# Patient Record
Sex: FEMALE | Race: WHITE | NOT HISPANIC OR LATINO | Employment: FULL TIME | URBAN - METROPOLITAN AREA
[De-identification: names, ages, dates, MRNs, and addresses within clinical notes are randomized per-mention and may not be internally consistent; named-entity substitution may affect disease eponyms.]

---

## 2021-06-30 ENCOUNTER — OFFICE VISIT (OUTPATIENT)
Dept: URGENT CARE | Facility: CLINIC | Age: 51
End: 2021-06-30
Payer: COMMERCIAL

## 2021-06-30 ENCOUNTER — APPOINTMENT (OUTPATIENT)
Dept: RADIOLOGY | Facility: CLINIC | Age: 51
End: 2021-06-30
Payer: COMMERCIAL

## 2021-06-30 VITALS
OXYGEN SATURATION: 97 % | SYSTOLIC BLOOD PRESSURE: 170 MMHG | BODY MASS INDEX: 27.6 KG/M2 | WEIGHT: 150 LBS | HEART RATE: 88 BPM | RESPIRATION RATE: 20 BRPM | DIASTOLIC BLOOD PRESSURE: 100 MMHG | HEIGHT: 62 IN | TEMPERATURE: 98 F

## 2021-06-30 DIAGNOSIS — S80.212A ABRASION, LEFT KNEE, INITIAL ENCOUNTER: ICD-10-CM

## 2021-06-30 DIAGNOSIS — T23.221A PARTIAL THICKNESS BURN OF RIGHT MIDDLE FINGER: ICD-10-CM

## 2021-06-30 DIAGNOSIS — R07.81 RIB PAIN ON LEFT SIDE: Primary | ICD-10-CM

## 2021-06-30 DIAGNOSIS — R07.81 RIB PAIN ON LEFT SIDE: ICD-10-CM

## 2021-06-30 PROCEDURE — 71101 X-RAY EXAM UNILAT RIBS/CHEST: CPT

## 2021-06-30 PROCEDURE — 99203 OFFICE O/P NEW LOW 30 MIN: CPT | Performed by: EMERGENCY MEDICINE

## 2021-06-30 RX ORDER — AMLODIPINE BESYLATE 5 MG/1
5 TABLET ORAL DAILY
COMMUNITY
Start: 2021-05-13

## 2021-06-30 RX ORDER — SULFAMETHOXAZOLE AND TRIMETHOPRIM 800; 160 MG/1; MG/1
1 TABLET ORAL EVERY 12 HOURS SCHEDULED
Qty: 14 TABLET | Refills: 0 | Status: SHIPPED | OUTPATIENT
Start: 2021-06-30 | End: 2021-07-07

## 2021-06-30 RX ORDER — CYCLOBENZAPRINE HCL 10 MG
10 TABLET ORAL 3 TIMES DAILY PRN
Qty: 20 TABLET | Refills: 0 | Status: SHIPPED | OUTPATIENT
Start: 2021-06-30

## 2021-06-30 NOTE — PROGRESS NOTES
[facility]        NAME: Damion Travis is a 46 y o  female  : 1970    MRN: [id number]  DATE: 2021  TIME: 9:48 AM    Assessment and Plan   Rib pain on left side [R07 81]  1  Rib pain on left side     Radiology report questionable fracture nondisplaced on single view only left 6th rib  I discussed this possibility with patient during her visit and called and left a message with this information  Patient Instructions     There are no Patient Instructions on file for this visit  Follow up with PCP in 3-5 days  Proceed to  ER if symptoms worsen  Chief Complaint     Chief Complaint   Patient presents with    Rib Injury     was knocked over by her horse 3 days ago and has severe pain in left lower anterior ribs  Has trouble breathing and is coughing alot         History of Present Illness       Patient complains of pain left ribs and abrasion of left knee since knocked over by her horse 3 days ago  She also complains of burn to her right middle finger in an unrelated event yesterday  She denies shortness of breath  She denies hemoptysis  She believes she is up-to-date on tetanus      Review of Systems   Review of Systems   Constitutional: Negative for activity change  Respiratory: Negative for cough and shortness of breath  Cardiovascular: Positive for chest pain  Negative for palpitations  Gastrointestinal: Negative for abdominal pain  Musculoskeletal: Negative for arthralgias, back pain, joint swelling, myalgias, neck pain and neck stiffness  Skin: Positive for wound  Negative for color change  Neurological: Negative for dizziness, syncope, weakness, light-headedness and headaches           Current Medications       Current Outpatient Medications:     amLODIPine (NORVASC) 5 mg tablet, Take 5 mg by mouth daily, Disp: , Rfl:     Current Allergies     Allergies as of 2021    (No Known Allergies)            The following portions of the patient's history were reviewed and updated as appropriate: allergies, current medications, past family history, past medical history, past social history, past surgical history and problem list      History reviewed  No pertinent past medical history  No past surgical history on file  No family history on file  Medications have been verified  Objective   /100   Pulse 88   Temp 98 °F (36 7 °C)   Resp 20   Ht 5' 2" (1 575 m)   Wt 68 kg (150 lb)   SpO2 97%   BMI 27 44 kg/m²        Physical Exam     Physical Exam  Vitals and nursing note reviewed  Constitutional:       Appearance: She is well-developed  HENT:      Head: Normocephalic and atraumatic  Eyes:      Conjunctiva/sclera: Conjunctivae normal       Pupils: Pupils are equal, round, and reactive to light  Cardiovascular:      Rate and Rhythm: Normal rate and regular rhythm  Heart sounds: Normal heart sounds  No murmur heard  No friction rub  No gallop  Pulmonary:      Effort: Pulmonary effort is normal       Breath sounds: Normal breath sounds  Musculoskeletal:         General: No tenderness  Cervical back: Normal range of motion and neck supple  Skin:     General: Skin is warm and dry  Findings: No rash  Comments: Superficial abrasion left knee, second-degree burn right middle finger palmar aspect   Neurological:      Mental Status: She is alert and oriented to person, place, and time

## 2021-06-30 NOTE — PATIENT INSTRUCTIONS
Rib Contusion   WHAT YOU NEED TO KNOW:   A rib contusion is a bruise on one or more of your ribs  DISCHARGE INSTRUCTIONS:   Return to the emergency department if:   · You have increased chest pain  · You have shortness of breath  · You start to cough up blood  · Your pain does not improve with pain medicine  Contact your healthcare provider if:   · You have a cough  · You have a fever  · You have questions or concerns about your condition or care  Medicines: You may need any of the following:  · NSAIDs , such as ibuprofen, help decrease swelling, pain, and fever  This medicine is available with or without a doctor's order  NSAIDs can cause stomach bleeding or kidney problems in certain people  If you take blood thinner medicine, always ask if NSAIDs are safe for you  Always read the medicine label and follow directions  Do not give these medicines to children under 10months of age without direction from your child's healthcare provider  · Prescription pain medicine  may be given  Ask how to take this medicine safely  · Take your medicine as directed  Contact your healthcare provider if you think your medicine is not helping or if you have side effects  Tell him of her if you are allergic to any medicine  Keep a list of the medicines, vitamins, and herbs you take  Include the amounts, and when and why you take them  Bring the list or the pill bottles to follow-up visits  Carry your medicine list with you in case of an emergency  Deep breathing:   · To help prevent pneumonia, take 10 deep breaths every hour, even when you wake up during the night  Brace your ribs with your hands or a pillow while you take deep breaths or cough  This will help decrease your pain  · You may need to use an incentive spirometer to help you take deeper breaths  Put the plastic piece into your mouth and take a very deep breath  Hold your breath as long as you can  Then let out your breath   Do this 10 times in a row every hour while you are awake  Rest:  Rest your ribs to decrease swelling and allow the injury to heal faster  Avoid activities that may cause more pain or damage to your ribs  As your pain decreases, begin movements slowly  Ice:  Ice helps decrease swelling and pain  Ice may also help prevent tissue damage  Use an ice pack or put crushed ice in a plastic bag  Cover it with a towel and place it on your bruised area for 15 to 20 minutes every hour as directed  Follow up with your healthcare provider as directed:  Write down your questions so you remember to ask them during your visits  © Copyright [address] Information is for End User's use only and may not be sold, redistributed or otherwise used for commercial purposes  All illustrations and images included in CareNotes® are the copyrighted property of [employer]  or Erick Broderick  The above information is an  only  It is not intended as medical advice for individual conditions or treatments  Talk to your doctor, nurse or pharmacist before following any medical regimen to see if it is safe and effective for you  Second-Degree Burn   WHAT YOU NEED TO KNOW:   A second-degree burn is also called a partial-thickness burn  A second-degree burn occurs when the first layer and some of the second layer of skin are burned  A superficial second-degree burn usually heals within 2 to 3 weeks with some scarring  A deep second-degree burn can take longer to heal  A second-degree burn can also get worse after a few days and become a third-degree burn  DISCHARGE INSTRUCTIONS:   Return to the emergency department if:   · You have a fast heartbeat or breathing  · You are not urinating  Call your doctor or burn specialist if:   · You have a fever  · You have increased redness, numbness, or swelling in the burn area  · Your wound or bandage is leaking pus and has a bad smell      · Your pain does not get better, or gets worse, even after you take pain medicine  · You have a dry mouth or eyes  · You are overly thirsty or tired  · You have dark yellow urine or urinate less than usual     · You have a headache or feel dizzy  · You have questions or concerns about your condition or care  Medicines:   · Medicines  may be given to decrease pain, prevent infection, or help your burn heal  They may be given as a pill or as an ointment applied to your skin  · Take your medicine as directed  Contact your healthcare provider if you think your medicine is not helping or if you have side effects  Tell him or her if you are allergic to any medicine  Keep a list of the medicines, vitamins, and herbs you take  Include the amounts, and when and why you take them  Bring the list or the pill bottles to follow-up visits  Carry your medicine list with you in case of an emergency  Burn care:   · Wash your hands with soap and water  Dry your hands with a clean towel or paper towel  · Remove old bandages  You may need to soak the bandage in water before you remove it so it will not stick to your wound  · Gently clean the burned area daily with mild soap and water  Pat the area dry  Look for any swelling or redness around the burn  Do not break closed blisters  You may cause a skin infection  · Apply cream or ointment to the burn with a cotton swab  Place a nonstick bandage over your burn  · Wrap a layer of gauze around the bandage to hold it in place  The wrap should be snug but not tight  It is too tight if you feel tingling or lose feeling in that area  · Apply gentle pressure for a few minutes if bleeding occurs  · Elevate your burned arm or leg above the level of your heart as often as you can  This will help decrease swelling and pain  Prop your burned arm or leg on pillows or blankets to keep it elevated comfortably  Self-care:   · Drink liquids as directed    You may need to drink extra liquid to help prevent dehydration  Ask how much liquid to drink each day and which liquids are best for you  · Go to physical therapy, if directed  Your muscles and joints may not work well after a second-degree burn  A physical therapist teaches you exercises to help improve movement and strength, and to decrease pain  Prevent second-degree burns:   · Do not leave cups, mugs, or bowls containing hot liquids at the edge of a table  Keep pot handles turned away from the stove front  · Do not leave a lit cigarette  Make sure it is no longer lit  Then dispose of it safely  · Store dangerous items out of the reach of children  Store cigarette lighters, matches, and chemicals where children cannot reach them  Use child safety latches on the door of the safe storage area  · Keep your water heater setting to low or medium  (90°F to 120°F, or 32°C to 48°C)  · Wear sunscreen that has a sun protectant factor (SPF) of 15 or higher  The sunscreen should also have ultraviolet A (UVA) and ultraviolet B (UVB) protection  Follow the directions on the label when you use sunscreen  Put on more sunscreen if you are in the sun for more than an hour  Reapply sunscreen often if you go swimming or are sweating  Follow up with your doctor or burn specialist as directed: You may need to return to have your wound checked and your bandage changed  Write down your questions so you remember to ask them during your visits  © Copyright [address] Information is for End User's use only and may not be sold, redistributed or otherwise used for commercial purposes  All illustrations and images included in CareNotes® are the copyrighted property of [employer]  or [address] [place], [place] [address]bill   The above information is an  only  It is not intended as medical advice for individual conditions or treatments   Talk to your doctor, nurse or pharmacist before following any medical regimen to see if it is safe and effective for you

## 2022-01-03 ENCOUNTER — OFFICE VISIT (OUTPATIENT)
Dept: URGENT CARE | Facility: CLINIC | Age: 52
End: 2022-01-03
Payer: COMMERCIAL

## 2022-01-03 VITALS
HEART RATE: 72 BPM | OXYGEN SATURATION: 97 % | HEIGHT: 62 IN | TEMPERATURE: 97.2 F | WEIGHT: 145 LBS | RESPIRATION RATE: 17 BRPM | BODY MASS INDEX: 26.68 KG/M2

## 2022-01-03 DIAGNOSIS — R68.89 FLU-LIKE SYMPTOMS: Primary | ICD-10-CM

## 2022-01-03 PROCEDURE — 99213 OFFICE O/P EST LOW 20 MIN: CPT | Performed by: EMERGENCY MEDICINE

## 2022-01-03 PROCEDURE — 87636 SARSCOV2 & INF A&B AMP PRB: CPT | Performed by: EMERGENCY MEDICINE

## 2022-01-03 RX ORDER — ONDANSETRON HYDROCHLORIDE 8 MG/1
8 TABLET, FILM COATED ORAL EVERY 8 HOURS PRN
Qty: 8 TABLET | Refills: 0 | Status: SHIPPED | OUTPATIENT
Start: 2022-01-03

## 2022-01-03 NOTE — PROGRESS NOTES
[facility]        NAME: Destinee Rodrigues is a 46 y o  female  : 1970    MRN: [id number]  DATE: January 3, 2022  TIME: 12:06 PM    Assessment and Plan   Flu-like symptoms [R68 89]  1  Flu-like symptoms  COVID/FLU- Office Collect         Patient Instructions     Patient Instructions     You have been diagnosed with a flu-like illness, and your symptoms should resolve over the next 7 to 10 days with the treatments recommended today  If they do not, it is possible that you have developed a bacterial infection and you should return  If you were to take an antibiotic while you are still in the viral stage, you will not get better any faster, but could kill off good germs in your body as well as make germs resistant to the antibiotic  Take an expectorant - guaifenesin should be the only ingredient - during the day, and the cough suppressant (ex  Robitussin DM or Tessalon) if needed at night only  Take Zinc 50 mg every 12 hours for the next week  You should also take Quercetin 500 mg twice daily with it  You should also take vitamin D3 5000 i u s per day for the next 1 week, and vitamin-C 1 g daily  May take Flonase as discussed  You may also take a decongestant like Sudafed, unless you have hypertension or cardiac disease  You may take Imodium for diarrhea according to package instructions  Flu-like illness   AMBULATORY CARE:   Flu-like illness is an infection caused by a virus  The flu is easily spread when an infected person coughs, sneezes, or has close contact with others  You may be able to spread the flu to others for 1 week or longer after signs or symptoms appear     Common signs and symptoms include the following:   · Fever and chills    · Headaches, body aches, and muscle or joint pain    · Cough, runny nose, and sore throat    · Loss of appetite, nausea, vomiting, or diarrhea    · Tiredness    · Trouble breathing  Call 911 for any of the following:   · You have trouble breathing, and your lips look purple or blue  · You have a seizure  Seek care immediately if:   · You are dizzy, or you are urinating less or not at all  · You have a headache with a stiff neck, and you feel tired or confused  · You have new pain or pressure in your chest     · Your symptoms, such as shortness of breath, vomiting, or diarrhea, get worse  · Your symptoms, such as fever and coughing, seem to get better, but then get worse  Contact your healthcare provider if:   · You have new muscle pain or weakness  · You have questions or concerns about your condition or care  Treatment for influenza  may include any of the following:  · Acetaminophen decreases pain and fever  It is available without a doctor's order  Ask how much to take and how often to take it  Follow directions  Acetaminophen can cause liver damage if not taken correctly  · NSAIDs  such as ibuprofen, help decrease swelling, pain, and fever  This medicine is available with or without a doctor's order  NSAIDs can cause stomach bleeding or kidney problems in certain people  If you take blood thinner medicine, always ask your healthcare provider if NSAIDs are safe for you  Always read the medicine label and follow directions  · Antivirals  help fight a viral infection  Manage your symptoms:   · Rest  as much as you can to help you recover  · Drink liquids as directed  to help prevent dehydration  Ask how much liquid to drink each day and which liquids are best for you  Prevent the spread of the flu:   · Wash your hands often  Use soap and water  Wash your hands after you use the bathroom, change a child's diapers, or sneeze  Wash your hands before you prepare or eat food  Use gel hand cleanser when soap and water are not available  Do not touch your eyes, nose, or mouth unless you have washed your hands first        · Cover your mouth when you sneeze or cough  Cough into a tissue or the bend of your arm      · Clean shared items with a germ-killing   Clean table surfaces, doorknobs, and light switches  Do not share towels, silverware, and dishes with people who are sick  Wash bed sheets, towels, silverware, and dishes with soap and water  · Wear a mask  over your mouth and nose if you are sick or are near anyone who is sick  · Stay away from others  if you are sick  · Influenza vaccine  helps prevent influenza (flu)  Everyone older than 6 months should get a yearly influenza vaccine  Get the vaccine as soon as it is available, usually in September or October each year  Follow up with your healthcare provider as directed:  Write down your questions so you remember to ask them during your visits  © 2017 [address] Information is for End User's use only and may not be sold, redistributed or otherwise used for commercial purposes  All illustrations and images included in CareNotes® are the copyrighted property of [employer]  or S[employer] Cruz  The above information is an  only  It is not intended as medical advice for individual conditions or treatments  Talk to your doctor, nurse or pharmacist before following any medical regimen to see if it is safe and effective for you  [address]     Your healthcare provider and/or public health staff have evaluated you and have determined that you do not need to be hospitalized at this time  At this time you can be isolated at home where you will be monitored by staff from your local or state health department  You should carefully follow the prevention and isolation steps below until a healthcare provider or local or state health department says that you can return to your normal activities  Stay home except to get medical care     People who are mildly ill with COVID-19 are able to isolate at home during their illness   You should restrict activities outside your home, except for getting medical care  Do not go to work, school, or public areas  Avoid using public transportation, ride-sharing, or taxis  Separate yourself from other people and animals in your home     People: As much as possible, you should stay in a specific room and away from other people in your home  Also, you should use a separate bathroom, if available  Animals: You should restrict contact with pets and other animals while you are sick with COVID-19, just like you would around other people  Although there have not been reports of pets or other animals becoming sick with COVID-19, it is still recommended that people sick with COVID-19 limit contact with animals until more information is known about the virus  When possible, have another member of your household care for your animals while you are sick  If you are sick with COVID-19, avoid contact with your pet, including petting, snuggling, being kissed or licked, and sharing food  If you must care for your pet or be around animals while you are sick, wash your hands before and after you interact with pets and wear a facemask  See COVID-19 and Animals for more information  Call ahead before visiting your doctor     If you have a medical appointment, call the healthcare provider and tell them that you have or may have COVID-19  This will help the healthcare providers office take steps to keep other people from getting infected or exposed  Wear a facemask     You should wear a facemask when you are around other people (e g , sharing a room or vehicle) or pets and before you enter a healthcare providers office  If you are not able to wear a facemask (for example, because it causes trouble breathing), then people who live with you should not stay in the same room with you, or they should wear a facemask if they enter your room  Cover your coughs and sneezes     Cover your mouth and nose with a tissue when you cough or sneeze  Throw used tissues in a lined trash can  Immediately wash your hands with soap and water for at least 20 seconds or, if soap and water are not available, clean your hands with an alcohol-based hand  that contains at least 60% alcohol  Clean your hands often     Wash your hands often with soap and water for at least 20 seconds, especially after blowing your nose, coughing, or sneezing; going to the bathroom; and before eating or preparing food  If soap and water are not readily available, use an alcohol-based hand  with at least 60% alcohol, covering all surfaces of your hands and rubbing them together until they feel dry  Soap and water are the best option if hands are visibly dirty  Avoid touching your eyes, nose, and mouth with unwashed hands  Avoid sharing personal household items     You should not share dishes, drinking glasses, cups, eating utensils, towels, or bedding with other people or pets in your home  After using these items, they should be washed thoroughly with soap and water  Clean all high-touch surfaces everyday     High touch surfaces include counters, tabletops, doorknobs, bathroom fixtures, toilets, phones, keyboards, tablets, and bedside tables  Also, clean any surfaces that may have blood, stool, or body fluids on them  Use a household cleaning spray or wipe, according to the label instructions  Labels contain instructions for safe and effective use of the cleaning product including precautions you should take when applying the product, such as wearing gloves and making sure you have good ventilation during use of the product  Monitor your symptoms     Seek prompt medical attention if your illness is worsening (e g , difficulty breathing)  Before seeking care, call your healthcare provider and tell them that you have, or are being evaluated for, COVID-19  Put on a facemask before you enter the facility   These steps will help the healthcare providers office to keep other people in the office or waiting room from getting infected or exposed  Ask your healthcare provider to call the local or state health department  Persons who are placed under active monitoring or facilitated self-monitoring should follow instructions provided by their local health department or occupational health professionals, as appropriate  If you have a medical emergency and need to call 911, notify the dispatch personnel that you have, or are being evaluated for COVID-19  If possible, put on a facemask before emergency medical services arrive  Discontinuing home isolation     Patients with confirmed COVID-19 should remain under home isolation precautions until the risk of secondary transmission to others is thought to be low  The decision to discontinue home isolation precautions should be made on a case-by-case basis, in consultation with healthcare providers and [facility] and [facility] health departments  Source: [employer]        Proceed to ER if symptoms worsen        Follow up with PCP in 3-5 days  Proceed to  ER if symptoms worsen  Chief Complaint     Chief Complaint   Patient presents with    COVID-19     Symptoms started yesterday, Chills, vomiting, body aches, and headache  Covid 19 Vaccinated          History of Present Illness       Patient complains of chills, vomiting, generalized aches, headaches since yesterday  Review of Systems   Review of Systems   Constitutional: Negative for appetite change, chills, fatigue and fever  HENT: Positive for congestion, rhinorrhea, sinus pressure and sore throat  Negative for trouble swallowing and voice change  Respiratory: Positive for cough  Negative for chest tightness, shortness of breath and wheezing  Cardiovascular: Negative for chest pain  Gastrointestinal: Positive for nausea and vomiting  Musculoskeletal: Negative for myalgias  Neurological: Positive for headaches           Current Medications       Current Outpatient Medications:     amLODIPine (NORVASC) 5 mg tablet, Take 5 mg by mouth daily, Disp: , Rfl:     cyclobenzaprine (FLEXERIL) 10 mg tablet, Take 1 tablet (10 mg total) by mouth 3 (three) times a day as needed for muscle spasms (Patient not taking: Reported on 1/3/2022 ), Disp: 20 tablet, Rfl: 0    Current Allergies     Allergies as of 01/03/2022    (No Known Allergies)            The following portions of the patient's history were reviewed and updated as appropriate: allergies, current medications, past family history, past medical history, past social history, past surgical history and problem list      Past Medical History:   Diagnosis Date    Fibromyalgia     Hypertension     Thoracic outlet syndrome        Past Surgical History:   Procedure Laterality Date    ENDOMETRIAL ABLATION      KNEE SURGERY         Family History   Problem Relation Age of Onset    No Known Problems Mother     Cancer Father          Medications have been verified  Objective   Pulse 72   Temp (!) 97 2 °F (36 2 °C)   Resp 17   Ht 5' 2" (1 575 m)   Wt 65 8 kg (145 lb)   SpO2 97%   BMI 26 52 kg/m²        Physical Exam     Physical Exam  Vitals and nursing note reviewed  Constitutional:       General: She is not in acute distress  Appearance: She is well-developed  HENT:      Head: Normocephalic and atraumatic  Nose: Mucosal edema and congestion present  Mouth/Throat:      Pharynx: Oropharynx is clear  Posterior oropharyngeal erythema present  No oropharyngeal exudate  Tonsils: No tonsillar abscesses  Cardiovascular:      Rate and Rhythm: Normal rate and regular rhythm  Heart sounds: Normal heart sounds  No murmur heard  No friction rub  No gallop  Pulmonary:      Effort: Pulmonary effort is normal  No respiratory distress  Breath sounds: No wheezing or rales  Musculoskeletal:      Cervical back: Neck supple     Skin:     General: Skin is warm and dry    Neurological:      Mental Status: She is alert and oriented to person, place, and time  Psychiatric:         Mood and Affect: Mood normal          Behavior: Behavior normal          Thought Content:  Thought content normal          Judgment: Judgment normal

## 2022-01-03 NOTE — PATIENT INSTRUCTIONS
You have been diagnosed with a flu-like illness, and your symptoms should resolve over the next 7 to 10 days with the treatments recommended today  If they do not, it is possible that you have developed a bacterial infection and you should return  If you were to take an antibiotic while you are still in the viral stage, you will not get better any faster, but could kill off good germs in your body as well as make germs resistant to the antibiotic  Take an expectorant - guaifenesin should be the only ingredient - during the day, and the cough suppressant (ex  Robitussin DM or Tessalon) if needed at night only  Take Zinc 50 mg every 12 hours for the next week  You should also take Quercetin 500 mg twice daily with it  You should also take vitamin D3 5000 i u s per day for the next 1 week, and vitamin-C 1 g daily  May take Flonase as discussed  You may also take a decongestant like Sudafed, unless you have hypertension or cardiac disease  You may take Imodium for diarrhea according to package instructions  Flu-like illness   AMBULATORY CARE:   Flu-like illness is an infection caused by a virus  The flu is easily spread when an infected person coughs, sneezes, or has close contact with others  You may be able to spread the flu to others for 1 week or longer after signs or symptoms appear  Common signs and symptoms include the following:   · Fever and chills    · Headaches, body aches, and muscle or joint pain    · Cough, runny nose, and sore throat    · Loss of appetite, nausea, vomiting, or diarrhea    · Tiredness    · Trouble breathing  Call 911 for any of the following:   · You have trouble breathing, and your lips look purple or blue  · You have a seizure  Seek care immediately if:   · You are dizzy, or you are urinating less or not at all  · You have a headache with a stiff neck, and you feel tired or confused      · You have new pain or pressure in your chest     · Your symptoms, such as shortness of breath, vomiting, or diarrhea, get worse  · Your symptoms, such as fever and coughing, seem to get better, but then get worse  Contact your healthcare provider if:   · You have new muscle pain or weakness  · You have questions or concerns about your condition or care  Treatment for influenza  may include any of the following:  · Acetaminophen decreases pain and fever  It is available without a doctor's order  Ask how much to take and how often to take it  Follow directions  Acetaminophen can cause liver damage if not taken correctly  · NSAIDs  such as ibuprofen, help decrease swelling, pain, and fever  This medicine is available with or without a doctor's order  NSAIDs can cause stomach bleeding or kidney problems in certain people  If you take blood thinner medicine, always ask your healthcare provider if NSAIDs are safe for you  Always read the medicine label and follow directions  · Antivirals  help fight a viral infection  Manage your symptoms:   · Rest  as much as you can to help you recover  · Drink liquids as directed  to help prevent dehydration  Ask how much liquid to drink each day and which liquids are best for you  Prevent the spread of the flu:   · Wash your hands often  Use soap and water  Wash your hands after you use the bathroom, change a child's diapers, or sneeze  Wash your hands before you prepare or eat food  Use gel hand cleanser when soap and water are not available  Do not touch your eyes, nose, or mouth unless you have washed your hands first        · Cover your mouth when you sneeze or cough  Cough into a tissue or the bend of your arm  · Clean shared items with a germ-killing   Clean table surfaces, doorknobs, and light switches  Do not share towels, silverware, and dishes with people who are sick  Wash bed sheets, towels, silverware, and dishes with soap and water       · Wear a mask  over your mouth and nose if you are sick or are near anyone who is sick  · Stay away from others  if you are sick  · Influenza vaccine  helps prevent influenza (flu)  Everyone older than 6 months should get a yearly influenza vaccine  Get the vaccine as soon as it is available, usually in September or October each year  Follow up with your healthcare provider as directed:  Write down your questions so you remember to ask them during your visits  © 2017 [address] Information is for End User's use only and may not be sold, redistributed or otherwise used for commercial purposes  All illustrations and images included in CareNotes® are the copyrighted property of [employer]  or [employer] Cruz  The above information is an  only  It is not intended as medical advice for individual conditions or treatments  Talk to your doctor, nurse or pharmacist before following any medical regimen to see if it is safe and effective for you  [address]     Your healthcare provider and/or public health staff have evaluated you and have determined that you do not need to be hospitalized at this time  At this time you can be isolated at home where you will be monitored by staff from your local or state health department  You should carefully follow the prevention and isolation steps below until a healthcare provider or local or state health department says that you can return to your normal activities  Stay home except to get medical care     People who are mildly ill with COVID-19 are able to isolate at home during their illness  You should restrict activities outside your home, except for getting medical care  Do not go to work, school, or public areas  Avoid using public transportation, ride-sharing, or taxis  Separate yourself from other people and animals in your home     People: As much as possible, you should stay in a specific room and away from other people in your home   Also, you should use a separate bathroom, if available  Animals: You should restrict contact with pets and other animals while you are sick with COVID-19, just like you would around other people  Although there have not been reports of pets or other animals becoming sick with COVID-19, it is still recommended that people sick with COVID-19 limit contact with animals until more information is known about the virus  When possible, have another member of your household care for your animals while you are sick  If you are sick with COVID-19, avoid contact with your pet, including petting, snuggling, being kissed or licked, and sharing food  If you must care for your pet or be around animals while you are sick, wash your hands before and after you interact with pets and wear a facemask  See COVID-19 and Animals for more information  Call ahead before visiting your doctor     If you have a medical appointment, call the healthcare provider and tell them that you have or may have COVID-19  This will help the healthcare providers office take steps to keep other people from getting infected or exposed  Wear a facemask     You should wear a facemask when you are around other people (e g , sharing a room or vehicle) or pets and before you enter a healthcare providers office  If you are not able to wear a facemask (for example, because it causes trouble breathing), then people who live with you should not stay in the same room with you, or they should wear a facemask if they enter your room  Cover your coughs and sneezes     Cover your mouth and nose with a tissue when you cough or sneeze  Throw used tissues in a lined trash can  Immediately wash your hands with soap and water for at least 20 seconds or, if soap and water are not available, clean your hands with an alcohol-based hand  that contains at least 60% alcohol       Clean your hands often     Wash your hands often with soap and water for at least 20 seconds, especially after blowing your nose, coughing, or sneezing; going to the bathroom; and before eating or preparing food  If soap and water are not readily available, use an alcohol-based hand  with at least 60% alcohol, covering all surfaces of your hands and rubbing them together until they feel dry  Soap and water are the best option if hands are visibly dirty  Avoid touching your eyes, nose, and mouth with unwashed hands  Avoid sharing personal household items     You should not share dishes, drinking glasses, cups, eating utensils, towels, or bedding with other people or pets in your home  After using these items, they should be washed thoroughly with soap and water  Clean all high-touch surfaces everyday     High touch surfaces include counters, tabletops, doorknobs, bathroom fixtures, toilets, phones, keyboards, tablets, and bedside tables  Also, clean any surfaces that may have blood, stool, or body fluids on them  Use a household cleaning spray or wipe, according to the label instructions  Labels contain instructions for safe and effective use of the cleaning product including precautions you should take when applying the product, such as wearing gloves and making sure you have good ventilation during use of the product  Monitor your symptoms     Seek prompt medical attention if your illness is worsening (e g , difficulty breathing)  Before seeking care, call your healthcare provider and tell them that you have, or are being evaluated for, COVID-19  Put on a facemask before you enter the facility  These steps will help the healthcare providers office to keep other people in the office or waiting room from getting infected or exposed  Ask your healthcare provider to call the local or Counts include 234 beds at the [facility] health department   Persons who are placed under active monitoring or facilitated self-monitoring should follow instructions provided by their local health department or occupational health professionals, as appropriate  If you have a medical emergency and need to call 911, notify the dispatch personnel that you have, or are being evaluated for COVID-19  If possible, put on a facemask before emergency medical services arrive  Discontinuing home isolation     Patients with confirmed COVID-19 should remain under home isolation precautions until the risk of secondary transmission to others is thought to be low  The decision to discontinue home isolation precautions should be made on a case-by-case basis, in consultation with healthcare providers and state and local health departments       Source: [employer]        Proceed to ER if symptoms worsen

## 2022-01-07 LAB
FLUAV RNA RESP QL NAA+PROBE: NEGATIVE
FLUBV RNA RESP QL NAA+PROBE: NEGATIVE
SARS-COV-2 RNA RESP QL NAA+PROBE: NEGATIVE

## 2022-02-14 ENCOUNTER — APPOINTMENT (OUTPATIENT)
Dept: RADIOLOGY | Facility: CLINIC | Age: 52
End: 2022-02-14
Payer: COMMERCIAL

## 2022-02-14 ENCOUNTER — OFFICE VISIT (OUTPATIENT)
Dept: URGENT CARE | Facility: CLINIC | Age: 52
End: 2022-02-14
Payer: COMMERCIAL

## 2022-02-14 VITALS
SYSTOLIC BLOOD PRESSURE: 180 MMHG | HEIGHT: 62 IN | DIASTOLIC BLOOD PRESSURE: 92 MMHG | WEIGHT: 145 LBS | BODY MASS INDEX: 26.68 KG/M2 | HEART RATE: 78 BPM | TEMPERATURE: 98 F

## 2022-02-14 DIAGNOSIS — S52.614A CLOSED NONDISPLACED FRACTURE OF STYLOID PROCESS OF RIGHT ULNA, INITIAL ENCOUNTER: ICD-10-CM

## 2022-02-14 DIAGNOSIS — S69.91XA INJURY OF RIGHT WRIST, INITIAL ENCOUNTER: ICD-10-CM

## 2022-02-14 DIAGNOSIS — S69.91XA INJURY OF RIGHT WRIST, INITIAL ENCOUNTER: Primary | ICD-10-CM

## 2022-02-14 PROCEDURE — 99213 OFFICE O/P EST LOW 20 MIN: CPT | Performed by: EMERGENCY MEDICINE

## 2022-02-14 PROCEDURE — 73110 X-RAY EXAM OF WRIST: CPT

## 2022-02-14 NOTE — PROGRESS NOTES
[id number]        NAME: Brodie Roy is a 46 y o  female  : 1970    MRN: [id number]  DATE: 2022  TIME: 11:55 AM    Assessment and Plan   Injury of right wrist, initial encounter [S69 [id number]]  1  Injury of right wrist, initial encounter  XR wrist 3+ vw right    Ambulatory Referral to Physical Therapy    Cock Up Wrist Splint   2  Closed nondisplaced fracture of styloid process of right ulna, initial encounter  Ambulatory Referral to Orthopedic Surgery   Static Cock-up wrist splint applied by   Patient Instructions     Patient Instructions     Wrist Injury   WHAT YOU NEED TO KNOW:   A wrist injury is damage to the tissues of your wrist joint  Examples are a fracture, sprain (stretched or torn ligament), or strain (stretched or torn tendon)  DISCHARGE INSTRUCTIONS:   Return to the emergency department if:   · The skin on or near your wrist or hand feels cold or turns blue or white  · The skin on or near your wrist or hand is tight, raised, and swollen  · You have new trouble moving and using your hands, fingers, or wrist     · Your wrist, hands, or fingers become swollen, red, numb, or tingle  · You have any open wounds that are red, swollen, warm, or have pus coming from them  Call your doctor if:   · You have a fever  · The bruising, swelling, or pain in your wrist gets worse  · You have questions or concerns about your condition or care  Medicines: You may need any of the following:  · NSAIDs , such as ibuprofen, help decrease swelling, pain, and fever  NSAIDs can cause stomach bleeding or kidney problems in certain people  If you take blood thinner medicine, always ask your healthcare provider if NSAIDs are safe for you  Always read the medicine label and follow directions  · Prescription pain medicine  may be given  Ask your healthcare provider how to take this medicine safely  Some prescription pain medicines contain acetaminophen   Do not take other medicines that contain acetaminophen without talking to your healthcare provider  Too much acetaminophen may cause liver damage  Prescription pain medicine may cause constipation  Ask your healthcare provider how to prevent or treat constipation  · Take your medicine as directed  Contact your healthcare provider if you think your medicine is not helping or if you have side effects  Tell him or her if you are allergic to any medicine  Keep a list of the medicines, vitamins, and herbs you take  Include the amounts, and when and why you take them  Bring the list or the pill bottles to follow-up visits  Carry your medicine list with you in case of an emergency  Manage your symptoms:   · Rest  your wrist for 48 hours, or as directed  Avoid activities that cause pain  Ask which activities you should avoid and for how long  Ask when you may return to your regular physical activities or sports  If you start to use your wrist too soon, you may injure it wrist again  · Put ice  on your wrist to decrease pain and swelling  Use an ice pack, or put crushed ice in a plastic bag  Wrap a towel around the bag before you put it on your wrist  Apply ice for 15 minutes every hour, or as directed  · Apply compression  by wrapping your wrist with an elastic bandage  This will help decrease swelling, support your wrist, and help it heal  Wear your wrist wrap as directed  The bandage should be snug but not so tight that your fingers are numb or tingly  · Elevate  your wrist above the level of your heart when you sit or lie down  Prop your arm and hand on pillows to keep your wrist elevated comfortably  · Go to physical therapy,  if directed  A physical therapist can teach you exercises to strengthen your wrist and improve the range of movement  These exercises may also help decrease your pain  Prevent another wrist injury:   · Do strengthening exercises    Your healthcare provider or physical therapist may suggest that you do exercises to strengthen your hand and arm muscles  He or she will tell you when to start doing these exercises and how long to continue  · Protect your wrists  Wrist guard splints or protective tape can help support your wrist during exercise and sports  These devices may also keep your wrist from bending too far back  Ask for more information about the type of wrist support that you should use  Follow up with your doctor as directed:  Write down your questions so you remember to ask them during your visits  © Copyright [address] 2021 Information is for End User's use only and may not be sold, redistributed or otherwise used for commercial purposes  All illustrations and images included in CareNotes® are the copyrighted property of [employer] M [employer]  or Erick Duncan   The above information is an  only  It is not intended as medical advice for individual conditions or treatments  Talk to your doctor, nurse or pharmacist before following any medical regimen to see if it is safe and effective for you  Wrist Fracture in Adults   WHAT YOU NEED TO KNOW:   A wrist fracture is a break in one or more of the bones in your wrist         DISCHARGE INSTRUCTIONS:   Return to the emergency department if:   · Your pain gets worse or does not get better after you take pain medicine  · Your cast or splint breaks, gets wet, or is damaged  · Your hand or fingers feel numb or cold  · Your hand or fingers turn white or blue  · Your splint or cast feels too tight  · You have more pain or swelling after the cast or splint is put on  Call your doctor if:   · You have a fever  · There is a foul smell or blood coming from under the cast     · You have questions or concerns about your condition or care  Medicines: You may need any of the following:  · Prescription pain medicine  may be given  Ask your healthcare provider how to take this medicine safely   Some prescription pain medicines contain acetaminophen  Do not take other medicines that contain acetaminophen without talking to your healthcare provider  Too much acetaminophen may cause liver damage  Prescription pain medicine may cause constipation  Ask your healthcare provider how to prevent or treat constipation  · NSAIDs , such as ibuprofen, help decrease swelling, pain, and fever  NSAIDs can cause stomach bleeding or kidney problems in certain people  If you take blood thinner medicine, always ask your healthcare provider if NSAIDs are safe for you  Always read the medicine label and follow directions  · Acetaminophen  decreases pain and fever  It is available without a doctor's order  Ask how much to take and how often to take it  Follow directions  Read the labels of all other medicines you are using to see if they also contain acetaminophen, or ask your doctor or pharmacist  Acetaminophen can cause liver damage if not taken correctly  Do not use more than 4 grams (4,000 milligrams) total of acetaminophen in one day  · Take your medicine as directed  Contact your healthcare provider if you think your medicine is not helping or if you have side effects  Tell him or her if you are allergic to any medicine  Keep a list of the medicines, vitamins, and herbs you take  Include the amounts, and when and why you take them  Bring the list or the pill bottles to follow-up visits  Carry your medicine list with you in case of an emergency  Self-care:   · Rest  as much as possible  Do not play contact sports until the healthcare provider says it is okay  · Apply ice  on your wrist for 15 to 20 minutes every hour or as directed  Use an ice pack, or put crushed ice in a plastic bag  Cover it with a towel before you place it on your skin  Ice helps prevent tissue damage and decreases swelling and pain  · Elevate  your wrist above the level of your heart as often as possible   This will help decrease swelling and pain  Prop your wrist on pillows or blankets to keep it elevated comfortably  Cast or splint care:   · You may take a bath or shower as directed  Do not let your cast or splint get wet  Before bathing, cover the cast or splint with 2 plastic trash bags  Tape the bags to your skin above the cast or splint to seal out the water  Keep your arm out of the water in case the bag breaks  If a plaster cast gets wet and soft, call your healthcare provider  · Check the skin around the cast or splint every day  You may put lotion on any red or sore areas  · Do not push down or lean on the cast or brace because it may break  · Do not  scratch the skin under the cast by putting a sharp or pointed object inside the cast     Go to physical therapy as directed: You may need physical therapy after your wrist heals and the cast is removed  A physical therapist can teach you exercises to help improve movement and strength and to decrease pain  Follow up with your doctor or bone specialist as directed: You may need to return to have your cast removed  You may also need an x-ray to check how well the bone has healed  Write down your questions so you remember to ask them during your visits  © Copyright [employer] 2021 Information is for End User's use only and may not be sold, redistributed or otherwise used for commercial purposes  All illustrations and images included in CareNotes® are the copyrighted property of A D A M [employer] Inc  or Erick Duncan   The above information is an  only  It is not intended as medical advice for individual conditions or treatments  Talk to your doctor, nurse or pharmacist before following any medical regimen to see if it is safe and effective for you  Follow up with PCP in 3-5 days  Proceed to  ER if symptoms worsen      Chief Complaint     Chief Complaint   Patient presents with    Wrist Injury     has pain in right wrist since the beginning of january when she was dumping a water bucket         History of Present Illness       Patient complains of pain right wrist ulnar aspect since twisting injury to same 6 weeks ago when lifting heavy bucket of water over a fence  She denies impact from the fence to the wrist       Review of Systems   Review of Systems   Constitutional: Negative for activity change  Gastrointestinal: Negative for abdominal pain  Musculoskeletal: Positive for arthralgias and joint swelling  Negative for back pain, myalgias, neck pain and neck stiffness  Skin: Negative for color change and wound  Neurological: Negative for dizziness, syncope, weakness, light-headedness and headaches  Current Medications       Current Outpatient Medications:     amLODIPine (NORVASC) 5 mg tablet, Take 5 mg by mouth daily, Disp: , Rfl:     cyclobenzaprine (FLEXERIL) 10 mg tablet, Take 1 tablet (10 mg total) by mouth 3 (three) times a day as needed for muscle spasms (Patient not taking: Reported on 1/3/2022 ), Disp: 20 tablet, Rfl: 0    ondansetron (ZOFRAN) 8 mg tablet, Take 1 tablet (8 mg total) by mouth every 8 (eight) hours as needed for nausea or vomiting, Disp: 8 tablet, Rfl: 0    Current Allergies     Allergies as of 02/14/2022    (No Known Allergies)            The following portions of the patient's history were reviewed and updated as appropriate: allergies, current medications, past family history, past medical history, past social history, past surgical history and problem list      Past Medical History:   Diagnosis Date    Fibromyalgia     Hypertension     Thoracic outlet syndrome        Past Surgical History:   Procedure Laterality Date    ENDOMETRIAL ABLATION      KNEE SURGERY      THORACIC SYMPATHETECTOMY         Family History   Problem Relation Age of Onset    No Known Problems Mother     Cancer Father          Medications have been verified          Objective   BP (!) 180/92   Pulse 78   Temp 98 °F (36 7 °C) Ht 5' 2" (1 575 m)   Wt 65 8 kg (145 lb)   BMI 26 52 kg/m²        Physical Exam     Physical Exam  Vitals and nursing note reviewed  Constitutional:       Appearance: She is well-developed  HENT:      Head: Normocephalic and atraumatic  Eyes:      Conjunctiva/sclera: Conjunctivae normal       Pupils: Pupils are equal, round, and reactive to light  Musculoskeletal:         General: Tenderness present  Cervical back: Normal range of motion and neck supple  Comments: Tender slightly swollen ulnar aspect of right wrist   Skin:     General: Skin is warm and dry  Findings: No rash  Neurological:      Mental Status: She is alert and oriented to person, place, and time  Deep Tendon Reflexes: Reflexes normal    Psychiatric:         Behavior: Behavior normal          Thought Content:  Thought content normal          Judgment: Judgment normal

## 2022-02-14 NOTE — PATIENT INSTRUCTIONS
Wrist Injury   WHAT YOU NEED TO KNOW:   A wrist injury is damage to the tissues of your wrist joint  Examples are a fracture, sprain (stretched or torn ligament), or strain (stretched or torn tendon)  DISCHARGE INSTRUCTIONS:   Return to the emergency department if:   · The skin on or near your wrist or hand feels cold or turns blue or white  · The skin on or near your wrist or hand is tight, raised, and swollen  · You have new trouble moving and using your hands, fingers, or wrist     · Your wrist, hands, or fingers become swollen, red, numb, or tingle  · You have any open wounds that are red, swollen, warm, or have pus coming from them  Call your doctor if:   · You have a fever  · The bruising, swelling, or pain in your wrist gets worse  · You have questions or concerns about your condition or care  Medicines: You may need any of the following:  · NSAIDs , such as ibuprofen, help decrease swelling, pain, and fever  NSAIDs can cause stomach bleeding or kidney problems in certain people  If you take blood thinner medicine, always ask your healthcare provider if NSAIDs are safe for you  Always read the medicine label and follow directions  · Prescription pain medicine  may be given  Ask your healthcare provider how to take this medicine safely  Some prescription pain medicines contain acetaminophen  Do not take other medicines that contain acetaminophen without talking to your healthcare provider  Too much acetaminophen may cause liver damage  Prescription pain medicine may cause constipation  Ask your healthcare provider how to prevent or treat constipation  · Take your medicine as directed  Contact your healthcare provider if you think your medicine is not helping or if you have side effects  Tell him or her if you are allergic to any medicine  Keep a list of the medicines, vitamins, and herbs you take  Include the amounts, and when and why you take them   Bring the list or the pill bottles to follow-up visits  Carry your medicine list with you in case of an emergency  Manage your symptoms:   · Rest  your wrist for 48 hours, or as directed  Avoid activities that cause pain  Ask which activities you should avoid and for how long  Ask when you may return to your regular physical activities or sports  If you start to use your wrist too soon, you may injure it wrist again  · Put ice  on your wrist to decrease pain and swelling  Use an ice pack, or put crushed ice in a plastic bag  Wrap a towel around the bag before you put it on your wrist  Apply ice for 15 minutes every hour, or as directed  · Apply compression  by wrapping your wrist with an elastic bandage  This will help decrease swelling, support your wrist, and help it heal  Wear your wrist wrap as directed  The bandage should be snug but not so tight that your fingers are numb or tingly  · Elevate  your wrist above the level of your heart when you sit or lie down  Prop your arm and hand on pillows to keep your wrist elevated comfortably  · Go to physical therapy,  if directed  A physical therapist can teach you exercises to strengthen your wrist and improve the range of movement  These exercises may also help decrease your pain  Prevent another wrist injury:   · Do strengthening exercises  Your healthcare provider or physical therapist may suggest that you do exercises to strengthen your hand and arm muscles  He or she will tell you when to start doing these exercises and how long to continue  · Protect your wrists  Wrist guard splints or protective tape can help support your wrist during exercise and sports  These devices may also keep your wrist from bending too far back  Ask for more information about the type of wrist support that you should use  Follow up with your doctor as directed:  Write down your questions so you remember to ask them during your visits    © Copyright [address] 2021 Information is for End User's use only and may not be sold, redistributed or otherwise used for commercial purposes  All illustrations and images included in CareNotes® are the copyrighted property of [employer]  or Erick Broderick  The above information is an  only  It is not intended as medical advice for individual conditions or treatments  Talk to your doctor, nurse or pharmacist before following any medical regimen to see if it is safe and effective for you  Wrist Fracture in Adults   WHAT YOU NEED TO KNOW:   A wrist fracture is a break in one or more of the bones in your wrist         DISCHARGE INSTRUCTIONS:   Return to the emergency department if:   · Your pain gets worse or does not get better after you take pain medicine  · Your cast or splint breaks, gets wet, or is damaged  · Your hand or fingers feel numb or cold  · Your hand or fingers turn white or blue  · Your splint or cast feels too tight  · You have more pain or swelling after the cast or splint is put on  Call your doctor if:   · You have a fever  · There is a foul smell or blood coming from under the cast     · You have questions or concerns about your condition or care  Medicines: You may need any of the following:  · Prescription pain medicine  may be given  Ask your healthcare provider how to take this medicine safely  Some prescription pain medicines contain acetaminophen  Do not take other medicines that contain acetaminophen without talking to your healthcare provider  Too much acetaminophen may cause liver damage  Prescription pain medicine may cause constipation  Ask your healthcare provider how to prevent or treat constipation  · NSAIDs , such as ibuprofen, help decrease swelling, pain, and fever  NSAIDs can cause stomach bleeding or kidney problems in certain people  If you take blood thinner medicine, always ask your healthcare provider if NSAIDs are safe for you   Always read the medicine label and follow directions  · Acetaminophen  decreases pain and fever  It is available without a doctor's order  Ask how much to take and how often to take it  Follow directions  Read the labels of all other medicines you are using to see if they also contain acetaminophen, or ask your doctor or pharmacist  Acetaminophen can cause liver damage if not taken correctly  Do not use more than 4 grams (4,000 milligrams) total of acetaminophen in one day  · Take your medicine as directed  Contact your healthcare provider if you think your medicine is not helping or if you have side effects  Tell him or her if you are allergic to any medicine  Keep a list of the medicines, vitamins, and herbs you take  Include the amounts, and when and why you take them  Bring the list or the pill bottles to follow-up visits  Carry your medicine list with you in case of an emergency  Self-care:   · Rest  as much as possible  Do not play contact sports until the healthcare provider says it is okay  · Apply ice  on your wrist for 15 to 20 minutes every hour or as directed  Use an ice pack, or put crushed ice in a plastic bag  Cover it with a towel before you place it on your skin  Ice helps prevent tissue damage and decreases swelling and pain  · Elevate  your wrist above the level of your heart as often as possible  This will help decrease swelling and pain  Prop your wrist on pillows or blankets to keep it elevated comfortably  Cast or splint care:   · You may take a bath or shower as directed  Do not let your cast or splint get wet  Before bathing, cover the cast or splint with 2 plastic trash bags  Tape the bags to your skin above the cast or splint to seal out the water  Keep your arm out of the water in case the bag breaks  If a plaster cast gets wet and soft, call your healthcare provider  · Check the skin around the cast or splint every day  You may put lotion on any red or sore areas      · Do not push down or lean on the cast or brace because it may break  · Do not  scratch the skin under the cast by putting a sharp or pointed object inside the cast     Go to physical therapy as directed: You may need physical therapy after your wrist heals and the cast is removed  A physical therapist can teach you exercises to help improve movement and strength and to decrease pain  Follow up with your doctor or bone specialist as directed: You may need to return to have your cast removed  You may also need an x-ray to check how well the bone has healed  Write down your questions so you remember to ask them during your visits  © Copyright [employer] 2021 Information is for End User's use only and may not be sold, redistributed or otherwise used for commercial purposes  All illustrations and images included in CareNotes® are the copyrighted property of [employer]  or Erick Broderick  The above information is an  only  It is not intended as medical advice for individual conditions or treatments  Talk to your doctor, nurse or pharmacist before following any medical regimen to see if it is safe and effective for you

## 2022-02-17 ENCOUNTER — OFFICE VISIT (OUTPATIENT)
Dept: OBGYN CLINIC | Facility: CLINIC | Age: 52
End: 2022-02-17
Payer: COMMERCIAL

## 2022-02-17 VITALS
DIASTOLIC BLOOD PRESSURE: 83 MMHG | WEIGHT: 155 LBS | SYSTOLIC BLOOD PRESSURE: 135 MMHG | TEMPERATURE: 97.6 F | HEART RATE: 77 BPM | BODY MASS INDEX: 28.52 KG/M2 | OXYGEN SATURATION: 99 % | HEIGHT: 62 IN

## 2022-02-17 DIAGNOSIS — M25.531 ACUTE PAIN OF RIGHT WRIST: ICD-10-CM

## 2022-02-17 DIAGNOSIS — S69.91XD INJURY OF RIGHT WRIST, SUBSEQUENT ENCOUNTER: ICD-10-CM

## 2022-02-17 DIAGNOSIS — S52.614D CLOSED NONDISPLACED FRACTURE OF STYLOID PROCESS OF RIGHT ULNA WITH ROUTINE HEALING, SUBSEQUENT ENCOUNTER: Primary | ICD-10-CM

## 2022-02-17 PROCEDURE — 99204 OFFICE O/P NEW MOD 45 MIN: CPT | Performed by: STUDENT IN AN ORGANIZED HEALTH CARE EDUCATION/TRAINING PROGRAM

## 2022-02-17 RX ORDER — IBUPROFEN 200 MG
TABLET ORAL EVERY 6 HOURS PRN
COMMUNITY

## 2022-02-17 RX ORDER — NAPROXEN 500 MG/1
500 TABLET ORAL 2 TIMES DAILY WITH MEALS
Qty: 60 TABLET | Refills: 0 | Status: SHIPPED | OUTPATIENT
Start: 2022-02-17 | End: 2022-03-14

## 2022-02-17 NOTE — PROGRESS NOTES
1  Closed nondisplaced fracture of styloid process of right ulna with routine healing, subsequent encounter  Ambulatory Referral to Orthopedic Surgery    naproxen (NAPROSYN) 500 mg tablet    DXA bone density spine hip and pelvis   2  Injury of right wrist, subsequent encounter     3  Acute pain of right wrist       Orders Placed This Encounter   Procedures    DXA bone density spine hip and pelvis        Imaging Studies (I personally reviewed images in PACS and report):     X-ray right wrist 2/14/2022: Non-displaced distal ulnar styloid fracture with ongoing healing as evidenced overlying callus formation  IMPRESSION:   Acute right wrist pain with evidence of non-displaced (but healing) distal ulnar fracture  - reports striking the ulnar aspect of her wrist approximately 6 weeks ago against a gate and had persistent pain/swelling/limited ROM of wrist     Currently in cock-up wrist splint    Other factors:  Reported h/o fibromyalgia, TOS, lupus, osteopenia/porosis    PLAN:     Clinical exam and radiographic imaging reviewed with patient today, with impression as per above  I have discussed with the patient the pathophysiology of this diagnosis and reviewed how the examination correlates with this diagnosis   Reviewed prior imaging with patient as per above   Given her clinical exam, I recommended conservative treatment with either attending formal PT or doing home exercises to facilitate recovery of her wrist ROM, strength - patient prefers to do home exercises which were provided today  Recommended lifting as tolerated with RUE   In regards to pain control, I prescribed naproxen 500 mg p o  B i d  and counseled continue his use for the next 5 days to only use as needed afterwards  I counseled to not concurrently take other NSAIDs with naproxen but she could take acetaminophen 500-1000 mg p o  Q 6 -8 hours p r n  Nissa Banerjee Also recommended p r n  Use ice/heat therapy 20 minutes on/off     Separately, I also ordered a DEXA scan given unusual mechanism of fracture for further evaluation  Counseled continued vitamin D/calcium supplementation  Return in about 3 weeks (around 3/10/2022)  Portions of the record may have been created with voice recognition software  Occasional wrong word or "sound a like" substitutions may have occurred due to the inherent limitations of voice recognition software  Read the chart carefully and recognize, using context, where substitutions have occurred  CHIEF COMPLAINT:  Right wrist injury      HPI:  Marquis Denson is a 46 y o  female  who presents for       Visit 2/17/2022 :  Initial evaluation of right wrist injury: Reportedly ongoing for the past 6 weeks after the ulnar aspect of her wrist struck a gate - she noticed difficulty lifting heavy buckets of water afterwards  Due to persistence of pain/swelling of wrist, she went to urgent care on 2/14/2022 and had imaging done as noted above  She was then placed in a cock-up wrist splint  She reports today that she still has intermittent pain over the ulnar aspect of wrist with limited range of motion  She has been adherent to use of cock-up wrist splint, which she feels does not feel worsen or improves her pain symptoms during the day  She states she takes ibuprofen at night which does alleviate her pain and allows her to sleep  She still reports swelling over the ulnar aspect of her wrist but denies discoloration or numbness/tingling extremity  She denies doing formal PT or doing home exercises yet  Denies prior injuries/surgeries of her right wrist in the past     Of note, she states that she believes she may have history of osteoporosis and had a DEXA scan approximately 7 years ago  She states she takes vitamin-D and calcium daily      Denies F/C    Medical, Surgical, Family, and Social History    Past Medical History:   Diagnosis Date    Fibromyalgia     Hypertension     Lupus ([facility] [address] ) 2017    Thoracic outlet syndrome Past Surgical History:   Procedure Laterality Date    ENDOMETRIAL ABLATION      KNEE SURGERY      THORACIC SYMPATHETECTOMY       Social History   Social History     Substance and Sexual Activity   Alcohol Use Yes    Comment: occassionly      Social History     Substance and Sexual Activity   Drug Use Never     Social History     Tobacco Use   Smoking Status Current Every Day Smoker    Packs/day: 0 10    Types: Cigarettes   Smokeless Tobacco Never Used     Family History   Problem Relation Age of Onset    No Known Problems Mother     Cancer Father      No Known Allergies       Physical Exam  /83   Pulse 77   Temp 97 6 °F (36 4 °C)   Ht 5' 2 25" (1 581 m)   Wt 70 3 kg (155 lb)   SpO2 99%   BMI 28 12 kg/m²     Constitutional:  see vital signs  Gen: well-developed, normocephalic/atraumatic, well-groomed  Eyes: No inflammation or discharge of conjunctiva or lids; sclera clear   Pharynx: no inflammation, lesion, or mass of lips  Pulmonary/Chest: Effort normal  No respiratory distress  NEURO: cranial nerves grossly intact  PSYCH:  Alert and oriented to person, place, and time; recent and remote memory intact; mood normal, no depression, anxiety, or agitation, judgment and insight good and intact     Right Hand Exam     Tenderness   The patient is experiencing tenderness in the ulnar area      Range of Motion   Wrist   Extension:  15 abnormal   Flexion:  70 normal   Pronation:  90 normal   Supination:  50 abnormal     Muscle Strength   Wrist extension: 4/5   Wrist flexion: 4/5   : 5/5     Other   Erythema: absent  Right wrist pulse absent: 2+ radial     Comments:  Full digit MCP/PIP/DIP motion without malrotation or digital scissoring  Thumb MCP/DIP intact with opposition  Localized swelling along ulnar aspect of wrist; otherwise, no bruising, erythema  Sensation intact in radial/median/ulnar distribution                Procedures

## 2022-03-14 ENCOUNTER — OFFICE VISIT (OUTPATIENT)
Dept: OBGYN CLINIC | Facility: CLINIC | Age: 52
End: 2022-03-14
Payer: COMMERCIAL

## 2022-03-14 VITALS
HEART RATE: 70 BPM | SYSTOLIC BLOOD PRESSURE: 148 MMHG | DIASTOLIC BLOOD PRESSURE: 80 MMHG | WEIGHT: 159 LBS | BODY MASS INDEX: 29.26 KG/M2 | HEIGHT: 62 IN | TEMPERATURE: 97.2 F

## 2022-03-14 DIAGNOSIS — S52.614D CLOSED NONDISPLACED FRACTURE OF STYLOID PROCESS OF RIGHT ULNA WITH ROUTINE HEALING, SUBSEQUENT ENCOUNTER: Primary | ICD-10-CM

## 2022-03-14 PROCEDURE — 99212 OFFICE O/P EST SF 10 MIN: CPT | Performed by: STUDENT IN AN ORGANIZED HEALTH CARE EDUCATION/TRAINING PROGRAM

## 2022-03-14 NOTE — PROGRESS NOTES
1  Closed nondisplaced fracture of styloid process of right ulna with routine healing, subsequent encounter  Diclofenac Sodium (VOLTAREN) 1 %     No orders of the defined types were placed in this encounter  Imaging Studies (I personally reviewed images in PACS and report):     X-ray right wrist 2/14/2022: Non-displaced distal ulnar styloid fracture with ongoing healing as evidenced overlying callus formation  IMPRESSION:   Acute right wrist pain secondary to non-displaced distal ulnar fracture  -  radiographically healing on prior imaging - progressively improving since last visit with home exercises since last visit  Patient has full range of motion, strength and only some limited over the ulnar aspect of her wrist    Other factors:  Reported h/o fibromyalgia, TOS, lupus, osteopenia/porosis    PLAN:     Clinical exam and radiographic imaging reviewed with patient today, with impression as per above  I have discussed with the patient the pathophysiology of this diagnosis and reviewed how the examination correlates with this diagnosis   I reassured patient that her clinical exam has significantly improved since last visit and that she should continue the home exercises provided from last visit  In regards to pain control, I have prescribed her topical Voltaren gel that she can apply q i d  P r n  Pain to replace her naproxen   I counseled patient that progression may still take several weeks/months given her history of fibromyalgia as well  Return if symptoms worsen or fail to improve  Portions of the record may have been created with voice recognition software  Occasional wrong word or "sound a like" substitutions may have occurred due to the inherent limitations of voice recognition software  Read the chart carefully and recognize, using context, where substitutions have occurred       CHIEF COMPLAINT:  Right wrist injury      HPI:  Ramon Bauer is a 46 y o  female  who presents for Visit 3/14/1022: Follow up evaluation of right wrist injury/ulnar fracture:  Patient last seen on 02/17/2022 in which she was recovering from distal ulnar fracture from an impaction injury  She had been using a cock-up wrist splint continuously and was recommended to transition out of the splint and start home exercises to help facilitate recovery she did defer formal PT  Of note, patient does have a noted history of fibromyalgia, lupus, thoracic outlet syndrome    Reports today that she has made improvement since last visit  She states the pain has not fully resolved but that the intensity is mild, intermittent and only aggravated with twisting/grabbing motions with her wrist   Otherwise she feels that she has full range of motion  She reports intermittent swelling over the ulnar aspect of her wrist but denies discoloration, numbness/tingling she states she no longer takes naproxen for the pain  She denies new injuries since last visit  She states she has fully transitioned out of universal wrist brace from last visit  She is satisfied with her progress and wishes to continue the home exercises does not feel attending formal PT        Medical, Surgical, Family, and Social History    Past Medical History:   Diagnosis Date    Fibromyalgia     Hypertension     Lupus ([address] ) 2017    Thoracic outlet syndrome      Past Surgical History:   Procedure Laterality Date    ENDOMETRIAL ABLATION      KNEE SURGERY      THORACIC SYMPATHETECTOMY       Social History   Social History     Substance and Sexual Activity   Alcohol Use Yes    Comment: occassionly      Social History     Substance and Sexual Activity   Drug Use Never     Social History     Tobacco Use   Smoking Status Current Every Day Smoker    Packs/day: 0 10    Types: Cigarettes   Smokeless Tobacco Never Used     Family History   Problem Relation Age of Onset    No Known Problems Mother     Cancer Father      No Known Allergies       Physical Exam  /80   Pulse 70   Temp (!) 97 2 °F (36 2 °C) (Temporal)   Ht 5' 2" (1 575 m)   Wt 72 1 kg (159 lb)   BMI 29 08 kg/m²     Constitutional:  see vital signs  Gen: well-developed, normocephalic/atraumatic, well-groomed  Eyes: No inflammation or discharge of conjunctiva or lids; sclera clear   Pharynx: no inflammation, lesion, or mass of lips  Pulmonary/Chest: Effort normal  No respiratory distress     NEURO: cranial nerves grossly intact  PSYCH:  Alert and oriented to person, place, and time; recent and remote memory intact; mood normal, no depression, anxiety, or agitation, judgment and insight good and intact     Right Hand Exam     Tenderness   Right hand tenderness location: +ulnar aspect of wrist (less painful than before per pt)    Range of Motion   Wrist   Extension: 40   Flexion: 70   Pronation: 90   Supination: 90     Muscle Strength   Wrist extension: 5/5   Wrist flexion: 5/5   : 5/5     Other   Erythema: absent  Right wrist pulse absent: 2+ radial     Comments:  Full digit MCP/PIP/DIP motion without malrotation or digital scissoring  Thumb MCP/DIP intact with opposition  Trace localized swelling along ulnar aspect of wrist; otherwise, no bruising, erythema  Sensation intact in radial/median/ulnar distribution                Procedures

## 2022-03-22 ENCOUNTER — OFFICE VISIT (OUTPATIENT)
Dept: URGENT CARE | Facility: CLINIC | Age: 52
End: 2022-03-22
Payer: COMMERCIAL

## 2022-03-22 VITALS
BODY MASS INDEX: 29.26 KG/M2 | TEMPERATURE: 97 F | OXYGEN SATURATION: 99 % | DIASTOLIC BLOOD PRESSURE: 98 MMHG | HEIGHT: 62 IN | HEART RATE: 74 BPM | WEIGHT: 159 LBS | RESPIRATION RATE: 16 BRPM | SYSTOLIC BLOOD PRESSURE: 168 MMHG

## 2022-03-22 DIAGNOSIS — N20.0 KIDNEY STONE: Primary | ICD-10-CM

## 2022-03-22 LAB
SL AMB  POCT GLUCOSE, UA: NEGATIVE
SL AMB LEUKOCYTE ESTERASE,UA: NEGATIVE
SL AMB POCT BILIRUBIN,UA: NEGATIVE
SL AMB POCT BLOOD,UA: ABNORMAL
SL AMB POCT CLARITY,UA: CLEAR
SL AMB POCT COLOR,UA: YELLOW
SL AMB POCT KETONES,UA: NEGATIVE
SL AMB POCT NITRITE,UA: NEGATIVE
SL AMB POCT PH,UA: 7
SL AMB POCT SPECIFIC GRAVITY,UA: 1.02
SL AMB POCT URINE PROTEIN: NEGATIVE
SL AMB POCT UROBILINOGEN: NEGATIVE

## 2022-03-22 PROCEDURE — 81002 URINALYSIS NONAUTO W/O SCOPE: CPT

## 2022-03-22 PROCEDURE — 99213 OFFICE O/P EST LOW 20 MIN: CPT

## 2022-03-22 RX ORDER — NAPROXEN 250 MG/1
250 TABLET ORAL 2 TIMES DAILY WITH MEALS
COMMUNITY

## 2022-03-22 RX ORDER — KETOROLAC TROMETHAMINE 10 MG/1
10 TABLET, FILM COATED ORAL EVERY 6 HOURS PRN
Qty: 5 TABLET | Refills: 0 | Status: SHIPPED | OUTPATIENT
Start: 2022-03-22

## 2022-03-22 RX ORDER — MULTIVITAMIN
1 CAPSULE ORAL DAILY
COMMUNITY

## 2022-03-22 NOTE — PROGRESS NOTES
[facility]        NAME: Lisseth Johnston is a 46 y o  female  : 1970    MRN: [id number]  DATE: 2022  TIME: 2:43 PM    Assessment and Plan   Kidney stone [N20 0]  1  Kidney stone  POCT urine dip    ketorolac (TORADOL) 10 mg tablet     POCT urine dip- positive for moderate blood  Patient Instructions     Take Toradol as prescribed  Increase clear fluid consumption  Strain your urine  Follow-up with urology  Follow-up with PCP  Go to ED for worsening symptoms  Chief Complaint     Chief Complaint   Patient presents with    Flank Pain     right side - radiated to front and down the r groin         History of Present Illness       Patient reports right flank pain that started yesterday  She reports 2 other episodes of right flank pain over the last month that resolved on their own prior to seeking medical care  She denies nausea, vomiting, abdominal pain  She denies urinary complaints  She denies trying alleviating factors  She denies aggravating factors  Review of Systems   Review of Systems   Constitutional: Negative for chills, fatigue and fever  Gastrointestinal: Negative for abdominal pain, diarrhea, nausea and vomiting  Genitourinary: Positive for flank pain  Negative for difficulty urinating, dysuria, frequency, hematuria, pelvic pain and urgency  All other systems reviewed and are negative          Current Medications       Current Outpatient Medications:     amLODIPine (NORVASC) 5 mg tablet, Take 5 mg by mouth daily, Disp: , Rfl:     ibuprofen (MOTRIN) 200 mg tablet, Take by mouth every 6 (six) hours as needed for mild pain, Disp: , Rfl:     Multiple Vitamin (multivitamin) capsule, Take 1 capsule by mouth daily, Disp: , Rfl:     naproxen (NAPROSYN) 250 mg tablet, Take 250 mg by mouth 2 (two) times a day with meals, Disp: , Rfl:     cyclobenzaprine (FLEXERIL) 10 mg tablet, Take 1 tablet (10 mg total) by mouth 3 (three) times a day as needed for muscle spasms (Patient not taking: Reported on 1/3/2022 ), Disp: 20 tablet, Rfl: 0    Diclofenac Sodium (VOLTAREN) 1 %, Apply 2 g topically 4 (four) times a day (Patient not taking: Reported on 3/22/2022 ), Disp: 100 g, Rfl: 3    ketorolac (TORADOL) 10 mg tablet, Take 1 tablet (10 mg total) by mouth every 6 (six) hours as needed for moderate pain, Disp: 5 tablet, Rfl: 0    ondansetron (ZOFRAN) 8 mg tablet, Take 1 tablet (8 mg total) by mouth every 8 (eight) hours as needed for nausea or vomiting (Patient not taking: Reported on 2/17/2022 ), Disp: 8 tablet, Rfl: 0    Current Allergies     Allergies as of 03/22/2022    (No Known Allergies)            The following portions of the patient's history were reviewed and updated as appropriate: allergies, current medications, past family history, past medical history, past social history, past surgical history and problem list      Past Medical History:   Diagnosis Date    Fibromyalgia     Hypertension     Lupus ([facility] [address] ) 2017    Thoracic outlet syndrome        Past Surgical History:   Procedure Laterality Date    APPENDECTOMY      ENDOMETRIAL ABLATION      KNEE SURGERY      THORACIC SYMPATHETECTOMY         Family History   Problem Relation Age of Onset    Osteoporosis Mother     Cancer Father          Medications have been verified  Objective   /98   Pulse 74   Temp (!) 97 °F (36 1 °C)   Resp 16   Ht 5' 2" (1 575 m)   Wt 72 1 kg (159 lb)   SpO2 99%   BMI 29 08 kg/m²        Physical Exam     Physical Exam  Vitals and nursing note reviewed  Constitutional:       General: She is not in acute distress  Appearance: Normal appearance  She is not toxic-appearing  HENT:      Head: Normocephalic and atraumatic  Right Ear: External ear normal       Left Ear: External ear normal       Nose: Nose normal       Mouth/Throat:      Mouth: Mucous membranes are moist       Pharynx: Oropharynx is clear  Eyes:      General: No scleral icterus  Right eye: No discharge  Left eye: No discharge  Conjunctiva/sclera: Conjunctivae normal    Cardiovascular:      Rate and Rhythm: Normal rate  Pulmonary:      Effort: Pulmonary effort is normal    Abdominal:      Palpations: Abdomen is soft  Tenderness: There is no abdominal tenderness  There is right CVA tenderness  There is no left CVA tenderness  Musculoskeletal:         General: Normal range of motion  Cervical back: Normal range of motion  Skin:     General: Skin is warm and dry  Neurological:      General: No focal deficit present  Mental Status: She is alert and oriented to person, place, and time     Psychiatric:         Mood and Affect: Mood normal          Behavior: Behavior normal

## 2022-03-22 NOTE — PATIENT INSTRUCTIONS
Take Toradol as prescribed  Increase clear fluid consumption  Strain your urine  Follow-up with urology  Follow-up with PCP  Go to ED for worsening symptoms  Kidney Stones   WHAT YOU NEED TO KNOW:   Kidney stones form in the urinary system when the water and waste in your urine are out of balance  When this happens, certain types of waste crystals separate from the urine  The crystals build up and form kidney stones  You may have more than one kidney stone  DISCHARGE INSTRUCTIONS:   Return to the emergency department if:   · You are vomiting and it is not relieved with medicine  Call your doctor or kidney specialist if:   · You have a fever  · You have trouble urinating  · You see blood in your urine  · You have severe pain  · You have any questions or concerns about your condition or care  Medicines: You may need any of the following:  · NSAIDs , such as ibuprofen, help decrease swelling, pain, and fever  This medicine is available with or without a doctor's order  NSAIDs can cause stomach bleeding or kidney problems in certain people  If you take blood thinner medicine, always ask your healthcare provider if NSAIDs are safe for you  Always read the medicine label and follow directions  · Acetaminophen  decreases pain and fever  It is available without a doctor's order  Ask how much to take and how often to take it  Follow directions  Read the labels of all other medicines you are using to see if they also contain acetaminophen, or ask your doctor or pharmacist  Acetaminophen can cause liver damage if not taken correctly  Do not use more than 4 grams (4,000 milligrams) total of acetaminophen in one day  · Prescription pain medicine  may be given  Ask your healthcare provider how to take this medicine safely  Some prescription pain medicines contain acetaminophen  Do not take other medicines that contain acetaminophen without talking to your healthcare provider   Too much acetaminophen may cause liver damage  Prescription pain medicine may cause constipation  Ask your healthcare provider how to prevent or treat constipation  · Medicines  to balance your electrolytes may be needed  · Take your medicine as directed  Contact your healthcare provider if you think your medicine is not helping or if you have side effects  Tell him or her if you are allergic to any medicine  Keep a list of the medicines, vitamins, and herbs you take  Include the amounts, and when and why you take them  Bring the list or the pill bottles to follow-up visits  Carry your medicine list with you in case of an emergency  What you can do to manage kidney stones:   · Drink more liquids  Your healthcare provider may tell you to drink at least 8 to 12 (eight-ounce) cups of liquids each day  This helps flush out the kidney stones when you urinate  Water is the best liquid to drink  · Strain your urine every time you go to the bathroom  Urinate through a strainer or a piece of thin cloth to catch the stones  Take the stones to your healthcare provider so they can be sent to the lab for tests  This will help your healthcare providers plan the best treatment for you  · Ask if you should avoid any foods  You may need to limit oxalate  Oxalate is a chemical found in some plant foods  The most common type of kidney stone is made up of crystals that contain calcium and oxalate  Your healthcare provider or dietitian may recommend that you limit oxalate if you get this type of kidney stone often  You may need to limit how much sodium (salt) or protein you eat  Ask for information about the best foods for you  · Be physically active as directed  Your stones may pass more easily if you stay active  Physical activity can also help you manage your weight  Ask about the best activities for you  After you pass the kidney stones:   Your healthcare provider may  order a 24-hour urine test  Results from a 24-hour urine test will help your healthcare provider plan ways to prevent more stones from forming  Your healthcare provider will give you more instructions  Follow up with your doctor or kidney specialist as directed:  Write down your questions so you remember to ask them during your visits  © Copyright [employer] 2022 Information is for End User's use only and may not be sold, redistributed or otherwise used for commercial purposes  All illustrations and images included in CareNotes® are the copyrighted property of [employer]  or [facility] Maite Duncan   The above information is an  only  It is not intended as medical advice for individual conditions or treatments  Talk to your doctor, nurse or pharmacist before following any medical regimen to see if it is safe and effective for you

## 2022-10-20 ENCOUNTER — HOSPITAL ENCOUNTER (OUTPATIENT)
Dept: RADIOLOGY | Facility: CLINIC | Age: 52
End: 2022-10-20
Payer: COMMERCIAL

## 2022-10-20 DIAGNOSIS — S52.614D CLOSED NONDISPLACED FRACTURE OF STYLOID PROCESS OF RIGHT ULNA WITH ROUTINE HEALING, SUBSEQUENT ENCOUNTER: ICD-10-CM

## 2022-10-20 PROCEDURE — 77080 DXA BONE DENSITY AXIAL: CPT

## 2022-10-31 ENCOUNTER — OFFICE VISIT (OUTPATIENT)
Dept: OBGYN CLINIC | Facility: CLINIC | Age: 52
End: 2022-10-31

## 2022-10-31 ENCOUNTER — HOSPITAL ENCOUNTER (OUTPATIENT)
Dept: RADIOLOGY | Facility: CLINIC | Age: 52
Discharge: HOME | End: 2022-10-31

## 2022-10-31 VITALS
DIASTOLIC BLOOD PRESSURE: 90 MMHG | SYSTOLIC BLOOD PRESSURE: 130 MMHG | TEMPERATURE: 97.7 F | HEIGHT: 62 IN | BODY MASS INDEX: 27.57 KG/M2 | WEIGHT: 149.8 LBS

## 2022-10-31 DIAGNOSIS — M25.511 CHRONIC RIGHT SHOULDER PAIN: ICD-10-CM

## 2022-10-31 DIAGNOSIS — M79.621 PAIN IN RIGHT UPPER ARM: Primary | ICD-10-CM

## 2022-10-31 DIAGNOSIS — G89.29 CHRONIC RIGHT SHOULDER PAIN: ICD-10-CM

## 2022-10-31 DIAGNOSIS — M79.621 PAIN IN RIGHT UPPER ARM: ICD-10-CM

## 2022-10-31 DIAGNOSIS — M85.80 OSTEOPENIA, UNSPECIFIED LOCATION: ICD-10-CM

## 2022-10-31 NOTE — PATIENT INSTRUCTIONS
I recommend vitamin D 800-1000 IUs (international units) and Calcium 1200mg per day to help promote fracture healing  Calcium pills can lead to constipation and I recommend increasing calcium through the diet via dairy such as yogurt, cheese, or milk as tolerated  Vitamin D is usually taken by mouth in pill form over the counter  Sometimes vitamin D levels are too low and physicians may consider checking a blood test to see if you require extra Vitamin D for catch-up dosing if you are already low

## 2022-10-31 NOTE — PROGRESS NOTES
1  Pain in right upper arm  XR shoulder 2+ vw right    XR humerus right   2  Osteopenia, unspecified location     3  Chronic right shoulder pain  XR shoulder 2+ vw right    XR humerus right     Orders Placed This Encounter   Procedures   • XR shoulder 2+ vw right   • XR humerus right        Imaging Studies (I personally reviewed images in PACS and report):    • X-ray right shoulder 10/31/2022:  Mild inferior glenohumeral arthritic changes  Otherwise no acute osseous abnormalities  • X-ray right humerus 10/31/2022:  No acute osseous abnormalities  • DEXA scan 10/20/2022:  1  Based on the [facility] classification, the T-score of -1 4 at the left femoral neck is consistent with low bone mineral density  2   Any secondary causes of low bone mineral density should be excluded prior to treatment, if clinically indicated  3   A daily intake of at least 1200 mg calcium and 800 to 1000 IU of Vitamin D, as well as weight bearing and muscle strengthening exercise, fall prevention and avoidance of tobacco and excessive alcohol intake as basic preventive measures are suggested  4   Repeat DXA  in 18 - 24 months, on the same machine, as clinically indicated  • X-ray right wrist 2/14/2022: Non-displaced distal ulnar styloid fracture with ongoing healing as evidenced overlying callus formation  IMPRESSION:  • Chronic right shoulder/upper arm pain has been ongoing for 3 months  • Reports a precipitating injury after handling a horse, and havng her arm pulled away from her body  She denies a popping sensation but states she would continuous pain of her general upper arm radiates into her right shoulder  DDx: myofascial pain, deltoid/rotator cuff strain, bursitis  • Recent DEXA scan noting osteopenia     Other factors:  Reported h/o fibromyalgia, TOS, lupus    PLAN:    • Clinical exam and radiographic imaging reviewed with patient today, with impression as per above   I have discussed with the patient the pathophysiology of this diagnosis and reviewed how the examination correlates with this diagnosis  • Obtained radiographic imaging of her right shoulder and humerus today as noted above  Will follow up official radiology interpretation  • Recommended conservative treatment at this time p r n  Use heat therapy 20 minutes on/off, range of motion and stretching activities with right shoulder/upper extremity  I did offer home exercise and formal PT but patient prefers to work on her exercises for now  I did offer a subacromial cortisone injection as some of her symptoms do seem to be consistent with a impingement/bursitis symptomatology but patient prefers to defer this option for now as well  Return if symptoms worsen or fail to improve  Portions of the record may have been created with voice recognition software  Occasional wrong word or "sound a like" substitutions may have occurred due to the inherent limitations of voice recognition software  Read the chart carefully and recognize, using context, where substitutions have occurred  CHIEF COMPLAINT:  Right upper arm/shoulder pain      HPI:  Agnieszka Faye is a 46 y o  female  who presents for     Visit 10/31/2022:  Initial evaluation right wrist/arm pain  Of note patient has a history of fibromyalgia, lupus, thoracic outlet syndrome  Patient here today in regards to chronic right upper arm and shoulder pain approximately 3 months  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 in approximately 3 months ago her arm was pulled/lexy in a abducted externally rotated position and caused immediate pain and stiffness of her right shoulder/upper arm  She denies a popping sensation  She describes pain as sharp throbbing in mainly aggravated with lifting/pushing/pulling  She states she is sensitive to palpation over her upper arm  She denies any swelling or discoloration  He states the pain can sometimes radiate up to her shoulder    She denies any clicking/popping of her shoulder  She states she has mostly intact range of motion movements denies any clicking/popping of her shoulder  She has been applying heat and avoiding lifting with her right arm mostly due to her being concerned she may have injured something  She is aware that due to her fibromyalgia condition she can pain that lingers longer than usual   She has no imaging of her right upper arm and shoulder since the incident  Patient has also obtained a DEXA scan and recently as well noting osteopenia    Medical, Surgical, Family, and Social History    Past Medical History:   Diagnosis Date   • Fibromyalgia    • Hypertension    • Lupus ([facility] [address] ) 2017   • Thoracic outlet syndrome      Past Surgical History:   Procedure Laterality Date   • APPENDECTOMY     • ENDOMETRIAL ABLATION     • KNEE SURGERY     • THORACIC SYMPATHETECTOMY       Social History   Social History     Substance and Sexual Activity   Alcohol Use Yes    Comment: occassionly      Social History     Substance and Sexual Activity   Drug Use Never     Social History     Tobacco Use   Smoking Status Current Every Day Smoker   • Packs/day: 0 10   • Types: Cigarettes   Smokeless Tobacco Never Used     Family History   Problem Relation Age of Onset   • Osteoporosis Mother    • Cancer Father      No Known Allergies       Physical Exam  /90 (BP Location: Left arm, Patient Position: Sitting, Cuff Size: Standard)   Temp 97 7 °F (36 5 °C)   Ht 5' 2" (1 575 m)   Wt 67 9 kg (149 lb 12 8 oz)   BMI 27 40 kg/m²     Constitutional:  see vital signs  Gen: well-developed, normocephalic/atraumatic, well-groomed  Eyes: No inflammation or discharge of conjunctiva or lids; sclera clear   Pharynx: no inflammation, lesion, or mass of lips  Pulmonary/Chest: Effort normal  No respiratory distress         Ortho Exam:  Shoulder exam:       RIGHT    Inspection Erythema None     Swelling None     Increased Warmth None    Rotator cuff ER 5/5     IR 5/5     Abduction 5/5 Aggravates general shoulder pain    Resisted flexion 5/5     Resisted extension 4+/5 Aggravates upper arm pain along tricipital regigon   ROM      Abduction 160     ER0 60     IRb Lower thoracic Aggravates shoulder   TTP:  + globally along the upper arm especially over the posterior lateral aspects along the triceps    She is also tender at the insertion of the rotator cuff on the lateral shoulder and over the anterior aspect of her glenohumeral joint line    Special Tests: O'Adonis  (FF 90, add 10, resist thumbs up-, resist thumbs down+) Negative slap    Cross body Adduction Negative     Speeds  Negative     Yergason's Negative     Drop arm Negative     Neer Positive     Rubio Positive        UE NV Exam: +2 Radial pulses                 Procedures

## 2022-11-26 ENCOUNTER — ANESTHESIA EVENT (INPATIENT)
Dept: PERIOP | Facility: HOSPITAL | Age: 52
End: 2022-11-26

## 2022-11-26 ENCOUNTER — HOSPITAL ENCOUNTER (EMERGENCY)
Facility: HOSPITAL | Age: 52
Discharge: HOME | End: 2022-11-26
Attending: EMERGENCY MEDICINE

## 2022-11-26 ENCOUNTER — OFFICE VISIT (OUTPATIENT)
Dept: URGENT CARE | Facility: CLINIC | Age: 52
End: 2022-11-26

## 2022-11-26 ENCOUNTER — HOSPITAL ENCOUNTER (INPATIENT)
Facility: HOSPITAL | Age: 52
LOS: 1 days | Discharge: HOME | End: 2022-11-27
Attending: STUDENT IN AN ORGANIZED HEALTH CARE EDUCATION/TRAINING PROGRAM | Admitting: INTERNAL MEDICINE

## 2022-11-26 ENCOUNTER — ANESTHESIA (INPATIENT)
Dept: PERIOP | Facility: HOSPITAL | Age: 52
End: 2022-11-26

## 2022-11-26 ENCOUNTER — APPOINTMENT (EMERGENCY)
Dept: CT IMAGING | Facility: HOSPITAL | Age: 52
End: 2022-11-26

## 2022-11-26 ENCOUNTER — APPOINTMENT (OUTPATIENT)
Dept: RADIOLOGY | Facility: HOSPITAL | Age: 52
End: 2022-11-26

## 2022-11-26 VITALS
HEART RATE: 92 BPM | DIASTOLIC BLOOD PRESSURE: 69 MMHG | WEIGHT: 145 LBS | HEIGHT: 62 IN | OXYGEN SATURATION: 94 % | SYSTOLIC BLOOD PRESSURE: 142 MMHG | TEMPERATURE: 100.2 F | BODY MASS INDEX: 26.68 KG/M2 | RESPIRATION RATE: 18 BRPM

## 2022-11-26 VITALS
RESPIRATION RATE: 18 BRPM | OXYGEN SATURATION: 100 % | DIASTOLIC BLOOD PRESSURE: 96 MMHG | WEIGHT: 149 LBS | TEMPERATURE: 100 F | HEIGHT: 62 IN | SYSTOLIC BLOOD PRESSURE: 140 MMHG | HEART RATE: 92 BPM | BODY MASS INDEX: 27.42 KG/M2

## 2022-11-26 DIAGNOSIS — N39.0 UTI (URINARY TRACT INFECTION): ICD-10-CM

## 2022-11-26 DIAGNOSIS — R82.90 ABNORMAL URINE FINDINGS: ICD-10-CM

## 2022-11-26 DIAGNOSIS — N20.1 URETERAL CALCULUS: Primary | ICD-10-CM

## 2022-11-26 DIAGNOSIS — N20.1 URETERAL STONE: ICD-10-CM

## 2022-11-26 DIAGNOSIS — N13.30 HYDROURETERONEPHROSIS: ICD-10-CM

## 2022-11-26 DIAGNOSIS — R39.9 UTI SYMPTOMS: ICD-10-CM

## 2022-11-26 DIAGNOSIS — N13.30 HYDROURETERONEPHROSIS: Primary | ICD-10-CM

## 2022-11-26 DIAGNOSIS — R10.9 FLANK PAIN: Primary | ICD-10-CM

## 2022-11-26 DIAGNOSIS — N20.1 URETERAL CALCULUS: ICD-10-CM

## 2022-11-26 PROBLEM — J43.2 CENTRILOBULAR EMPHYSEMA (HCC): Status: ACTIVE | Noted: 2020-08-10

## 2022-11-26 PROBLEM — Z72.0 TOBACCO ABUSE: Status: ACTIVE | Noted: 2017-07-03

## 2022-11-26 PROBLEM — I10 HYPERTENSION: Status: ACTIVE | Noted: 2020-08-17

## 2022-11-26 PROBLEM — E87.6 HYPOKALEMIA: Status: ACTIVE | Noted: 2022-11-26

## 2022-11-26 LAB
ALBUMIN SERPL BCP-MCNC: 3 G/DL (ref 3.5–5)
ALP SERPL-CCNC: 292 U/L (ref 46–116)
ALT SERPL W P-5'-P-CCNC: 62 U/L (ref 12–78)
ANION GAP SERPL CALCULATED.3IONS-SCNC: 12 MMOL/L (ref 4–13)
AST SERPL W P-5'-P-CCNC: 50 U/L (ref 5–45)
BACTERIA UR QL AUTO: ABNORMAL /HPF
BASOPHILS # BLD AUTO: 0.04 THOUSANDS/ÂΜL (ref 0–0.1)
BASOPHILS NFR BLD AUTO: 0 % (ref 0–1)
BILIRUB SERPL-MCNC: 1.77 MG/DL (ref 0.2–1)
BILIRUB UR QL STRIP: ABNORMAL
BUN SERPL-MCNC: 12 MG/DL (ref 5–25)
CALCIUM ALBUM COR SERPL-MCNC: 10.1 MG/DL (ref 8.3–10.1)
CALCIUM SERPL-MCNC: 9.3 MG/DL (ref 8.3–10.1)
CHLORIDE SERPL-SCNC: 98 MMOL/L (ref 96–108)
CLARITY UR: ABNORMAL
CO2 SERPL-SCNC: 26 MMOL/L (ref 21–32)
COLOR UR: ABNORMAL
CREAT SERPL-MCNC: 1.06 MG/DL (ref 0.6–1.3)
EOSINOPHIL # BLD AUTO: 0.01 THOUSAND/ÂΜL (ref 0–0.61)
EOSINOPHIL NFR BLD AUTO: 0 % (ref 0–6)
ERYTHROCYTE [DISTWIDTH] IN BLOOD BY AUTOMATED COUNT: 12 % (ref 11.6–15.1)
FLUAV RNA RESP QL NAA+PROBE: NEGATIVE
FLUBV RNA RESP QL NAA+PROBE: NEGATIVE
GFR SERPL CREATININE-BSD FRML MDRD: 60 ML/MIN/1.73SQ M
GLUCOSE SERPL-MCNC: 112 MG/DL (ref 65–140)
GLUCOSE UR STRIP-MCNC: NEGATIVE MG/DL
HCT VFR BLD AUTO: 39.1 % (ref 34.8–46.1)
HGB BLD-MCNC: 13.1 G/DL (ref 11.5–15.4)
HGB UR QL STRIP.AUTO: ABNORMAL
IMM GRANULOCYTES # BLD AUTO: 0.06 THOUSAND/UL (ref 0–0.2)
IMM GRANULOCYTES NFR BLD AUTO: 1 % (ref 0–2)
KETONES UR STRIP-MCNC: ABNORMAL MG/DL
LACTATE SERPL-SCNC: 1.2 MMOL/L (ref 0.5–2)
LEUKOCYTE ESTERASE UR QL STRIP: ABNORMAL
LYMPHOCYTES # BLD AUTO: 1.17 THOUSANDS/ÂΜL (ref 0.6–4.47)
LYMPHOCYTES NFR BLD AUTO: 10 % (ref 14–44)
MCH RBC QN AUTO: 31.3 PG (ref 26.8–34.3)
MCHC RBC AUTO-ENTMCNC: 33.5 G/DL (ref 31.4–37.4)
MCV RBC AUTO: 94 FL (ref 82–98)
MONOCYTES # BLD AUTO: 1.28 THOUSAND/ÂΜL (ref 0.17–1.22)
MONOCYTES NFR BLD AUTO: 11 % (ref 4–12)
NEUTROPHILS # BLD AUTO: 9.33 THOUSANDS/ÂΜL (ref 1.85–7.62)
NEUTS SEG NFR BLD AUTO: 78 % (ref 43–75)
NITRITE UR QL STRIP: POSITIVE
NON-SQ EPI CELLS URNS QL MICRO: ABNORMAL /HPF
NRBC BLD AUTO-RTO: 0 /100 WBCS
PH UR STRIP.AUTO: 5.5 [PH]
PLATELET # BLD AUTO: 239 THOUSANDS/UL (ref 149–390)
PMV BLD AUTO: 10.2 FL (ref 8.9–12.7)
POTASSIUM SERPL-SCNC: 3.2 MMOL/L (ref 3.5–5.3)
PROT SERPL-MCNC: 8 G/DL (ref 6.4–8.4)
PROT UR STRIP-MCNC: ABNORMAL MG/DL
RBC # BLD AUTO: 4.18 MILLION/UL (ref 3.81–5.12)
RBC #/AREA URNS AUTO: ABNORMAL /HPF
RSV RNA RESP QL NAA+PROBE: NEGATIVE
SARS-COV-2 RNA RESP QL NAA+PROBE: NEGATIVE
SL AMB  POCT GLUCOSE, UA: ABNORMAL
SL AMB LEUKOCYTE ESTERASE,UA: ABNORMAL
SL AMB POCT BILIRUBIN,UA: ABNORMAL
SL AMB POCT BLOOD,UA: ABNORMAL
SL AMB POCT CLARITY,UA: ABNORMAL
SL AMB POCT COLOR,UA: ABNORMAL
SL AMB POCT KETONES,UA: ABNORMAL
SL AMB POCT NITRITE,UA: ABNORMAL
SL AMB POCT PH,UA: 7
SL AMB POCT SPECIFIC GRAVITY,UA: 1.03
SL AMB POCT URINE PROTEIN: ABNORMAL
SL AMB POCT UROBILINOGEN: 8
SODIUM SERPL-SCNC: 136 MMOL/L (ref 135–147)
SP GR UR STRIP.AUTO: 1.02 (ref 1–1.03)
UROBILINOGEN UR QL STRIP.AUTO: 4 E.U./DL
WBC # BLD AUTO: 11.89 THOUSAND/UL (ref 4.31–10.16)
WBC #/AREA URNS AUTO: ABNORMAL /HPF

## 2022-11-26 RX ORDER — OXYCODONE HYDROCHLORIDE 5 MG/1
5 TABLET ORAL EVERY 4 HOURS PRN
Status: DISCONTINUED | OUTPATIENT
Start: 2022-11-26 | End: 2022-11-27 | Stop reason: HOSPADM

## 2022-11-26 RX ORDER — TAMSULOSIN HYDROCHLORIDE 0.4 MG/1
0.4 CAPSULE ORAL ONCE
Status: COMPLETED | OUTPATIENT
Start: 2022-11-26 | End: 2022-11-26

## 2022-11-26 RX ORDER — NICOTINE 21 MG/24HR
1 PATCH, TRANSDERMAL 24 HOURS TRANSDERMAL DAILY
Status: DISCONTINUED | OUTPATIENT
Start: 2022-11-27 | End: 2022-11-27 | Stop reason: HOSPADM

## 2022-11-26 RX ORDER — OXYCODONE HYDROCHLORIDE 10 MG/1
10 TABLET ORAL EVERY 4 HOURS PRN
Status: DISCONTINUED | OUTPATIENT
Start: 2022-11-26 | End: 2022-11-27 | Stop reason: HOSPADM

## 2022-11-26 RX ORDER — DIPHENHYDRAMINE HCL 25 MG
25 TABLET ORAL
Status: DISCONTINUED | OUTPATIENT
Start: 2022-11-26 | End: 2022-11-27 | Stop reason: HOSPADM

## 2022-11-26 RX ORDER — ONDANSETRON 2 MG/ML
4 INJECTION INTRAMUSCULAR; INTRAVENOUS EVERY 6 HOURS PRN
Status: DISCONTINUED | OUTPATIENT
Start: 2022-11-26 | End: 2022-11-27 | Stop reason: HOSPADM

## 2022-11-26 RX ORDER — CALCIUM CARBONATE 200(500)MG
1000 TABLET,CHEWABLE ORAL DAILY PRN
Status: DISCONTINUED | OUTPATIENT
Start: 2022-11-26 | End: 2022-11-27 | Stop reason: HOSPADM

## 2022-11-26 RX ORDER — IBUPROFEN 400 MG/1
800 TABLET ORAL ONCE
Status: COMPLETED | OUTPATIENT
Start: 2022-11-26 | End: 2022-11-26

## 2022-11-26 RX ORDER — ENOXAPARIN SODIUM 100 MG/ML
40 INJECTION SUBCUTANEOUS DAILY
Status: DISCONTINUED | OUTPATIENT
Start: 2022-11-27 | End: 2022-11-27 | Stop reason: HOSPADM

## 2022-11-26 RX ORDER — SODIUM CHLORIDE, SODIUM GLUCONATE, SODIUM ACETATE, POTASSIUM CHLORIDE, MAGNESIUM CHLORIDE, SODIUM PHOSPHATE, DIBASIC, AND POTASSIUM PHOSPHATE .53; .5; .37; .037; .03; .012; .00082 G/100ML; G/100ML; G/100ML; G/100ML; G/100ML; G/100ML; G/100ML
125 INJECTION, SOLUTION INTRAVENOUS CONTINUOUS
Status: DISCONTINUED | OUTPATIENT
Start: 2022-11-26 | End: 2022-11-27 | Stop reason: HOSPADM

## 2022-11-26 RX ORDER — ACETAMINOPHEN 325 MG/1
650 TABLET ORAL ONCE
Status: COMPLETED | OUTPATIENT
Start: 2022-11-26 | End: 2022-11-26

## 2022-11-26 RX ORDER — MAGNESIUM HYDROXIDE 1200 MG/15ML
LIQUID ORAL AS NEEDED
Status: DISCONTINUED | OUTPATIENT
Start: 2022-11-26 | End: 2022-11-27 | Stop reason: HOSPADM

## 2022-11-26 RX ORDER — ACETAMINOPHEN 325 MG/1
650 TABLET ORAL EVERY 6 HOURS PRN
Status: DISCONTINUED | OUTPATIENT
Start: 2022-11-26 | End: 2022-11-27 | Stop reason: HOSPADM

## 2022-11-26 RX ORDER — HYDROMORPHONE HCL/PF 1 MG/ML
1 SYRINGE (ML) INJECTION EVERY 4 HOURS PRN
Status: DISCONTINUED | OUTPATIENT
Start: 2022-11-26 | End: 2022-11-27 | Stop reason: HOSPADM

## 2022-11-26 RX ORDER — NICOTINE 21 MG/24HR
14 PATCH, TRANSDERMAL 24 HOURS TRANSDERMAL ONCE
Status: DISCONTINUED | OUTPATIENT
Start: 2022-11-26 | End: 2022-11-26 | Stop reason: HOSPADM

## 2022-11-26 RX ORDER — KETOROLAC TROMETHAMINE 30 MG/ML
15 INJECTION, SOLUTION INTRAMUSCULAR; INTRAVENOUS ONCE
Status: COMPLETED | OUTPATIENT
Start: 2022-11-26 | End: 2022-11-26

## 2022-11-26 RX ORDER — CEFTRIAXONE 1 G/50ML
1000 INJECTION, SOLUTION INTRAVENOUS EVERY 24 HOURS
Status: DISCONTINUED | OUTPATIENT
Start: 2022-11-26 | End: 2022-11-26 | Stop reason: HOSPADM

## 2022-11-26 RX ORDER — DOCUSATE SODIUM 100 MG/1
100 CAPSULE, LIQUID FILLED ORAL 2 TIMES DAILY
Status: DISCONTINUED | OUTPATIENT
Start: 2022-11-26 | End: 2022-11-27 | Stop reason: HOSPADM

## 2022-11-26 RX ORDER — LOSARTAN POTASSIUM 100 MG/1
100 TABLET ORAL DAILY
COMMUNITY
Start: 2022-09-20

## 2022-11-26 RX ORDER — POTASSIUM CHLORIDE 20 MEQ/1
40 TABLET, EXTENDED RELEASE ORAL ONCE
Status: COMPLETED | OUTPATIENT
Start: 2022-11-26 | End: 2022-11-26

## 2022-11-26 RX ORDER — SENNOSIDES 8.6 MG
2 TABLET ORAL DAILY PRN
Status: DISCONTINUED | OUTPATIENT
Start: 2022-11-26 | End: 2022-11-27 | Stop reason: HOSPADM

## 2022-11-26 RX ADMIN — TAMSULOSIN HYDROCHLORIDE 0.4 MG: 0.4 CAPSULE ORAL at 17:01

## 2022-11-26 RX ADMIN — DOCUSATE SODIUM 100 MG: 100 CAPSULE, LIQUID FILLED ORAL at 20:53

## 2022-11-26 RX ADMIN — NICOTINE 14 MG: 14 PATCH, EXTENDED RELEASE TRANSDERMAL at 17:39

## 2022-11-26 RX ADMIN — IBUPROFEN 800 MG: 400 TABLET, FILM COATED ORAL at 18:22

## 2022-11-26 RX ADMIN — KETOROLAC TROMETHAMINE 15 MG: 30 INJECTION, SOLUTION INTRAMUSCULAR at 14:58

## 2022-11-26 RX ADMIN — ACETAMINOPHEN 650 MG: 325 TABLET ORAL at 17:39

## 2022-11-26 RX ADMIN — CEFTRIAXONE 1000 MG: 1 INJECTION, SOLUTION INTRAVENOUS at 17:02

## 2022-11-26 RX ADMIN — POTASSIUM CHLORIDE 40 MEQ: 1500 TABLET, EXTENDED RELEASE ORAL at 20:54

## 2022-11-26 RX ADMIN — SODIUM CHLORIDE 1000 ML: 0.9 INJECTION, SOLUTION INTRAVENOUS at 14:55

## 2022-11-26 RX ADMIN — SODIUM CHLORIDE, SODIUM ACETATE ANHYDROUS, SODIUM GLUCONATE, POTASSIUM CHLORIDE, AND MAGNESIUM CHLORIDE 125 ML/HR: 526; 222; 502; 37; 30 INJECTION, SOLUTION INTRAVENOUS at 20:54

## 2022-11-26 NOTE — TREATMENT PLAN
Nydia Walker is a 70-year-old female not previously known to our service presenting with acute right-sided flank pain  Our service was contacted as patient has mild/low-grade temperature and positive bacteria and innumerable white cells on microscopic urinalysis  She has mild leukocytosis and normal renal function  Interim plan  · Recommend transfer to [address] [place] [address] on the medical service  · Continue aggressive IV fluids and begin ceftriaxone  · Flomax and straining of urine  · Symptom management  · NPO after midnight  · Patient is added to the OR schedule at [address] [place] [address] in the a m  for cystoscopy, retrograde pyelography and right ureteral stent insertion  · Discussed with emergency room provider Maryjo Keita PA-C   · Patient access center has been contacted to arrange for bed assignment at [address] [place] [address]  · Patient is agreeable with plan  · She will be seen in formal urologic consultation at [address] [place] [address] by  attending on-call, Dr Yifan Shi preoperatively for consent 11/27

## 2022-11-26 NOTE — ED ATTENDING ATTESTATION
11/26/2022  IPete MD, saw and evaluated the patient  I have discussed the patient with the resident/non-physician practitioner and agree with the resident's/non-physician practitioner's findings, Plan of Care, and MDM as documented in the resident's/non-physician practitioner's note, except where noted  All available labs and Radiology studies were reviewed  I was present for key portions of any procedure(s) performed by the resident/non-physician practitioner and I was immediately available to provide assistance  At this point I agree with the current assessment done in the Emergency Department  I have conducted an independent evaluation of this patient a history and physical is as follows:    ED Course     Patient complains of right flank pain and intermittent fevers for the past week  No dysuria frequency  No nausea or vomiting  Has chronic diarrhea  No change in that  No recent cough or cold symptoms  Was seen in urgent care and sent here for further evaluation  On exam the patient is awake alert  Lungs are clear to auscultation  Heart is a regular rate and rhythm  Abdomen soft nontender good bowel sounds      Critical Care Time  Procedures

## 2022-11-26 NOTE — PATIENT INSTRUCTIONS
Pt going to the ER for further evaluation for right flank pain, abdnormal urine findings, fever   Discussed with pt to go to the ER

## 2022-11-26 NOTE — PROGRESS NOTES
NAME: Jessa Ordonez is a 46 y o  female  : 1970    MRN: [id number]    /96   Pulse 92   Temp 100 °F (37 8 °C)   Resp 18   Ht 5' 2" (1 575 m)   Wt 67 6 kg (149 lb)   SpO2 100%   BMI 27 25 kg/m²     Assessment and Plan   Flank pain [R10 9]  1  Flank pain  POCT urine dip    Transfer to other facility      2  UTI symptoms  Transfer to other facility      3  Abnormal urine findings            Ayde was seen today for flank pain  Diagnoses and all orders for this visit:    Flank pain  -     POCT urine dip  -     Transfer to other facility    UTI symptoms  -     Transfer to other facility    Abnormal urine findings        Patient Instructions   Patient Instructions   Pt going to the ER for further evaluation for right flank pain, abdnormal urine findings, fever  Discussed with pt to go to the ER      Proceed to the nearest ER if symptoms worsen, Follow up with your PCP  Continue to social distance, wash your hands, and wear your masks  Please continue to follow the CDC  gov guidelines daily for they are subject to change on COVID-19    Chief Complaint     Chief Complaint   Patient presents with   • Flank Pain     Right flank pain  2 months ago started with fever on and off;  States that this week fever is back with body aches(2days ago) & back pain; Was supposed to have a procedure done for kidneys in  for kidney stone that is stuck         History of Present Illness     47 yo F here today with right flank pain, constant, little relief with advil  She has had fever, took advil at 11, temp is 100 now  Pt has no suprapubic pain or dysfunction, pt denies having pressure, increased urination or increased frequency of urination  She has not been feeling good for a few days and feels exhausted  Review of Systems   Review of Systems   Constitutional: Positive for fatigue and fever  HENT: Negative  Eyes: Negative  Gastrointestinal: Negative for abdominal pain, diarrhea and nausea  Genitourinary: Positive for flank pain and hematuria  Negative for difficulty urinating, dyspareunia, dysuria, frequency, pelvic pain, urgency, vaginal discharge and vaginal pain  Musculoskeletal: Positive for back pain  Neurological: Positive for headaches           Current Medications       Current Outpatient Medications:   •  amLODIPine (NORVASC) 5 mg tablet, Take 5 mg by mouth daily, Disp: , Rfl:   •  Ascorbic Acid, Vitamin C, (VITAMIN C) 100 MG tablet, Take by mouth, Disp: , Rfl:   •  Cholecalciferol 50 MCG (2000 UT) CAPS, Take by mouth, Disp: , Rfl:   •  ibuprofen (MOTRIN) 200 mg tablet, Take by mouth every 6 (six) hours as needed for mild pain, Disp: , Rfl:   •  Multiple Vitamin (multivitamin) capsule, Take 1 capsule by mouth daily, Disp: , Rfl:   •  Potassium Gluconate 2 5 MEQ TABS, Take by mouth, Disp: , Rfl:   •  cyclobenzaprine (FLEXERIL) 10 mg tablet, Take 1 tablet (10 mg total) by mouth 3 (three) times a day as needed for muscle spasms (Patient not taking: Reported on 1/3/2022 ), Disp: 20 tablet, Rfl: 0  •  Diclofenac Sodium (VOLTAREN) 1 %, Apply 2 g topically 4 (four) times a day (Patient not taking: Reported on 3/22/2022), Disp: 100 g, Rfl: 3  •  ketorolac (TORADOL) 10 mg tablet, Take 1 tablet (10 mg total) by mouth every 6 (six) hours as needed for moderate pain, Disp: 5 tablet, Rfl: 0  •  naproxen (NAPROSYN) 250 mg tablet, Take 250 mg by mouth 2 (two) times a day with meals, Disp: , Rfl:   •  ondansetron (ZOFRAN) 8 mg tablet, Take 1 tablet (8 mg total) by mouth every 8 (eight) hours as needed for nausea or vomiting (Patient not taking: Reported on 2/17/2022 ), Disp: 8 tablet, Rfl: 0    Current Allergies     Allergies as of 11/26/2022   • (No Known Allergies)              Past Medical History:   Diagnosis Date   • Fibromyalgia    • Hypertension    • Lupus ([facility] [address] ) 2017   • Thoracic outlet syndrome        Past Surgical History:   Procedure Laterality Date   • APPENDECTOMY     • ENDOMETRIAL ABLATION • KNEE SURGERY     • THORACIC SYMPATHETECTOMY         Family History   Problem Relation Age of Onset   • Osteoporosis Mother    • Cancer Father          Medications have been verified  The following portions of the patient's history were reviewed and updated as appropriate: allergies, current medications, past family history, past medical history, past social history, past surgical history and problem list     Objective   /96   Pulse 92   Temp 100 °F (37 8 °C)   Resp 18   Ht 5' 2" (1 575 m)   Wt 67 6 kg (149 lb)   SpO2 100%   BMI 27 25 kg/m²      Physical Exam     Physical Exam  Constitutional:       Appearance: Normal appearance  She is well-developed and well-nourished  Cardiovascular:      Rate and Rhythm: Normal rate and regular rhythm  Heart sounds: Normal heart sounds  Pulmonary:      Effort: Pulmonary effort is normal       Breath sounds: Normal breath sounds  No decreased breath sounds or wheezing  Abdominal:      Palpations: Abdomen is soft  Tenderness: There is no abdominal tenderness  There is right CVA tenderness  There is no CVA tenderness or left CVA tenderness  Skin:     General: Skin is warm  Capillary Refill: Capillary refill takes less than 2 seconds  Findings: No rash  Neurological:      General: No focal deficit present  Mental Status: She is alert and oriented to person, place, and time  Psychiatric:         Behavior: Behavior is cooperative  Note: Portions of this record may have been created with voice recognition software  Occasional wrong word or "sound a like" substitutions may have occurred due to the inherent limitations of voice recognition software  Please read the chart carefully and recognize, using context, where substitutions have occurred  MARGOTH Duffy

## 2022-11-26 NOTE — ED PROVIDER NOTES
History  Chief Complaint   Patient presents with   • Flank Pain     Pt c/o intermittent right flank pain with fevers for past week  Pt seen at urgent care now instructing pt to come to ED per further evaluation  46year old female presents the emergency department for evaluation of fevers, flank pain  Patient states she has had intermittent fevers for the past week  States she thought she might be getting a cold however denied any cough and congestion  Reports she was feeling achy all over  States on Tuesday she noticed right-sided flank pain  States this has been intermittent since  Was seen at urgent care and instructed to come to the emergency department for further evaluation due to fever and flank pain  Patient states she does have history of kidney stones  States she was scheduled to have a surgical intervention on a kidney stone in June however was unable to make this appointment due to "playing phone tag" and the office being "short staffed " States her pain then resolved on its own and she thought the intervention would no longer really needed any way  Patient denies any dysuria or urinary frequency  She reports punching care informed her she did have some hematuria however denies any gross hematuria  States right now she has 6/10 rt flank pain and 8/10 overall body aches  Reports decreased appetite  Nausea this morning  No vomiting  No diarrhea or constipation        History provided by:  Patient  Flank Pain  Pain location:  R flank  Pain quality: sharp    Pain radiates to:  Does not radiate  Pain severity:  Moderate  Onset quality:  Sudden  Timing:  Constant  Progression:  Waxing and waning  Chronicity:  New  Ineffective treatments:  None tried  Associated symptoms: fever, hematuria and nausea (resloved)    Associated symptoms: no anorexia, no belching, no chest pain, no chills, no constipation, no cough, no diarrhea, no dysuria, no fatigue, no flatus, no hematemesis, no hematochezia, no melena, no shortness of breath, no sore throat, no vaginal bleeding, no vaginal discharge and no vomiting        Prior to Admission Medications   Prescriptions Last Dose Informant Patient Reported? Taking?    Ascorbic Acid, Vitamin C, (VITAMIN C) 100 MG tablet   Yes No   Sig: Take by mouth   Cholecalciferol 50 MCG (2000 UT) CAPS   Yes No   Sig: Take by mouth   Diclofenac Sodium (VOLTAREN) 1 %   No No   Sig: Apply 2 g topically 4 (four) times a day   Patient not taking: Reported on 3/22/2022   Multiple Vitamin (multivitamin) capsule   Yes No   Sig: Take 1 capsule by mouth daily   Potassium Gluconate 2 5 MEQ TABS   Yes No   Sig: Take by mouth   amLODIPine (NORVASC) 5 mg tablet   Yes No   Sig: Take 5 mg by mouth daily   cyclobenzaprine (FLEXERIL) 10 mg tablet   No No   Sig: Take 1 tablet (10 mg total) by mouth 3 (three) times a day as needed for muscle spasms   Patient not taking: Reported on 1/3/2022    ibuprofen (MOTRIN) 200 mg tablet   Yes No   Sig: Take by mouth every 6 (six) hours as needed for mild pain   ketorolac (TORADOL) 10 mg tablet   No No   Sig: Take 1 tablet (10 mg total) by mouth every 6 (six) hours as needed for moderate pain   naproxen (NAPROSYN) 250 mg tablet   Yes No   Sig: Take 250 mg by mouth 2 (two) times a day with meals   ondansetron (ZOFRAN) 8 mg tablet   No No   Sig: Take 1 tablet (8 mg total) by mouth every 8 (eight) hours as needed for nausea or vomiting   Patient not taking: Reported on 2/17/2022       Facility-Administered Medications: None       Past Medical History:   Diagnosis Date   • Fibromyalgia    • Hypertension    • Lupus ([facility] [address] ) 2017   • Thoracic outlet syndrome        Past Surgical History:   Procedure Laterality Date   • APPENDECTOMY     • ENDOMETRIAL ABLATION     • KNEE SURGERY     • THORACIC SYMPATHETECTOMY         Family History   Problem Relation Age of Onset   • Osteoporosis Mother    • Cancer Father      I have reviewed and agree with the history as documented  E-Cigarette/Vaping   • E-Cigarette Use Never User      E-Cigarette/Vaping Substances     Social History     Tobacco Use   • Smoking status: Every Day     Packs/day: 0 50     Types: Cigarettes   • Smokeless tobacco: Never   Vaping Use   • Vaping Use: Never used   Substance Use Topics   • Alcohol use: Yes     Comment: occassionly    • Drug use: Yes     Types: Marijuana     Comment: medical       Review of Systems   Constitutional: Positive for appetite change and fever  Negative for chills, diaphoresis and fatigue  HENT: Negative for congestion and sore throat  Respiratory: Negative for cough, choking, chest tightness and shortness of breath  Cardiovascular: Negative for chest pain, palpitations and leg swelling  Gastrointestinal: Positive for nausea (resloved)  Negative for abdominal pain, anorexia, constipation, diarrhea, flatus, hematemesis, hematochezia, melena and vomiting  Genitourinary: Positive for flank pain and hematuria  Negative for decreased urine volume, difficulty urinating, dyspareunia, dysuria, enuresis, frequency, genital sores, menstrual problem, pelvic pain, urgency, vaginal bleeding and vaginal discharge  Musculoskeletal: Positive for back pain  Negative for arthralgias  Skin: Negative  Negative for rash  Neurological: Negative  Physical Exam  Physical Exam  Vitals and nursing note reviewed  Constitutional:       General: She is not in acute distress  Appearance: Normal appearance  She is normal weight  She is not ill-appearing, toxic-appearing or diaphoretic  HENT:      Head: Normocephalic and atraumatic  Nose: Nose normal       Mouth/Throat:      Mouth: Mucous membranes are moist       Pharynx: Oropharynx is clear  No oropharyngeal exudate or posterior oropharyngeal erythema  Eyes:      Conjunctiva/sclera: Conjunctivae normal       Pupils: Pupils are equal, round, and reactive to light     Cardiovascular:      Rate and Rhythm: Regular rhythm  Tachycardia present  Pulmonary:      Effort: Pulmonary effort is normal  No respiratory distress  Breath sounds: Normal breath sounds  No stridor  No wheezing, rhonchi or rales  Chest:      Chest wall: No tenderness  Abdominal:      General: Abdomen is flat  Bowel sounds are normal       Palpations: Abdomen is soft  Tenderness: There is no abdominal tenderness  There is right CVA tenderness  There is no left CVA tenderness or guarding  Musculoskeletal:         General: Normal range of motion  Cervical back: Normal range of motion  Skin:     General: Skin is warm and dry  Capillary Refill: Capillary refill takes less than 2 seconds  Findings: No bruising, erythema or rash  Neurological:      General: No focal deficit present  Mental Status: She is alert and oriented to person, place, and time     Psychiatric:         Mood and Affect: Mood normal          Behavior: Behavior normal          Vital Signs  ED Triage Vitals [11/26/22 1420]   Temperature Pulse Respirations Blood Pressure SpO2   100 °F (37 8 °C) (!) 106 18 150/100 98 %      Temp Source Heart Rate Source Patient Position - Orthostatic VS BP Location FiO2 (%)   Temporal Monitor Sitting Left arm --      Pain Score       8           Vitals:    11/26/22 1630 11/26/22 1730 11/26/22 1800 11/26/22 1900   BP: 137/80 157/79 155/78 142/69   Pulse: 80 88 89 92   Patient Position - Orthostatic VS:             Visual Acuity      ED Medications  Medications   cefTRIAXone (ROCEPHIN) IVPB (premix in dextrose) 1,000 mg 50 mL (0 mg Intravenous Stopped 11/26/22 1732)   nicotine (NICODERM CQ) 14 mg/24hr TD 24 hr patch 14 mg (14 mg Transdermal Medication Applied 11/26/22 1739)   sodium chloride 0 9 % bolus 1,000 mL (0 mL Intravenous Stopped 11/26/22 1535)   ketorolac (TORADOL) injection 15 mg (15 mg Intravenous Given 11/26/22 1458)   tamsulosin (FLOMAX) capsule 0 4 mg (0 4 mg Oral Given 11/26/22 1701)   acetaminophen (TYLENOL) tablet 650 mg (650 mg Oral Given 11/26/22 1739)   ibuprofen (MOTRIN) tablet 800 mg (800 mg Oral Given 11/26/22 1822)       Diagnostic Studies  Results Reviewed     Procedure Component Value Units Date/Time    Urine Microscopic [[id number]]  (Abnormal) Collected: 11/26/22 1540    Lab Status: Final result Specimen: Urine, Clean Catch Updated: 11/26/22 1624     RBC, UA 10-20 /hpf      WBC, UA 30-50 /hpf      Epithelial Cells Moderate /hpf      Bacteria, UA Innumerable /hpf     Urine culture [[id number]] Collected: 11/26/22 1540    Lab Status: In process Specimen: Urine, Clean Catch Updated: 11/26/22 1624    UA w Reflex to Microscopic w Reflex to Culture [[id number]]  (Abnormal) Collected: 11/26/22 1540    Lab Status: Final result Specimen: Urine, Clean Catch Updated: 11/26/22 1610     Color, UA Daphne     Clarity, UA Cloudy     Specific Gravity, UA 1 025     pH, UA 5 5     Leukocytes, UA Moderate     Nitrite, UA Positive     Protein,  (2+) mg/dl      Glucose, UA Negative mg/dl      Ketones, UA Trace mg/dl      Urobilinogen, UA 4 0 E U /dl      Bilirubin, UA Large     Occult Blood, UA Small    FLU/RSV/COVID - if FLU/RSV clinically relevant [[id number]]  (Normal) Collected: 11/26/22 1453    Lab Status: Final result Specimen: Nares from Nose Updated: 11/26/22 1539     SARS-CoV-2 Negative     INFLUENZA A PCR Negative     INFLUENZA B PCR Negative     RSV PCR Negative    Narrative:      FOR PEDIATRIC PATIENTS - copy/paste COVID Guidelines URL to browser: [email or web address]    SARS-CoV-2 assay is a Nucleic Acid Amplification assay intended for the  qualitative detection of nucleic acid from SARS-CoV-2 in nasopharyngeal  swabs  Results are for the presumptive identification of SARS-CoV-2 RNA  Positive results are indicative of infection with SARS-CoV-2, the virus  causing COVID-19, but do not rule out bacterial infection or co-infection  with other viruses  Laboratories within the [place] and its  territories are required to report all positive results to the appropriate  public health authorities  Negative results do not preclude SARS-CoV-2  infection and should not be used as the sole basis for treatment or other  patient management decisions  Negative results must be combined with  clinical observations, patient history, and epidemiological information  This test has not been FDA cleared or approved  This test has been authorized by FDA under an Emergency Use Authorization  (EUA)  This test is only authorized for the duration of time the  declaration that circumstances exist justifying the authorization of the  emergency use of an in vitro diagnostic tests for detection of SARS-CoV-2  virus and/or diagnosis of COVID-19 infection under section 564(b)(1) of  the Act, 21 U  S C  835LLD-1(R)(7), unless the authorization is terminated  or revoked sooner  The test has been validated but independent review by FDA  and CLIA is pending  Test performed using [employer] GeneXpert: This RT-PCR assay targets N2,  a region unique to SARS-CoV-2  A conserved region in the E-gene was chosen  for pan-Sarbecovirus detection which includes SARS-CoV-2  According to CMS-2020-01-R, this platform meets the definition of high-throughput technology  Lactic acid [[id number]]  (Normal) Collected: 11/26/22 1453    Lab Status: Final result Specimen: Blood from Arm, Left Updated: 11/26/22 1522     LACTIC ACID 1 2 mmol/L     Narrative:      Result may be elevated if tourniquet was used during collection      Comprehensive metabolic panel [[id number]]  (Abnormal) Collected: 11/26/22 1453    Lab Status: Final result Specimen: Blood from Arm, Left Updated: 11/26/22 1518     Sodium 136 mmol/L      Potassium 3 2 mmol/L      Chloride 98 mmol/L      CO2 26 mmol/L      ANION GAP 12 mmol/L      BUN 12 mg/dL      Creatinine 1 06 mg/dL      Glucose 112 mg/dL      Calcium 9 3 mg/dL      Corrected Calcium 10 1 mg/dL      AST 50 U/L      ALT 62 U/L      Alkaline Phosphatase 292 U/L      Total Protein 8 0 g/dL      Albumin 3 0 g/dL      Total Bilirubin 1 77 mg/dL      eGFR 60 ml/min/1 73sq m     Narrative:      [place] guidelines for Chronic Kidney Disease (CKD):   •  Stage 1 with normal or high GFR (GFR > 90 mL/min/1 73 square meters)  •  Stage 2 Mild CKD (GFR = 60-89 mL/min/1 73 square meters)  •  Stage 3A Moderate CKD (GFR = 45-59 mL/min/1 73 square meters)  •  Stage 3B Moderate CKD (GFR = 30-44 mL/min/1 73 square meters)  •  Stage 4 Severe CKD (GFR = 15-29 mL/min/1 73 square meters)  •  Stage 5 End Stage CKD (GFR <15 mL/min/1 73 square meters)  Note: GFR calculation is accurate only with a steady state creatinine    CBC and differential [[id number]]  (Abnormal) Collected: 11/26/22 1453    Lab Status: Final result Specimen: Blood from Arm, Left Updated: 11/26/22 1503     WBC 11 89 Thousand/uL      RBC 4 18 Million/uL      Hemoglobin 13 1 g/dL      Hematocrit 39 1 %      MCV 94 fL      MCH 31 3 pg      MCHC 33 5 g/dL      RDW 12 0 %      MPV 10 2 fL      Platelets 623 Thousands/uL      nRBC 0 /100 WBCs      Neutrophils Relative 78 %      Immat GRANS % 1 %      Lymphocytes Relative 10 %      Monocytes Relative 11 %      Eosinophils Relative 0 %      Basophils Relative 0 %      Neutrophils Absolute 9 33 Thousands/µL      Immature Grans Absolute 0 06 Thousand/uL      Lymphocytes Absolute 1 17 Thousands/µL      Monocytes Absolute 1 28 Thousand/µL      Eosinophils Absolute 0 01 Thousand/µL      Basophils Absolute 0 04 Thousands/µL     Blood culture #1 [[id number]] Collected: 11/26/22 1445    Lab Status: In process Specimen: Blood from Arm, Left Updated: 11/26/22 1500    Blood culture #2 [[id number]] Collected: 11/26/22 1453    Lab Status:  In process Specimen: Blood from Hand, Left Updated: 11/26/22 1500                 CT renal stone study abdomen pelvis wo contrast   Final Result by Johnathan Duckworth Josie Castañeda MD (11/26 1516)      Moderate right-sided hydroureteronephrosis secondary to a 5 mm calculus in the distal right ureter  Mild right-sided perinephric and periureteral stranding  Workstation performed: [id number]                    Procedures  Procedures         ED Course  ED Course as of 11/26/22 2004   Sat Nov 26, 2022   1504 WBC(!): 11 89   1507 Temperature: 100 °F (37 8 °C)   1508 Pulse(!): 106   1524 Potassium(!): 3 2   1524 LACTIC ACID: 1 2   1535 CT renal study: Moderate right-sided hydroureteronephrosis secondary to a 5 mm calculus in the distal right ureter  Mild right-sided perinephric and periureteral stranding  1614 Nitrite, UA(!): Positive   1614 TT sent to urology   1822 Neurology recommends IV antibiotics, fluids, Flomax and transferred   [phone] Patient is agreeable to transfer  Requests [facility] as that is who we have oncall at this time  1707 Accepted at [facility]    1736 Temperature(!): 101 3 °F (38 5 °C)  Tylenol ordered    1902 Temperature(!): 102 7 °F (39 3 °C)  Urology TT me requesting transfer ASAP due to fever  Called [facility] who states urology requested patient updated to P1  Motrin and tylenol have been given   1908 Temperature: 100 2 °F (37 9 °C)   1948 Transport at bedside  Patient is stable                 SBIRT 20yo+    Flowsheet Row Most Recent Value   SBIRT (25 yo +)    In order to provide better care to our patients, we are screening all of our patients for alcohol and drug use  Would it be okay to ask you these screening questions? No Filed at: 11/26/2022 1534                    MDM  Number of Diagnoses or Management Options  Hydroureteronephrosis: new and requires workup  Ureteral stone: new and requires workup  UTI (urinary tract infection): new and requires workup  Diagnosis management comments: 26-year-old female presents to the emergency department for evaluation of fever and flank pain  Vitals and medical record review    Right-sided CVA tenderness on exam   Patient was with a low-grade fever on arrival   Minimally tachycardic  She had leukocytosis, no lactic acidosis  UA was significant for UTI  CT scan concerning for ureteral stone with hydroureteronephrosis  Urology recommended transfer  Patient requested transfer [facility]  [facility] was [facility] team for urologic procedure tomorrow morning  Patient did become febrile  Fever did respond to Tylenol and Motrin  Urology upgraded patient to prior T1 transfer  Patient was transferred [address] and/or Complexity of Data Reviewed  Clinical lab tests: ordered and reviewed  Tests in the radiology section of CPT®: ordered and reviewed  Review and summarize past medical records: yes  Discuss the patient with other providers: yes  Independent visualization of images, tracings, or specimens: yes        Disposition  Final diagnoses:   Ureteral stone   Hydroureteronephrosis   UTI (urinary tract infection)     Time reflects when diagnosis was documented in both MDM as applicable and the Disposition within this note     Time User Action Codes Description Comment    11/26/2022  4:42 PM Fawn Reyesrd Add [N20 1] Ureteral calculus     11/26/2022  5:37 PM Alon Branch Add [N20 1] Ureteral stone     11/26/2022  5:37 PM Alon Branch Add [N13 30] Hydroureteronephrosis     11/26/2022  5:37 PM Alon Branch Add [N39 0] UTI (urinary tract infection)       ED Disposition     ED Disposition   Transfer to Another Facility-In Network    Condition   --    Date/Time   Sat Nov 26, 2022  4:50 PM    Comment   Bruno Silva should be transferred out to [facility]             MD Documentation    Mina Byrd Most Recent Value   Patient Condition The patient has been stabilized such that within reasonable medical probability, no material deterioration of the patient condition or the condition of the unborn child(bailey) is likely to result from the transfer Reason for Transfer Other (Include comment)____________________  [urology]   Benefits of Transfer Specialized equipment and/or services available at the receiving facility (Include comment)________________________   Risks of Transfer Potential for delay in receiving treatment, Potential deterioration of medical condition, Loss of IV, Increased discomfort during transfer, Possible worsening of condition or death during transfer   Accepting Physician Dr Stephanie Beckman Name, Alric Arabian Sending MD Dr Danney Cooks, [facility] PA-C   Provider Certification General risk, such as traffic hazards, adverse weather conditions, rough terrain or turbulence, possible failure of equipment (including vehicle or aircraft), or consequences of actions of persons outside the control of the transport personnel, Unanticipated needs of medical equipment and personnel during transport, Risk of worsening condition, The possibility of a transport vehicle being unavailable      RN Documentation    Flowsheet Row Most [address] [facility] Name, [place] [address], [address] [place] [address]      Follow-up Information    None         Discharge Medication List as of 11/26/2022  8:02 PM      CONTINUE these medications which have NOT CHANGED    Details   amLODIPine (NORVASC) 5 mg tablet Take 5 mg by mouth daily, Starting Thu 5/13/2021, Historical Med      Ascorbic Acid, Vitamin C, (VITAMIN C) 100 MG tablet Take by mouth, Historical Med      Cholecalciferol 50 MCG (2000 UT) CAPS Take by mouth, Historical Med      cyclobenzaprine (FLEXERIL) 10 mg tablet Take 1 tablet (10 mg total) by mouth 3 (three) times a day as needed for muscle spasms, Starting Wed 6/30/2021, Normal      Diclofenac Sodium (VOLTAREN) 1 % Apply 2 g topically 4 (four) times a day, Starting Mon 3/14/2022, Normal      ibuprofen (MOTRIN) 200 mg tablet Take by mouth every 6 (six) hours as needed for mild pain, Historical Med      ketorolac (TORADOL) 10 mg tablet Take 1 tablet (10 mg total) by mouth every 6 (six) hours as needed for moderate pain, Starting Tue 3/22/2022, Normal      Multiple Vitamin (multivitamin) capsule Take 1 capsule by mouth daily, Historical Med      naproxen (NAPROSYN) 250 mg tablet Take 250 mg by mouth 2 (two) times a day with meals, Historical Med      ondansetron (ZOFRAN) 8 mg tablet Take 1 tablet (8 mg total) by mouth every 8 (eight) hours as needed for nausea or vomiting, Starting Mon 1/3/2022, Normal      Potassium Gluconate 2 5 MEQ TABS Take by mouth, Historical Med             No discharge procedures on file      PDMP Review     None          ED Provider  Electronically Signed by           [facility], PA-C  11/26/22 2004

## 2022-11-26 NOTE — DISCHARGE INSTRUCTIONS
CT renal stone study abdomen pelvis wo contrast   Final Result      Moderate right-sided hydroureteronephrosis secondary to a 5 mm calculus in the distal right ureter  Mild right-sided perinephric and periureteral stranding           Workstation performed: [id number]

## 2022-11-26 NOTE — ED NOTES
Patient transported to [facility] May [place] - [address], RN  11/26/22 4547

## 2022-11-26 NOTE — ED NOTES
Pt having rigors on bed, oral temp checked and found to be 101 3, YOVANNY barry notified and multiple blankets removed from pt        Lul Babin RN  11/26/22 3830

## 2022-11-26 NOTE — EMTALA/ACUTE CARE TRANSFER
[address]  [facility] [address]  Dept: [phone]      EMTALA TRANSFER CONSENT    NAME Cheryl Lovelace                                         1970                              MRN [id number]    I have been informed of my rights regarding examination, treatment, and transfer   by Dr Nadine Cowan MD    Benefits: Specialized equipment and/or services available at the receiving facility (Include comment)________________________    Risks: Potential for delay in receiving treatment, Potential deterioration of medical condition, Loss of IV, Increased discomfort during transfer, Possible worsening of condition or death during transfer      Consent for Transfer:  I acknowledge that my medical condition has been evaluated and explained to me by the emergency department physician or other qualified medical person and/or my attending physician, who has recommended that I be transferred to the service of  Accepting Physician: Dr Yanira Connolly at [address] [facility] [address], [address] : [facility]  The above potential benefits of such transfer, the potential risks associated with such transfer, and the probable risks of not being transferred have been explained to me, and I fully understand them  The doctor has explained that, in my case, the benefits of transfer outweigh the risks  I agree to be transferred  I authorize the performance of emergency medical procedures and treatments upon me in both transit and upon arrival at the receiving facility  Additionally, I authorize the release of any and all medical records to the receiving facility and request they be transported with me, if possible  I understand that the safest mode of transportation during a medical emergency is an ambulance and that the Hospital advocates the use of this mode of transport   Risks of traveling to the receiving facility by car, including absence of medical control, life sustaining equipment, such as oxygen, and medical personnel has been explained to me and I fully understand them  (JAIDEN CORRECT BOX BELOW)  [  ]  I consent to the stated transfer and to be transported by ambulance/helicopter  [  ]  I consent to the stated transfer, but refuse transportation by ambulance and accept full responsibility for my transportation by car  I understand the risks of non-ambulance transfers and I exonerate the Hospital and its staff from any deterioration in my condition that results from this refusal     X___________________________________________    DATE  22  TIME________  Signature of patient or legally responsible individual signing on patient behalf           RELATIONSHIP TO PATIENT_________________________          Provider Certification    NAME Bruno Silva                                        [facility] 1970                              MRN [id number]    A medical screening exam was performed on the above named patient  Based on the examination:    Condition Necessitating Transfer The encounter diagnosis was Ureteral calculus      Patient Condition: The patient has been stabilized such that within reasonable medical probability, no material deterioration of the patient condition or the condition of the unborn child(bailey) is likely to result from the transfer    Reason for Transfer: Other (Include comment)____________________ (urology)    Transfer Requirements: [address], [place]   · Space available and qualified personnel available for treatment as acknowledged by    · Agreed to accept transfer and to provide appropriate medical treatment as acknowledged by       Dr Rachael Aiken  · Appropriate medical records of the examination and treatment of the patient are provided at the time of transfer   [address] _______  · Transfer will be performed by qualified personnel from    and appropriate transfer equipment as required, including the use of necessary and appropriate life support measures  Provider Certification: I have examined the patient and explained the following risks and benefits of being transferred/refusing transfer to the patient/family:  General risk, such as traffic hazards, adverse weather conditions, rough terrain or turbulence, possible failure of equipment (including vehicle or aircraft), or consequences of actions of persons outside the control of the transport personnel, Unanticipated needs of medical equipment and personnel during transport, Risk of worsening condition, The possibility of a transport vehicle being unavailable      Based on these reasonable risks and benefits to the patient and/or the unborn child(bailey), and based upon the information available at the time of the patient’s examination, I certify that the medical benefits reasonably to be expected from the provision of appropriate medical treatments at another medical facility outweigh the increasing risks, if any, to the individual’s medical condition, and in the case of labor to the unborn child, from effecting the transfer      X____________________________________________ DATE 11/26/22        TIME_______      ORIGINAL - SEND TO MEDICAL RECORDS   COPY - SEND WITH PATIENT DURING TRANSFER

## 2022-11-27 VITALS
SYSTOLIC BLOOD PRESSURE: 117 MMHG | DIASTOLIC BLOOD PRESSURE: 79 MMHG | HEART RATE: 68 BPM | TEMPERATURE: 98 F | RESPIRATION RATE: 16 BRPM | OXYGEN SATURATION: 95 %

## 2022-11-27 PROBLEM — E87.6 HYPOKALEMIA: Status: RESOLVED | Noted: 2022-11-26 | Resolved: 2022-11-27

## 2022-11-27 LAB
ANION GAP SERPL CALCULATED.3IONS-SCNC: 6 MMOL/L (ref 4–13)
BASOPHILS # BLD MANUAL: 0 THOUSAND/UL (ref 0–0.1)
BASOPHILS NFR MAR MANUAL: 0 % (ref 0–1)
BUN SERPL-MCNC: 10 MG/DL (ref 5–25)
CALCIUM SERPL-MCNC: 9.5 MG/DL (ref 8.3–10.1)
CHLORIDE SERPL-SCNC: 112 MMOL/L (ref 96–108)
CO2 SERPL-SCNC: 24 MMOL/L (ref 21–32)
CREAT SERPL-MCNC: 0.68 MG/DL (ref 0.6–1.3)
EOSINOPHIL # BLD MANUAL: 0 THOUSAND/UL (ref 0–0.4)
EOSINOPHIL NFR BLD MANUAL: 0 % (ref 0–6)
ERYTHROCYTE [DISTWIDTH] IN BLOOD BY AUTOMATED COUNT: 12.1 % (ref 11.6–15.1)
GFR SERPL CREATININE-BSD FRML MDRD: 100 ML/MIN/1.73SQ M
GLUCOSE SERPL-MCNC: 128 MG/DL (ref 65–140)
HCT VFR BLD AUTO: 37.9 % (ref 34.8–46.1)
HGB BLD-MCNC: 12.7 G/DL (ref 11.5–15.4)
LYMPHOCYTES # BLD AUTO: 0.56 THOUSAND/UL (ref 0.6–4.47)
LYMPHOCYTES # BLD AUTO: 4 % (ref 14–44)
MCH RBC QN AUTO: 31.8 PG (ref 26.8–34.3)
MCHC RBC AUTO-ENTMCNC: 33.5 G/DL (ref 31.4–37.4)
MCV RBC AUTO: 95 FL (ref 82–98)
MONOCYTES # BLD AUTO: 0.42 THOUSAND/UL (ref 0–1.22)
MONOCYTES NFR BLD: 3 % (ref 4–12)
NEUTROPHILS # BLD MANUAL: 12.94 THOUSAND/UL (ref 1.85–7.62)
NEUTS BAND NFR BLD MANUAL: 12 % (ref 0–8)
NEUTS SEG NFR BLD AUTO: 81 % (ref 43–75)
NRBC BLD AUTO-RTO: 0 /100 WBCS
PLATELET # BLD AUTO: 249 THOUSANDS/UL (ref 149–390)
PLATELET BLD QL SMEAR: ADEQUATE
PMV BLD AUTO: 10.4 FL (ref 8.9–12.7)
POLYCHROMASIA BLD QL SMEAR: PRESENT
POTASSIUM SERPL-SCNC: 3.9 MMOL/L (ref 3.5–5.3)
RBC # BLD AUTO: 4 MILLION/UL (ref 3.81–5.12)
RBC MORPH BLD: PRESENT
SODIUM SERPL-SCNC: 142 MMOL/L (ref 135–147)
WBC # BLD AUTO: 13.91 THOUSAND/UL (ref 4.31–10.16)

## 2022-11-27 PROCEDURE — 0T768DZ DILATION OF RIGHT URETER WITH INTRALUMINAL DEVICE, VIA NATURAL OR ARTIFICIAL OPENING ENDOSCOPIC: ICD-10-PCS | Performed by: STUDENT IN AN ORGANIZED HEALTH CARE EDUCATION/TRAINING PROGRAM

## 2022-11-27 PROCEDURE — BT161ZZ FLUOROSCOPY OF RIGHT URETER USING LOW OSMOLAR CONTRAST: ICD-10-PCS | Performed by: STUDENT IN AN ORGANIZED HEALTH CARE EDUCATION/TRAINING PROGRAM

## 2022-11-27 DEVICE — INLAY OPTIMA URETERAL STENT W/O GUIDEWIRE
Type: IMPLANTABLE DEVICE | Status: FUNCTIONAL
Brand: BARD® INLAY OPTIMA® URETERAL STENT

## 2022-11-27 RX ORDER — ONDANSETRON 2 MG/ML
INJECTION INTRAMUSCULAR; INTRAVENOUS AS NEEDED
Status: DISCONTINUED | OUTPATIENT
Start: 2022-11-27 | End: 2022-11-27

## 2022-11-27 RX ORDER — SUCCINYLCHOLINE/SOD CL,ISO/PF 100 MG/5ML
SYRINGE (ML) INTRAVENOUS AS NEEDED
Status: DISCONTINUED | OUTPATIENT
Start: 2022-11-27 | End: 2022-11-27

## 2022-11-27 RX ORDER — ALBUTEROL SULFATE 2.5 MG/3ML
2.5 SOLUTION RESPIRATORY (INHALATION) ONCE AS NEEDED
Status: DISCONTINUED | OUTPATIENT
Start: 2022-11-27 | End: 2022-11-27 | Stop reason: HOSPADM

## 2022-11-27 RX ORDER — SODIUM CHLORIDE, SODIUM LACTATE, POTASSIUM CHLORIDE, CALCIUM CHLORIDE 600; 310; 30; 20 MG/100ML; MG/100ML; MG/100ML; MG/100ML
INJECTION, SOLUTION INTRAVENOUS CONTINUOUS PRN
Status: DISCONTINUED | OUTPATIENT
Start: 2022-11-27 | End: 2022-11-27

## 2022-11-27 RX ORDER — ONDANSETRON 2 MG/ML
4 INJECTION INTRAMUSCULAR; INTRAVENOUS ONCE AS NEEDED
Status: DISCONTINUED | OUTPATIENT
Start: 2022-11-27 | End: 2022-11-27 | Stop reason: HOSPADM

## 2022-11-27 RX ORDER — ROCURONIUM BROMIDE 10 MG/ML
INJECTION, SOLUTION INTRAVENOUS AS NEEDED
Status: DISCONTINUED | OUTPATIENT
Start: 2022-11-27 | End: 2022-11-27

## 2022-11-27 RX ORDER — DEXAMETHASONE SODIUM PHOSPHATE 10 MG/ML
INJECTION, SOLUTION INTRAMUSCULAR; INTRAVENOUS AS NEEDED
Status: DISCONTINUED | OUTPATIENT
Start: 2022-11-27 | End: 2022-11-27

## 2022-11-27 RX ORDER — FENTANYL CITRATE 50 UG/ML
INJECTION, SOLUTION INTRAMUSCULAR; INTRAVENOUS AS NEEDED
Status: DISCONTINUED | OUTPATIENT
Start: 2022-11-27 | End: 2022-11-27

## 2022-11-27 RX ORDER — FENTANYL CITRATE/PF 50 MCG/ML
25 SYRINGE (ML) INJECTION
Status: DISCONTINUED | OUTPATIENT
Start: 2022-11-27 | End: 2022-11-27 | Stop reason: HOSPADM

## 2022-11-27 RX ORDER — MIDAZOLAM HYDROCHLORIDE 2 MG/2ML
INJECTION, SOLUTION INTRAMUSCULAR; INTRAVENOUS AS NEEDED
Status: DISCONTINUED | OUTPATIENT
Start: 2022-11-27 | End: 2022-11-27

## 2022-11-27 RX ORDER — PROPOFOL 10 MG/ML
INJECTION, EMULSION INTRAVENOUS AS NEEDED
Status: DISCONTINUED | OUTPATIENT
Start: 2022-11-27 | End: 2022-11-27

## 2022-11-27 RX ORDER — CEPHALEXIN 500 MG/1
500 CAPSULE ORAL EVERY 8 HOURS SCHEDULED
Qty: 24 CAPSULE | Refills: 0 | Status: SHIPPED | OUTPATIENT
Start: 2022-11-27 | End: 2022-12-05

## 2022-11-27 RX ORDER — LIDOCAINE HYDROCHLORIDE 10 MG/ML
INJECTION, SOLUTION EPIDURAL; INFILTRATION; INTRACAUDAL; PERINEURAL AS NEEDED
Status: DISCONTINUED | OUTPATIENT
Start: 2022-11-27 | End: 2022-11-27

## 2022-11-27 RX ADMIN — PROPOFOL 150 MG: 10 INJECTION, EMULSION INTRAVENOUS at 00:27

## 2022-11-27 RX ADMIN — FENTANYL CITRATE 25 MCG: 50 INJECTION INTRAMUSCULAR; INTRAVENOUS at 01:00

## 2022-11-27 RX ADMIN — ACETAMINOPHEN 650 MG: 325 TABLET ORAL at 01:59

## 2022-11-27 RX ADMIN — DIPHENHYDRAMINE HCL 25 MG: 25 TABLET ORAL at 01:59

## 2022-11-27 RX ADMIN — FENTANYL CITRATE 50 MCG: 50 INJECTION INTRAMUSCULAR; INTRAVENOUS at 00:27

## 2022-11-27 RX ADMIN — LIDOCAINE HYDROCHLORIDE 50 MG: 10 INJECTION, SOLUTION EPIDURAL; INFILTRATION; INTRACAUDAL; PERINEURAL at 00:27

## 2022-11-27 RX ADMIN — DEXAMETHASONE SODIUM PHOSPHATE 10 MG: 10 INJECTION INTRAMUSCULAR; INTRAVENOUS at 00:27

## 2022-11-27 RX ADMIN — MIDAZOLAM 2 MG: 1 INJECTION INTRAMUSCULAR; INTRAVENOUS at 00:17

## 2022-11-27 RX ADMIN — SODIUM CHLORIDE, SODIUM ACETATE ANHYDROUS, SODIUM GLUCONATE, POTASSIUM CHLORIDE, AND MAGNESIUM CHLORIDE 125 ML/HR: 526; 222; 502; 37; 30 INJECTION, SOLUTION INTRAVENOUS at 01:12

## 2022-11-27 RX ADMIN — CEFTRIAXONE 1000 MG: 1 INJECTION, POWDER, FOR SOLUTION INTRAMUSCULAR; INTRAVENOUS at 08:31

## 2022-11-27 RX ADMIN — PROPOFOL 50 MG: 10 INJECTION, EMULSION INTRAVENOUS at 00:36

## 2022-11-27 RX ADMIN — ROCURONIUM BROMIDE 20 MG: 50 INJECTION INTRAVENOUS at 00:38

## 2022-11-27 RX ADMIN — SUGAMMADEX 150 MG: 100 INJECTION, SOLUTION INTRAVENOUS at 00:44

## 2022-11-27 RX ADMIN — SODIUM CHLORIDE, SODIUM LACTATE, POTASSIUM CHLORIDE, AND CALCIUM CHLORIDE: .6; .31; .03; .02 INJECTION, SOLUTION INTRAVENOUS at 00:19

## 2022-11-27 RX ADMIN — NICOTINE 1 PATCH: 21 PATCH, EXTENDED RELEASE TRANSDERMAL at 08:31

## 2022-11-27 RX ADMIN — FENTANYL CITRATE 25 MCG: 50 INJECTION INTRAMUSCULAR; INTRAVENOUS at 00:53

## 2022-11-27 RX ADMIN — SODIUM CHLORIDE, SODIUM ACETATE ANHYDROUS, SODIUM GLUCONATE, POTASSIUM CHLORIDE, AND MAGNESIUM CHLORIDE 125 ML/HR: 526; 222; 502; 37; 30 INJECTION, SOLUTION INTRAVENOUS at 08:33

## 2022-11-27 RX ADMIN — Medication 80 MG: at 00:27

## 2022-11-27 RX ADMIN — ONDANSETRON 4 MG: 2 INJECTION INTRAMUSCULAR; INTRAVENOUS at 00:44

## 2022-11-27 NOTE — ED NOTES
Attempted report to [phone] and was given two additional numbers, both of which were incorrect  Report given to [facility]  No questions        Allen Gregg RN  11/26/22 1955

## 2022-11-27 NOTE — CONSULTS
UROLOGY INPATIENT CONSULT NOTE   Name: Gonzalo Perera  : 1970  MRN: [id number]     Date of Service: 22  Service Requesting Consult: [facility]  Reason for consultation: Right ureteral stone      History of Present Illness:     Gonzalo Perera is a 46 y o  female who presented to the emergency room at [facility] from urgent care due to fevers over the past week  The patient notes over the past week she has had intermittent fevers and chills that are worse at night  She was not sure what this was from a started to get myalgias along with this so she presented to urgent care  Urgent care advised that she go to the emergency room  The emergency room she was found to have a 5 mm right ureteral stone, positive UA, leukocytosis to 11 9  She became febrile up to 102 9 and was emergently transported to [facility] for ureteral stent placement  She states that she saw a urologist who was supposed to treat her stone the summer but unfortunately due to staffing she was unable to get scheduled in a timely fashion  She has not had any pain so she has not address the before now      PAST MEDICAL HISTORY:     Past Medical History:   Diagnosis Date   • Fibromyalgia    • Hypertension    • Lupus ([address] ) 2017   • Thoracic outlet syndrome        PAST SURGICAL HISTORY:     Past Surgical History:   Procedure Laterality Date   • APPENDECTOMY     • ENDOMETRIAL ABLATION     • KNEE SURGERY     • THORACIC SYMPATHETECTOMY         CURRENT MEDICATIONS:     Current Facility-Administered Medications   Medication Dose Route Frequency Provider Last Rate Last Admin   • [MAR Hold] acetaminophen (TYLENOL) tablet 650 mg  650 mg Oral Q6H PRN Lizeth Chan MD       • [facility] Hold] calcium carbonate (TUMS) chewable tablet 1,000 mg  1,000 mg Oral Daily PRN Lizeth Chan MD       • [facility] Hold] cefTRIAXone (ROCEPHIN) 1,000 mg in dextrose 5 % 50 mL IVPB  1,000 mg Intravenous Q24H Lizeth Chan MD       • [facility] Hold] diphenhydrAMINE (BENADRYL) tablet 25 mg  25 mg Oral HS PRN Mayank Rios MD       • [facility] Hold] docusate sodium (COLACE) capsule 100 mg  100 mg Oral BID Mayank Rios MD   100 mg at 11/26/22 2053   • [MAR Hold] enoxaparin (LOVENOX) subcutaneous injection 40 mg  40 mg Subcutaneous Daily Mayank Rios MD       • [facility] Hold] HYDROmorphone (DILAUDID) injection 1 mg  1 mg Intravenous Q4H PRN Mayank Rios MD       • multi-electrolyte (PLASMALYTE-A/ISOLYTE-S PH 7 4) IV solution  125 mL/hr Intravenous Continuous Mayank Rios  mL/hr at 11/26/22 2054 125 mL/hr at 11/26/22 2054   • [MAR Hold] nicotine (NICODERM CQ) 21 mg/24 hr TD 24 hr patch 1 patch  1 patch Transdermal Daily Mayank Rios MD       • [MAR Hold] ondansetron (ZOFRAN) injection 4 mg  4 mg Intravenous Q6H PRN Mayank Rios MD       • [facility] Hold] oxyCODONE (ROXICODONE) immediate release tablet 10 mg  10 mg Oral Q4H PRN Mayank Rios MD       • [MAR Hold] oxyCODONE (ROXICODONE) IR tablet 5 mg  5 mg Oral Q4H PRN Mayank Rios MD       • [MAR Hold] senna (SENOKOT) tablet 17 2 mg  2 tablet Oral Daily PRN Mayank Rios MD       • sodium chloride 0 9 % irrigation solution    PRN Nelson Alexis MD   3,000 mL at 11/26/22 2339       ALLERGIES:   No Known Allergies    SOCIAL HISTORY:     Social History     Socioeconomic History   • Marital status: /Civil Union     Spouse name: Not on file   • Number of children: Not on file   • Years of education: Not on file   • Highest education level: Not on file   Occupational History   • Not on file   Tobacco Use   • Smoking status: Every Day     Packs/day: 0 50     Types: Cigarettes   • Smokeless tobacco: Never   Vaping Use   • Vaping Use: Never used   Substance and Sexual Activity   • Alcohol use: Yes     Comment: occassionly    • Drug use: Yes     Types: Marijuana     Comment: medical   • Sexual activity: Not on file   Other Topics Concern   • Not on file   Social History Narrative   • Not on file     Social Determinants of Health     Financial Resource Strain: Not on file   Food Insecurity: Not on file   Transportation Needs: Not on file   Physical Activity: Not on file   Stress: Not on file   Social Connections: Not on file   Intimate Partner Violence: Not on file   Housing Stability: Not on file       FAMILY HISTORY:     Family History   Problem Relation Age of Onset   • Osteoporosis Mother    • Cancer Father        REVIEW OF SYSTEMS:     Review of Systems   Constitutional: Positive for chills and fever  Negative for unexpected weight change  HENT: Negative for hearing loss and sore throat  Eyes: Negative for redness and visual disturbance  Respiratory: Negative for cough and shortness of breath  Cardiovascular: Negative for chest pain, palpitations and leg swelling  Gastrointestinal: Negative for blood in stool, constipation, diarrhea, nausea and vomiting  Endocrine: Negative for cold intolerance and heat intolerance  Genitourinary:        Per HPI   Musculoskeletal: Negative for arthralgias, back pain and myalgias  Skin: Negative for color change and rash  Neurological: Negative for dizziness, seizures and headaches  Hematological: Does not bruise/bleed easily  PHYSICAL EXAM:     /100   Pulse 95   Temp 98 9 °F (37 2 °C)   SpO2 96%     General:  Healthy appearing female in no acute distress  Head:  Normocephalic, atraumatic  Eyes: no scleral icterus  ENMT: Nares patent, moist mucous membranes  Cardiovascular:  Regular rate  Respiratory:  Patient has unlabored respirations     Abdomen:  Abdomen nondistended  Neurological: Grossly intact  Psych: Normal affect    LABS:     CBC:   Lab Results   Component Value Date    WBC 11 89 (H) 11/26/2022    HGB 13 1 11/26/2022    HCT 39 1 11/26/2022    MCV 94 11/26/2022     11/26/2022       BMP:   Lab Results   Component Value Date    CALCIUM 9 3 11/26/2022    K 3 2 (L) 11/26/2022    CO2 26 11/26/2022    CL 98 11/26/2022    BUN 12 11/26/2022    CREATININE 1 06 11/26/2022       IMAGING:     CT ABDOMEN AND PELVIS WITHOUT IV CONTRAST - LOW DOSE RENAL STONE      INDICATION:   Flank pain, kidney stone suspected  RT FLANK PAIN      COMPARISON:  None      TECHNIQUE:  Low radiation dose thin section CT examination of the abdomen and pelvis was performed without intravenous or oral contrast according to a protocol specifically designed to evaluate for urinary tract calculus  Axial, sagittal, and coronal 2D   reformatted images were created from the source data and submitted for interpretation  Evaluation for pathology in the abdomen and pelvis that is unrelated to urinary tract calculi is limited        Radiation dose length product (DLP) for this visit:  309 mGy-cm   This examination, like all CT scans performed in the [facility], was performed utilizing techniques to minimize radiation dose exposure, including the use of iterative   reconstruction and automated exposure control      URINARY TRACT FINDINGS:     RIGHT KIDNEY AND URETER:  There is moderate right-sided hydroureteronephrosis secondary to a 5 mm calculus in the distal right ureter  There is mild right-sided perinephric and periureteral stranding      LEFT KIDNEY AND URETER:  No urinary tract calculi  No hydronephrosis or hydroureter      URINARY BLADDER:  Unremarkable         ADDITIONAL FINDINGS:     LOWER CHEST:  No clinically significant abnormality identified in the visualized lower chest      SOLID VISCERA: Limited low radiation dose noncontrast CT evaluation demonstrates no clinically significant abnormality of the imaged portions of the liver, spleen, pancreas, or adrenal glands        GALLBLADDER/BILIARY TREE:  No calcified gallstones  No pericholecystic inflammatory change  No biliary dilatation      STOMACH AND BOWEL:  No bowel obstruction or focal inflammatory process    A few scattered colonic diverticula without acute diverticulitis      APPENDIX:  No findings to suggest appendicitis      ABDOMINOPELVIC CAVITY:  No ascites  No pneumoperitoneum  No lymphadenopathy  The abdominal aorta is calcified  No retroperitoneal hematoma      REPRODUCTIVE ORGANS:  Unremarkable for patient's age      ABDOMINAL WALL/INGUINAL REGIONS:  Tiny fat-containing periumbilical hernia      OSSEOUS STRUCTURES:  No acute fracture or destructive osseous lesion      IMPRESSION:     Moderate right-sided hydroureteronephrosis secondary to a 5 mm calculus in the distal right ureter  Mild right-sided perinephric and periureteral stranding  ASSESSMENT:     46 y o  female with right ureteral stone, positive UA, and fever  Right ureteral stent placement advised  I explained to her that a ureteral stent is temporary and that the stone will not be treated today  I explained that treating a stone is contraindicated in this setting of infection  It is possible that the stone will pass alongside the stent but this is not a guarantee  She expressed understanding of the procedure and like to proceed  PLAN:     To OR for cystoscopy, right retrograde pyelogram, right ureteral stent placement  Received Rocephin in the emergency room at 5:00 p m   Which is adequate coverage for procedure    Nigel Lantgiua MD  12:16 AM  [facility]

## 2022-11-27 NOTE — H&P
[address] [facility]  H&P- Delfina Ram 1970, 46 y o  female MRN: [id number]  Unit/Bed#: -01 Encounter: [id number]  Primary Care Provider: Sandy Gross MD   Date and time admitted to hospital: 11/26/2022  8:24 PM    * Hydroureteronephrosis  Assessment & Plan  · Presented to the Ed with fever and flank pain, found to have 5mm ureteral stone with hydro and UTI  · Transferred to [facility] for a cystoscopy due to a lack of urology services at the transferring facility  · Continue IV ceftriaxone  · IV hydration  · Await cultures  · Analgesics and antiemetics PRN  · Consult urology for a cystoscopy  UTI (urinary tract infection)  Assessment & Plan  Management as above    Hypokalemia  Assessment & Plan  · Oral supplementation  · BMP in AM    Centrilobular emphysema (HCC)  Assessment & Plan  · Respiratory status is stable at this time  Hypertension  Assessment & Plan  · Continue losartan if the AM if renal function is stable    Tobacco abuse  Assessment & Plan  · Nicotine patch  · Cessation counseling when stable    Fibromyalgia  Assessment & Plan  · Uses ibuprofen and medical marijuana    VTE Pharmacologic Prophylaxis:   Moderate Risk (Score 3-4) - Pharmacological DVT Prophylaxis Ordered: enoxaparin (Lovenox)  Code Status: Level 1 - Full Code full  Discussion with family: Patient declined call to   Anticipated Length of Stay: Patient will be admitted on an inpatient basis with an anticipated length of stay of greater than 2 midnights secondary to uti, ureteral stone  Total Time for Visit, including Counseling / Coordination of Care: 45 minutes Greater than 50% of this total time spent on direct patient counseling and coordination of care  Chief Complaint: flank pain, fever    History of Present Illness:  Delfina Ram is a 46 y o  female with a PMH of HTN, emphysema who presents with flank pain and fever   She initially went to urgent care who referred here to the ED at [address] [place] [address]  There she was found to have a 5mm ureteral stone causing hydronephrosis and a fever of 102 7  Due to a lack of urology services she was transferred to [facility] for an urgent cystoscopy  Review of Systems:  Review of Systems   All other systems reviewed and are negative  Past Medical and Surgical History:   Past Medical History:   Diagnosis Date   • Fibromyalgia    • Hypertension    • Lupus ([address] ) 2017   • Thoracic outlet syndrome        Past Surgical History:   Procedure Laterality Date   • APPENDECTOMY     • ENDOMETRIAL ABLATION     • KNEE SURGERY     • THORACIC SYMPATHETECTOMY         Meds/Allergies:  Prior to Admission medications    Medication Sig Start Date End Date Taking?  Authorizing Provider   amLODIPine (NORVASC) 5 mg tablet Take 5 mg by mouth daily 5/13/21   Historical Provider, MD   Ascorbic Acid, Vitamin C, (VITAMIN C) 100 MG tablet Take by mouth    Historical Provider, MD   Cholecalciferol 50 MCG (2000 UT) CAPS Take by mouth    Historical Provider, MD   cyclobenzaprine (FLEXERIL) 10 mg tablet Take 1 tablet (10 mg total) by mouth 3 (three) times a day as needed for muscle spasms  Patient not taking: Reported on 1/3/2022  6/30/21   Adam Harper MD   Diclofenac Sodium (VOLTAREN) 1 % Apply 2 g topically 4 (four) times a day  Patient not taking: Reported on 3/22/2022 3/14/22   Addi Matute MD   ibuprofen (MOTRIN) 200 mg tablet Take by mouth every 6 (six) hours as needed for mild pain    Historical Provider, MD   ketorolac (TORADOL) 10 mg tablet Take 1 tablet (10 mg total) by mouth every 6 (six) hours as needed for moderate pain 3/22/22   MARGOTH Zimmerman   losartan (COZAAR) 100 MG tablet Take 100 mg by mouth daily 9/20/22   Historical Provider, MD   Multiple Vitamin (multivitamin) capsule Take 1 capsule by mouth daily    Historical Provider, MD   naproxen (NAPROSYN) 250 mg tablet Take 250 mg by mouth 2 (two) times a day with meals    Historical Provider, MD ondansetron (ZOFRAN) 8 mg tablet Take 1 tablet (8 mg total) by mouth every 8 (eight) hours as needed for nausea or vomiting  Patient not taking: Reported on 2/17/2022  1/3/22   Beth Beebe MD   Potassium Gluconate 2 5 MEQ TABS Take by mouth    Historical Provider, MD     I have reviewed home medications with patient personally  Allergies: No Known Allergies    Social History:  Marital Status: /Civil Union   Occupation: [employer]  Patient Pre-hospital Living Situation: Home  Patient Pre-hospital Level of Mobility: walks  Patient Pre-hospital Diet Restrictions: None  Substance Use History:   Social History     Substance and Sexual Activity   Alcohol Use Yes    Comment: occassionly      Social History     Tobacco Use   Smoking Status Every Day   • Packs/day: 0 50   • Types: Cigarettes   Smokeless Tobacco Never     Social History     Substance and Sexual Activity   Drug Use Yes   • Types: Marijuana    Comment: medical       Family History:  Family History   Problem Relation Age of Onset   • Osteoporosis Mother    • Cancer Father        Physical Exam:     Vitals:   Blood Pressure: 149/100 (11/26/22 2034)  Pulse: 95 (11/26/22 2034)  Temperature: 98 9 °F (37 2 °C) (11/26/22 2034)  SpO2: 96 % (11/26/22 2034)    Physical Exam  Constitutional:       Appearance: Normal appearance  HENT:      Head: Normocephalic and atraumatic  Nose: Nose normal    Eyes:      Extraocular Movements: Extraocular movements intact  Cardiovascular:      Rate and Rhythm: Normal rate and regular rhythm  Pulmonary:      Effort: Pulmonary effort is normal       Breath sounds: No wheezing or rales  Abdominal:      Palpations: Abdomen is soft  Tenderness: There is right CVA tenderness  Skin:     General: Skin is warm and dry  Neurological:      General: No focal deficit present  Mental Status: She is alert and oriented to person, place, and time     Psychiatric:         Mood and Affect: Mood normal  Behavior: Behavior normal           Additional Data:     Lab Results:  Results from last 7 days   Lab Units 11/26/22  1453   WBC Thousand/uL 11 89*   HEMOGLOBIN g/dL 13 1   HEMATOCRIT % 39 1   PLATELETS Thousands/uL 239   NEUTROS PCT % 78*   LYMPHS PCT % 10*   MONOS PCT % 11   EOS PCT % 0     Results from last 7 days   Lab Units 11/26/22  1453   SODIUM mmol/L 136   POTASSIUM mmol/L 3 2*   CHLORIDE mmol/L 98   CO2 mmol/L 26   BUN mg/dL 12   CREATININE mg/dL 1 06   ANION GAP mmol/L 12   CALCIUM mg/dL 9 3   ALBUMIN g/dL 3 0*   TOTAL BILIRUBIN mg/dL 1 77*   ALK PHOS U/L 292*   ALT U/L 62   AST U/L 50*   GLUCOSE RANDOM mg/dL 112                 Results from last 7 days   Lab Units 11/26/22  1453   LACTIC ACID mmol/L 1 2       Lines/Drains:  Invasive Devices     Peripheral Intravenous Line  Duration           Peripheral IV 11/26/22 Left Antecubital <1 day                    Imaging: Reviewed radiology reports from this admission including: abdominal/pelvic CT  No orders to display       EKG and Other Studies Reviewed on Admission:   · EKG: No EKG obtained  ** Please Note: This note has been constructed using a voice recognition system   **

## 2022-11-27 NOTE — ANESTHESIA POSTPROCEDURE EVALUATION
Post-Op Assessment Note    CV Status:  Stable  Pain Score: 0    Pain management: adequate     Mental Status:  Awake   Hydration Status:  Stable   PONV Controlled:  None   Airway Patency:  Patent      Post Op Vitals Reviewed: Yes      Staff: CRNA         No notable events documented      BP   153/80   Temp 97 7F   Pulse 95   Resp 20   SpO2 98% 6L FM

## 2022-11-27 NOTE — DISCHARGE SUMMARY
[facility] [facility]  Discharge- Lugene Bryn 1970, 46 y o  female MRN: [id number]  Unit/Bed#: -01 Encounter: [id number]  Primary Care Provider: Angie Michelle MD   Date and time admitted to hospital: 11/26/2022  8:24 PM    * Hydroureteronephrosis  Assessment & Plan  · Presented to the Ed with fever and flank pain, found to have 5mm ureteral stone with hydro and UTI  · Transferred to [facility] for a cystoscopy due to a lack of urology services at the transferring facility  · Continue IV ceftriaxone,  Will need a total of 10 days of antibiotics, received 2 days of IV ceftriaxone, discharged with Keflex 500 mg Q 8 for additional 8 days  Follow-up with Urology  Follow up with culture sensitivities  · IV hydration  · Await cultures  · Analgesics and antiemetics PRN  · Consult urology for a cystoscopy  UTI (urinary tract infection)  Assessment & Plan  Management as above,  Will need a total of 10 days of antibiotics, received 2 days of IV ceftriaxone, discharged with Keflex 500 mg Q 8 for additional 8 days  Follow-up with Urology  Follow up with culture sensitivities      Tobacco abuse  Assessment & Plan  · Nicotine patch  · Cessation counseling when stable    Hypertension  Assessment & Plan  · Continue losartan if the AM if renal function is stable    Fibromyalgia  Assessment & Plan  · Uses ibuprofen and medical marijuana    Centrilobular emphysema (HCC)  Assessment & Plan  · Respiratory status is stable at this time,   No acute exacerbation      Medical Problems     Resolved Problems  Date Reviewed: 11/27/2022          Resolved    Hypokalemia 11/27/2022     Resolved by  Noah Barber DO        Discharging Physician / Practitioner: Noah Barber DO  PCP: Angie Michelle MD  Admission Date:   Admission Orders (From admission, onward)     Ordered        11/26/22 2030  Inpatient Admission  Once            11/26/22 2029  Inpatient Admission  Once Discharge Date: 11/27/22    Consultations During Hospital Stay:  ·   Urology    Procedures Performed:   Cystoscopy  Right Retrograde Pyelogram   Right ureteral stent placement (6Fr x 22 cm JJ stent)  Fluoroscopic Guidance     Significant Findings / Test Results:   ·  CT abdomen: Moderate right-sided hydroureteronephrosis secondary to a 5 mm calculus in the distal right ureter  Mild right-sided perinephric and periureteral stranding  Incidental Findings:   ·  none      Test Results Pending at Discharge (will require follow up):   ·  urine culture     Outpatient Tests Requested:  ·  none    Complications:   none    Reason for Admission:  Fever  Hospital Course:   Tiana Lambert is a 46 y o  female patient who originally presented to the hospital on 11/26/2022 due to   Flank pain and fever  Was found to have 5 mm calculus in the distal right ureter with right-sided perinephric and periureteral stranding and UA consistent with UTI  She was transferred from she is seldom bedtime for urology consult  She was taken to the OR overnight for cystoscopy and pyelogram and right ureteral stent placement  Later during the day she was feeling well and not having any more pain or UTI symptoms  She has not had any fever postop  Urology wanted her to wait until cultures and sensitivities were back but she is feeling well enough for discharge will like to follow up with that outpatient  She will be discharged on 8 additional days of Keflex for a total of 10 days of treatment  She is to call and establish with urology outpatient in the next 1 to 2 weeks  The patient, initially admitted to the hospital as inpatient, was discharged earlier than expected given the following: Rapid improvement after stent placement and 2 days of IV antibiotics  Please see above list of diagnoses and related plan for additional information       Condition at Discharge: fair    Discharge Day Visit / Exam:   Subjective: Patient seen examined  She reports her urine is clear today  She reports that she is not having any pain in her flank  She would like to be discharged today  Vitals: Blood Pressure: 117/79 (11/27/22 0749)  Pulse: 68 (11/27/22 0506)  Temperature: 98 °F (36 7 °C) (11/27/22 0749)  Respirations: 16 (11/27/22 0115)  SpO2: 95 % (11/27/22 0506)  Exam:   Physical Exam  Vitals reviewed  Constitutional:       Appearance: Normal appearance  She is well-developed and well-nourished  HENT:      Mouth/Throat:      Mouth: Oropharynx is clear and moist  Mucous membranes are moist    Eyes:      Conjunctiva/sclera: Conjunctivae normal    Cardiovascular:      Rate and Rhythm: Normal rate and regular rhythm  Heart sounds: Normal heart sounds  No murmur heard  Pulmonary:      Effort: Pulmonary effort is normal  No respiratory distress  Breath sounds: Normal breath sounds  No wheezing  Abdominal:      General: Bowel sounds are normal  There is no distension  Palpations: Abdomen is soft  Tenderness: There is no abdominal tenderness  Musculoskeletal:      Cervical back: Neck supple  Skin:     General: Skin is warm and dry  Neurological:      General: No focal deficit present  Mental Status: She is alert and oriented to person, place, and time  Psychiatric:         Mood and Affect: Mood and affect and mood normal          Behavior: Behavior normal           Discussion with Family: Patient declined call to   Discharge instructions/Information to patient and family:   See after visit summary for information provided to patient and family  Provisions for Follow-Up Care:  See after visit summary for information related to follow-up care and any pertinent home health orders  Disposition:   Home    Planned Readmission:   No     Discharge Statement:  I spent 30 minutes discharging the patient  This time was spent on the day of discharge   I had direct contact with the patient on the day of discharge  Greater than 50% of the total time was spent examining patient, answering all patient questions, arranging and discussing plan of care with patient as well as directly providing post-discharge instructions  Additional time then spent on discharge activities  Discharge Medications:  See after visit summary for reconciled discharge medications provided to patient and/or family        **Please Note: This note may have been constructed using a voice recognition system**

## 2022-11-27 NOTE — ANESTHESIA PREPROCEDURE EVALUATION
Procedure:  CYSTOSCOPY RETROGRADE PYELOGRAM WITH INSERTION STENT URETERAL (Right: Bladder)     - denies any chest pain, palpitations, shortness of breath, syncope, lightheadedness, seizures   - denies any recent infectious symptoms such as fevers, chills, cough   - denies taking any anticoagulation medications or any issues with bleeding, bruising, clotting      Relevant Problems   CARDIO   (+) Hypertension      /RENAL   (+) Hydroureteronephrosis      MUSCULOSKELETAL   (+) Fibromyalgia      NEURO/PSYCH   (+) Fibromyalgia      PULMONARY   (+) Centrilobular emphysema (HCC)      Lab Results   Component Value Date    WBC 11 89 (H) 11/26/2022    HGB 13 1 11/26/2022    HCT 39 1 11/26/2022    MCV 94 11/26/2022     11/26/2022     Lab Results   Component Value Date    SODIUM 136 11/26/2022    K 3 2 (L) 11/26/2022    CL 98 11/26/2022    CO2 26 11/26/2022    AGAP 12 11/26/2022    BUN 12 11/26/2022    CREATININE 1 06 11/26/2022    GLUC 112 11/26/2022    CALCIUM 9 3 11/26/2022    AST 50 (H) 11/26/2022    ALT 62 11/26/2022    ALKPHOS 292 (H) 11/26/2022    TP 8 0 11/26/2022    TBILI 1 77 (H) 11/26/2022    EGFR 60 11/26/2022     No results found for: PTT  No results found for: INR, PROTIME    Physical Exam    Airway    Mallampati score: II  TM Distance: >3 FB  Neck ROM: full     Dental   No notable dental hx upper dentures,     Cardiovascular  Rhythm: regular, Rate: normal, Cardiovascular exam normal    Pulmonary  Pulmonary exam normal     Other Findings        Anesthesia Plan  ASA Score- 2 Emergent    Anesthesia Type- general with ASA Monitors  Additional Monitors:   Airway Plan: ETT  Plan Factors-Exercise tolerance (METS): >4 METS  Chart reviewed  EKG reviewed  Imaging results reviewed  Existing labs reviewed  Patient summary reviewed  Patient is not a current smoker  Patient did not smoke on day of surgery  Obstructive sleep apnea risk education given perioperatively      Induction- intravenous and rapid sequence induction  Postoperative Plan- Plan for postoperative opioid use  Informed Consent- Anesthetic plan and risks discussed with patient  I personally reviewed this patient with the CRNA  Discussed and agreed on the Anesthesia Plan with the CRNA  Dyan Fernandez no

## 2022-11-27 NOTE — ASSESSMENT & PLAN NOTE
Management as above,  Will need a total of 10 days of antibiotics, received 2 days of IV ceftriaxone, discharged with Keflex 500 mg Q 8 for additional 8 days  Follow-up with Urology  Follow up with culture sensitivities

## 2022-11-27 NOTE — ASSESSMENT & PLAN NOTE
· Presented to the Ed with fever and flank pain, found to have 5mm ureteral stone with hydro and UTI  · Transferred to [facility] for a cystoscopy due to a lack of urology services at the transferring facility  · Continue IV ceftriaxone,  Will need a total of 10 days of antibiotics, received 2 days of IV ceftriaxone, discharged with Keflex 500 mg Q 8 for additional 8 days  Follow-up with Urology  Follow up with culture sensitivities  · IV hydration  · Await cultures  · Analgesics and antiemetics PRN  · Consult urology for a cystoscopy

## 2022-11-27 NOTE — UTILIZATION REVIEW
Initial Clinical Review    Admission: Date/Time/Statement:   Admission Orders (From admission, onward)     Ordered        11/26/22 2030  Inpatient Admission  Once            11/26/22 2029  Inpatient Admission  Once                      Orders Placed This Encounter   Procedures   • Inpatient Admission     Standing Status:   Standing     Number of Occurrences:   1     Order Specific Question:   Level of Care     Answer:   Med Surg [16]     Order Specific Question:   Estimated length of stay     Answer:   More than 2 Midnights     Order Specific Question:   Certification     Answer:   I certify that inpatient services are medically necessary for this patient for a duration of greater than two midnights  See H&P and MD Progress Notes for additional information about the patient's course of treatment  • Inpatient Admission     Standing Status:   Standing     Number of Occurrences:   1     Order Specific Question:   Level of Care     Answer:   Med Surg [16]     Order Specific Question:   Estimated length of stay     Answer:   More than 2 Midnights     Order Specific Question:   Certification     Answer:   I certify that inpatient services are medically necessary for this patient for a duration of greater than two midnights  See H&P and MD Progress Notes for additional information about the patient's course of treatment  Initial Presentation: 46 y o  female  [facility] [address] [place] [place] [address]  [facility]   for evaluation of fevers  (over past week)  flank pain  PMH kidney stones  States she was scheduled to have a surgical intervention on a kidney stone in June however fif etelvina FUP,  Pain resolved  IN ER,   98 9   95   24   149/100     Ranks rt flank pain as 6/10  w/8/10 overall body aches  Reports decreased appetite  Nausea this morning      mild/low-grade temperature and positive bacteria and innumerable white cells on microscopic urinalysis  She has mild leukocytosis and normal renal function        SEND To [facility] for higher level of care/  Urology services available over weekend  Admit IP status to Internal Med Service, MS level of care,  [facility] for  Management of  obstructing ureteral calculus w/renal colic  Will provide pain/nausea control  Initiate infectious/urine workup  Consult Urology  keep NPO after mn, cont aggressive  IVF, IVAB therapy  Cont flomax and straining urine  FUP urine/blood cultures  Monitor/replete lytes prn  (given KDur po 40 meq x1)  Scheduled  at [facility] for  11/27  AM - cystoscopy, retrograde pyelography and right ureteral stent insertion      Date:   11/27      Day 2:   OP NOTE:  Cystoscopy  Right Retrograde Pyelogram    Right ureteral stent placement (6Fr x 22 cm JJ stent)   Fluoroscopic Guidance     11/27  Postop -  Later during the day she was feeling well and not having any more pain or UTI symptoms  She has not had any fever postop  Urology wanted her to wait until cultures and sensitivities were back but she is feeling well enough for discharge and agreed to fup outpatient  She will be discharged on 8 additional days of Keflex for a total of 10 days of treatment  She is to call and establish with urology outpatient in the next 1 to 2 weeks           ED Triage Vitals   Temperature Pulse Respirations Blood Pressure SpO2   11/26/22 2034 11/26/22 2034 11/27/22 0100 11/26/22 2034 11/26/22 2034   98 9 °F (37 2 °C) 95 (!) 24 149/100 96 %      Temp src Heart Rate Source Patient Position - Orthostatic VS BP Location FiO2 (%)   -- -- -- -- --             Pain Score       11/26/22 2044       No Pain          Wt Readings from Last 1 Encounters:   11/26/22 65 8 kg (145 lb)     Additional Vital Signs:   11/27/22 07:49:15 98 °F (36 7 °C) -- -- 117/79 92 -- -- --   11/27/22 05:06:25 98 °F (36 7 °C) 68 -- -- -- 95 % -- --   11/27/22 01:37:33 98 7 °F (37 1 °C) 92 -- 142/85 104 95 % -- --   11/27/22 0115 -- 90 16 137/85 102 95 % 2 L/min Nasal cannula 11/27/22 0100 97 7 °F (36 5 °C) 97 24 Abnormal   153/80 119 96 % 6 L/min Simple mask       Pertinent Labs/Diagnostic Test Results:   FL retrograde pyelogram    (Results Pending)     Results from last 7 days   Lab Units 11/26/22  1453   SARS-COV-2  Negative     Results from last 7 days   Lab Units 11/27/22  0513 11/26/22  1453   WBC Thousand/uL 13 91* 11 89*   HEMOGLOBIN g/dL 12 7 13 1   HEMATOCRIT % 37 9 39 1   PLATELETS Thousands/uL 249 239   NEUTROS ABS Thousands/µL  --  9 33*   BANDS PCT % 12*  --          Results from last 7 days   Lab Units 11/27/22  0513 11/26/22  1453   SODIUM mmol/L 142 136   POTASSIUM mmol/L 3 9 3 2*   CHLORIDE mmol/L 112* 98   CO2 mmol/L 24 26   ANION GAP mmol/L 6 12   BUN mg/dL 10 12   CREATININE mg/dL 0 68 1 06   EGFR ml/min/1 73sq m 100 60   CALCIUM mg/dL 9 5 9 3     Results from last 7 days   Lab Units 11/26/22  1453   AST U/L 50*   ALT U/L 62   ALK PHOS U/L 292*   TOTAL PROTEIN g/dL 8 0   ALBUMIN g/dL 3 0*   TOTAL BILIRUBIN mg/dL 1 77*         Results from last 7 days   Lab Units 11/27/22  0513 11/26/22  1453   GLUCOSE RANDOM mg/dL 128 112     Results from last 7 days   Lab Units 11/26/22  1453   LACTIC ACID mmol/L 1 2     Results from last 7 days   Lab Units 11/26/22  1540 11/26/22  1305   CLARITY UA  Cloudy cloudy   COLOR UA  Natanael dark natanael   SPEC GRAV UA  1 025  --    PH UA  5 5  --    GLUCOSE UA mg/dl Negative neg   KETONES UA mg/dl Trace* Neg   BLOOD UA  Small* Large+++   PROTEIN UA mg/dl 100 (2+)* 300+++   NITRITE UA  Positive* Neg   BILIRUBIN UA  Large*  --    BILIRUBIN UA POC   --  Lg/+++   UROBILINOGEN UA E U /dl 4 0* 8   LEUKOCYTES UA  Moderate* Mod++   WBC UA /hpf 30-50*  --    RBC UA /hpf 10-20*  --    BACTERIA UA /hpf Innumerable*  --    EPITHELIAL CELLS WET PREP /hpf Moderate*  --      Results from last 7 days   Lab Units 11/26/22  1453   INFLUENZA A PCR  Negative   INFLUENZA B PCR  Negative   RSV PCR  Negative     Results from last 7 days   Lab Units 11/26/22  1458 11/26/22  1445   BLOOD CULTURE  Received in Microbiology Lab  Culture in Progress  Received in Microbiology Lab  Culture in Progress  Past Medical History:   Diagnosis Date   • Fibromyalgia    • Hypertension    • Lupus ([facility] [address] ) 2017   • Thoracic outlet syndrome      Present on Admission:  • [facility]  • Fibromyalgia  • Hypertension  • Tobacco abuse  • UTI (urinary tract infection)  • Hydroureteronephrosis  • (Resolved) Hypokalemia      Admitting Diagnosis: Ureteral calculus [N20 1]  UTI (urinary tract infection) [N39 0]  Age/Sex: 46 y o  female  Admission Orders:    SEE ABOVE NOTE    Scheduled Medications:  cefTRIAXone, 1,000 mg, Intravenous, Q24H   (GIVEN X1  ON 11/26 AND X1 ON 1/27)   docusate sodium, 100 mg, Oral, BID  enoxaparin, 40 mg, Subcutaneous, Daily  nicotine, 1 patch, Transdermal, Daily      Continuous IV Infusions:  multi-electrolyte, 125 mL/hr, Intravenous, Continuous      PRN Meds:  acetaminophen, 650 mg, Oral, Q6H PRN  calcium carbonate, 1,000 mg, Oral, Daily PRN  diphenhydrAMINE, 25 mg, Oral, HS PRN  HYDROmorphone, 1 mg, Intravenous, Q4H PRN  ondansetron, 4 mg, Intravenous, Q6H PRN  oxyCODONE, 10 mg, Oral, Q4H PRN  oxyCODONE, 5 mg, Oral, Q4H PRN  senna, 2 tablet, Oral, Daily PRN        IP CONSULT TO UROLOGY    Network Utilization Review Department  ATTENTION: Please call with any questions or concerns to [phone] and carefully listen to the prompts so that you are directed to the right person  All voicemails are confidential   Heidi Garcia all requests for admission clinical reviews, approved or denied determinations and any other requests to dedicated fax number below belonging to the campus where the patient is receiving treatment   List of dedicated fax numbers for the Facilities:  [address] DENIALS (Administrative/Medical Necessity) [phone]   [address] (Maternity/NICU/Pediatrics) Danitza Lewis [address] Jeanette Villanueva 77 [phone]   [address] [place] [address] [facility]re[facility]  [phone]   [address] Lesly Montelongo [address] 815 [facility] [phone]

## 2022-11-27 NOTE — ASSESSMENT & PLAN NOTE
· Presented to the Ed with fever and flank pain, found to have 5mm ureteral stone with hydro and UTI  · Transferred to [facility]"" for a cystoscopy due to a lack of urology services at the transferring facility  · Continue IV ceftriaxone  · IV hydration  · Await cultures  · Analgesics and antiemetics PRN  · Consult urology for a cystoscopy

## 2022-11-27 NOTE — TREATMENT PLAN
Leticia Iqbal is a 22-year-old female awaiting transfer to [address] [place] [address] for operative intervention due to obstructing ureteral calculus  At time of initial evaluation patient had low-grade temperature  However while awaiting transport, patient has now become febrile  Discussed with ER provider  Will facilitate emergent transfer to [place]  for cystoscopy and ureteral stent placement tonight

## 2022-11-27 NOTE — OP NOTE
OPERATIVE REPORT     Name: Nydia Walker     MRN: [id number]  Date: 11/27/2022 Time: 12:21 AM     Location:       [facility] OR   Date of Operation:  11/27/2022   Service: Urology     Pre-op Diagnosis:  Right ureteral calculus    Post-op Diagnosis:  Same     Operation:     Cystoscopy  Right Retrograde Pyelogram   Right ureteral stent placement (6Fr x 22 cm JJ stent)  Fluoroscopic Guidance     Surgeon:  Yifan Shi MD  Assistants:  None      Anesthesia:  ETT   Drains:  6Fr x 22 cm JJ stent  Estimated Blood Loss:  Minimal   Urine Output:  N/A   Specimens: Right renal pelvis urine for culture  Apparent Intraoperative Complications:  None   Patient Condition:  Stable   Disposition:  PACU  Antibiotic Prophylaxis:  Rocephin     Indications:     46 y o  female with 5mm right ureteral stone and UA concerning for infection  Risks, benefits and alternatives to treatment were discussed with the patient who elected to proceed with the operation  Findings:    Cloudy urine expressed from the kidney upon placement of the wire    Operative Report:    The patient was identified, and the procedure verified  After induction of anesthesia the patient was prepped and draped in the dorsolithotomy position  ? A  film was obtained  A 22 5 Fr rigid cystoscope was passed per urethra to the bladder revealing the above findings  A Sensor wire was used to intubate the right ureteral orifice and was passed up to the renal pelvis under fluoroscopic guidance  A 5Fr open ended ureteral catheter was inserted over the wire and passed up to the renal pelvis  ? The wire was removed and a retrograde pyelogram obtained  ? The wire was replaced and passed up to the kidney under fluoroscopic guidance  ? A 6Fr ?x 22cm? JJ stent was placed in standard retrograde fashion under fluoroscopic guidance  ? Upon removal of the wire an adequate curl was noted in the renal pelvis and the bladder on fluoroscopy  The bladder was emptied   The patient was awoken from anesthesia?and transferred to PACU in satisfactory condition  ? Right retrograde pyelogram interpretation: Moderate hydronephrosis with blunted calyces    I was present for the entire procedure       Plan:    - re-admit to [facility]      - broad spectrum abx, tailor to cultures   - will make arrangements for definitive stone treatment via ureteroscopy in the near future   Patient may have stone treated by [facility] or by her Urologist in [place] based on her preference  (plan was discussed with patient's  Madonna Mackey)    SIGNATURE: Lee Garcia MD  DATE: 11/27/2022  TIME: 12:21 AM

## 2022-11-28 ENCOUNTER — TELEPHONE (OUTPATIENT)
Dept: UROLOGY | Facility: AMBULATORY SURGERY CENTER | Age: 52
End: 2022-11-28

## 2022-11-28 LAB
BACTERIA UR CULT: ABNORMAL
BACTERIA UR CULT: ABNORMAL

## 2022-11-28 NOTE — TELEPHONE ENCOUNTER
MARGOTH Donis  Patient has primary Urologist in [place], if she wishes to proceed with [facility] I can place a case request for definitive stone surgery and she can be seen prior for her H&P  Please let me know if a case request needs to be placed  Post Op Note    Dm Almaguer is a 46 y o  female s/p CYSTOSCOPY RETROGRADE PYELOGRAM WITH INSERTION STENT URETERAL (Right: Bladder) performed 11-  Dm Almaguer is a patient of Dr Pippa Irby and is seen at the [facility] while on call  How would you rate your pain on a scale from 1 to 10, 10 being the worst pain ever? 0  Have you had a fever? No    Do you have any difficulty urinating? No    Do you have any other questions or concerns that I can address at this time? Spoke with patient and she is doing well s/p procedure  Pt informed me that she would like to keep her care with [facility] and would like surgical procedure to be arranged with our services  Informed patient that I will inform the provider team and surgical team of this plan and the office will contact her moving forward to arrange for next steps

## 2022-11-28 NOTE — TELEPHONE ENCOUNTER
New Patient    What is the reason for the patient’s appointment? UTI     What office location does the patient prefer? [facility]     Imaging/Lab Results:    Do we accept the patient's insurance or is the patient Self-Pay? Yes     Insurance Provider: [facility]   Plan Type/Number:  Member ID#: Has the patient had any previous Urologist(s)? Yes- Dr Germán Yanes     Have patient records been requested? If not are records showing in [facility]: records in [facility]     Has the patient had any outside testing done? No     Does the patient have a personal history of cancer?  No     Pt call coqs-[phone]

## 2022-11-28 NOTE — TELEPHONE ENCOUNTER
I reviewed chart, pt has stent, stone was not removed and will need ESWL  Will forward to provider for when/if pt needs to come for appt

## 2022-11-29 LAB — BACTERIA UR CULT: ABNORMAL

## 2022-11-30 ENCOUNTER — PREP FOR PROCEDURE (OUTPATIENT)
Dept: UROLOGY | Facility: AMBULATORY SURGERY CENTER | Age: 52
End: 2022-11-30

## 2022-11-30 DIAGNOSIS — N20.0 NEPHROLITHIASIS: Primary | ICD-10-CM

## 2022-12-02 LAB
BACTERIA BLD CULT: NORMAL
BACTERIA BLD CULT: NORMAL

## 2023-01-04 ENCOUNTER — HOSPITAL ENCOUNTER (EMERGENCY)
Facility: HOSPITAL | Age: 53
Discharge: HOME | End: 2023-01-04
Attending: EMERGENCY MEDICINE

## 2023-01-04 ENCOUNTER — APPOINTMENT (EMERGENCY)
Dept: CT IMAGING | Facility: HOSPITAL | Age: 53
End: 2023-01-04

## 2023-01-04 VITALS
OXYGEN SATURATION: 98 % | SYSTOLIC BLOOD PRESSURE: 142 MMHG | WEIGHT: 148 LBS | HEART RATE: 82 BPM | BODY MASS INDEX: 27.94 KG/M2 | HEIGHT: 61 IN | RESPIRATION RATE: 16 BRPM | DIASTOLIC BLOOD PRESSURE: 92 MMHG | TEMPERATURE: 98.6 F

## 2023-01-04 DIAGNOSIS — N39.0 URINARY TRACT INFECTION: Primary | ICD-10-CM

## 2023-01-04 LAB
ALBUMIN SERPL BCP-MCNC: 2.7 G/DL (ref 3.5–5)
ALP SERPL-CCNC: 199 U/L (ref 46–116)
ALT SERPL W P-5'-P-CCNC: 33 U/L (ref 12–78)
ANION GAP SERPL CALCULATED.3IONS-SCNC: 10 MMOL/L (ref 4–13)
AST SERPL W P-5'-P-CCNC: 37 U/L (ref 5–45)
BACTERIA UR QL AUTO: ABNORMAL /HPF
BASOPHILS # BLD AUTO: 0.07 THOUSANDS/ÂΜL (ref 0–0.1)
BASOPHILS NFR BLD AUTO: 1 % (ref 0–1)
BILIRUB SERPL-MCNC: 0.39 MG/DL (ref 0.2–1)
BILIRUB UR QL STRIP: ABNORMAL
BUN SERPL-MCNC: 13 MG/DL (ref 5–25)
CALCIUM ALBUM COR SERPL-MCNC: 10.3 MG/DL (ref 8.3–10.1)
CALCIUM SERPL-MCNC: 9.3 MG/DL (ref 8.3–10.1)
CHLORIDE SERPL-SCNC: 102 MMOL/L (ref 96–108)
CLARITY UR: ABNORMAL
CO2 SERPL-SCNC: 25 MMOL/L (ref 21–32)
COLOR UR: ABNORMAL
CREAT SERPL-MCNC: 0.78 MG/DL (ref 0.6–1.3)
EOSINOPHIL # BLD AUTO: 0.17 THOUSAND/ÂΜL (ref 0–0.61)
EOSINOPHIL NFR BLD AUTO: 1 % (ref 0–6)
ERYTHROCYTE [DISTWIDTH] IN BLOOD BY AUTOMATED COUNT: 12.1 % (ref 11.6–15.1)
EXT PREGNANCY TEST URINE: NEGATIVE
EXT. CONTROL: NORMAL
GFR SERPL CREATININE-BSD FRML MDRD: 87 ML/MIN/1.73SQ M
GLUCOSE SERPL-MCNC: 120 MG/DL (ref 65–140)
GLUCOSE UR STRIP-MCNC: NEGATIVE MG/DL
HCT VFR BLD AUTO: 37.8 % (ref 34.8–46.1)
HGB BLD-MCNC: 12.6 G/DL (ref 11.5–15.4)
HGB UR QL STRIP.AUTO: ABNORMAL
IMM GRANULOCYTES # BLD AUTO: 0.08 THOUSAND/UL (ref 0–0.2)
IMM GRANULOCYTES NFR BLD AUTO: 1 % (ref 0–2)
KETONES UR STRIP-MCNC: ABNORMAL MG/DL
LACTATE SERPL-SCNC: 1.5 MMOL/L (ref 0.5–2)
LEUKOCYTE ESTERASE UR QL STRIP: ABNORMAL
LYMPHOCYTES # BLD AUTO: 2.13 THOUSANDS/ÂΜL (ref 0.6–4.47)
LYMPHOCYTES NFR BLD AUTO: 14 % (ref 14–44)
MCH RBC QN AUTO: 31 PG (ref 26.8–34.3)
MCHC RBC AUTO-ENTMCNC: 33.3 G/DL (ref 31.4–37.4)
MCV RBC AUTO: 93 FL (ref 82–98)
MONOCYTES # BLD AUTO: 1.48 THOUSAND/ÂΜL (ref 0.17–1.22)
MONOCYTES NFR BLD AUTO: 10 % (ref 4–12)
NEUTROPHILS # BLD AUTO: 11.55 THOUSANDS/ÂΜL (ref 1.85–7.62)
NEUTS SEG NFR BLD AUTO: 73 % (ref 43–75)
NITRITE UR QL STRIP: POSITIVE
NON-SQ EPI CELLS URNS QL MICRO: ABNORMAL /HPF
NRBC BLD AUTO-RTO: 0 /100 WBCS
PH UR STRIP.AUTO: 6.5 [PH]
PLATELET # BLD AUTO: 503 THOUSANDS/UL (ref 149–390)
PMV BLD AUTO: 9.8 FL (ref 8.9–12.7)
POTASSIUM SERPL-SCNC: 3.5 MMOL/L (ref 3.5–5.3)
PROT SERPL-MCNC: 7.2 G/DL (ref 6.4–8.4)
PROT UR STRIP-MCNC: >=300 MG/DL
RBC # BLD AUTO: 4.06 MILLION/UL (ref 3.81–5.12)
RBC #/AREA URNS AUTO: ABNORMAL /HPF
SODIUM SERPL-SCNC: 137 MMOL/L (ref 135–147)
SP GR UR STRIP.AUTO: >=1.03 (ref 1–1.03)
UROBILINOGEN UR QL STRIP.AUTO: 2 E.U./DL
WBC # BLD AUTO: 15.48 THOUSAND/UL (ref 4.31–10.16)
WBC #/AREA URNS AUTO: ABNORMAL /HPF

## 2023-01-04 RX ORDER — CEFTRIAXONE 1 G/50ML
1000 INJECTION, SOLUTION INTRAVENOUS ONCE
Status: COMPLETED | OUTPATIENT
Start: 2023-01-04 | End: 2023-01-04

## 2023-01-04 RX ORDER — CEPHALEXIN 500 MG/1
500 CAPSULE ORAL EVERY 6 HOURS SCHEDULED
Qty: 28 CAPSULE | Refills: 0 | Status: SHIPPED | OUTPATIENT
Start: 2023-01-04 | End: 2023-01-11

## 2023-01-04 RX ADMIN — SODIUM CHLORIDE 1000 ML: 0.9 INJECTION, SOLUTION INTRAVENOUS at 13:48

## 2023-01-04 RX ADMIN — CEFTRIAXONE 1000 MG: 1 INJECTION, SOLUTION INTRAVENOUS at 13:49

## 2023-01-04 NOTE — ED PROVIDER NOTES
History  Chief Complaint   Patient presents with   • Possible UTI     Patient reports UTI symptoms beginning this week  Reports frequency, urgency, blood in urine  Also reports recent hx of obstructing kidney stones with stent placement  The patient is a 46year old female, PMH ureter stent, active kidney stone with nephrosis who presents emerged from today with chief complaint of increased hematuria and back pain over the last few days at home  Patient states that since having her stent placed she has always had some flank discomfort  Patient states she is also had some hematuria  Patient states that over the last few days at home she has had increasing hematuria which has concerned her about a possible infection  Patient has been able to urinate adequately, denies any burning with urination, fevers chills nausea vomiting  Blood in Urine  The problem has been rapidly worsening since onset  She describes the hematuria as gross hematuria  She reports no clotting in her urine stream  She is experiencing no pain  She describes her urine color as dark red  Associated symptoms include flank pain  Pertinent negatives include no abdominal pain, chills, dysuria, fever or vomiting  Prior to Admission Medications   Prescriptions Last Dose Informant Patient Reported? Taking?    Ascorbic Acid, Vitamin C, (VITAMIN C) 100 MG tablet 1/4/2023  Yes Yes   Sig: Take by mouth   Cholecalciferol 50 MCG (2000 UT) CAPS 1/4/2023  Yes Yes   Sig: Take by mouth   Multiple Vitamin (multivitamin) capsule 1/4/2023  Yes Yes   Sig: Take 1 capsule by mouth daily   Potassium Gluconate 2 5 MEQ TABS Not Taking  Yes No   Sig: Take by mouth   Patient not taking: Reported on 1/4/2023   amLODIPine (NORVASC) 5 mg tablet 1/4/2023  Yes Yes   Sig: Take 5 mg by mouth daily   ibuprofen (MOTRIN) 200 mg tablet   Yes Yes   Sig: Take by mouth every 6 (six) hours as needed for mild pain   losartan (COZAAR) 100 MG tablet Not Taking  Yes No   Sig: Take 100 mg by mouth daily   Patient not taking: Reported on 1/4/2023      Facility-Administered Medications: None       Past Medical History:   Diagnosis Date   • Fibromyalgia    • Hypertension    • Kidney stone    • Lupus ([facility] [address] ) 2017   • Thoracic outlet syndrome        Past Surgical History:   Procedure Laterality Date   • APPENDECTOMY     • ENDOMETRIAL ABLATION     • FL RETROGRADE PYELOGRAM  11/27/2022   • KNEE SURGERY     • [place] CYSTO BLADDER W/URETERAL CATHETERIZATION Right 11/26/2022    Procedure: CYSTOSCOPY RETROGRADE PYELOGRAM WITH INSERTION STENT URETERAL;  Surgeon: Debora Alvarez MD;  [facility];  Service: Urology   • THORACIC SYMPATHETECTOMY         Family History   Problem Relation Age of Onset   • Osteoporosis Mother    • Cancer Father      I have reviewed and agree with the history as documented  E-Cigarette/Vaping   • E-Cigarette Use Never User      E-Cigarette/Vaping Substances     Social History     Tobacco Use   • Smoking status: Every Day     Packs/day: 0 50     Types: Cigarettes   • Smokeless tobacco: Never   Vaping Use   • Vaping Use: Never used   Substance Use Topics   • Alcohol use: Yes     Comment: Social   • Drug use: Yes     Types: Marijuana     Comment: medical       Review of Systems   Constitutional: Negative for chills and fever  HENT: Negative for ear pain and sore throat  Eyes: Negative for pain and visual disturbance  Respiratory: Negative for cough, shortness of breath and wheezing  Cardiovascular: Negative for chest pain, palpitations and leg swelling  Gastrointestinal: Negative for abdominal pain and vomiting  Genitourinary: Positive for flank pain and hematuria  Negative for dysuria  Musculoskeletal: Negative for arthralgias and back pain  Skin: Negative for color change and rash  Neurological: Negative for dizziness, seizures and syncope  All other systems reviewed and are negative        Physical Exam  Physical Exam  Vitals and nursing note reviewed  Constitutional:       General: She is not in acute distress  Appearance: She is well-developed  HENT:      Head: Normocephalic and atraumatic  Eyes:      Extraocular Movements: Extraocular movements intact  Conjunctiva/sclera: Conjunctivae normal       Pupils: Pupils are equal, round, and reactive to light  Cardiovascular:      Rate and Rhythm: Normal rate and regular rhythm  Heart sounds: No murmur heard  Pulmonary:      Effort: Pulmonary effort is normal  No respiratory distress  Breath sounds: Normal breath sounds  Abdominal:      Palpations: Abdomen is soft  Tenderness: There is no abdominal tenderness  There is no right CVA tenderness or left CVA tenderness  Musculoskeletal:         General: No swelling  Cervical back: Neck supple  Skin:     General: Skin is warm and dry  Capillary Refill: Capillary refill takes less than 2 seconds  Neurological:      General: No focal deficit present  Mental Status: She is alert and oriented to person, place, and time     Psychiatric:         Mood and Affect: Mood normal          Vital Signs  ED Triage Vitals [01/04/23 1155]   Temperature Pulse Respirations Blood Pressure SpO2   98 6 °F (37 °C) 82 16 142/92 98 %      Temp Source Heart Rate Source Patient Position - Orthostatic VS BP Location FiO2 (%)   Temporal Monitor Sitting Left arm --      Pain Score       8           Vitals:    01/04/23 1155   BP: 142/92   Pulse: 82   Patient Position - Orthostatic VS: Sitting         Visual Acuity      ED Medications  Medications   sodium chloride 0 9 % bolus 1,000 mL (0 mL Intravenous Stopped 1/4/23 1456)   cefTRIAXone (ROCEPHIN) IVPB (premix in dextrose) 1,000 mg 50 mL (0 mg Intravenous Stopped 1/4/23 1456)       Diagnostic Studies  Results Reviewed     Procedure Component Value Units Date/Time    Comprehensive metabolic panel [[id number]]  (Abnormal) Collected: 01/04/23 1347    Lab Status: Final result Specimen: Blood from Arm, Left Updated: 01/04/23 1423     Sodium 137 mmol/L      Potassium 3 5 mmol/L      Chloride 102 mmol/L      CO2 25 mmol/L      ANION GAP 10 mmol/L      BUN 13 mg/dL      Creatinine 0 78 mg/dL      Glucose 120 mg/dL      Calcium 9 3 mg/dL      Corrected Calcium 10 3 mg/dL      AST 37 U/L      ALT 33 U/L      Alkaline Phosphatase 199 U/L      Total Protein 7 2 g/dL      Albumin 2 7 g/dL      Total Bilirubin 0 39 mg/dL      eGFR 87 ml/min/1 73sq m     Narrative:      [place] guidelines for Chronic Kidney Disease (CKD):   •  Stage 1 with normal or high GFR (GFR > 90 mL/min/1 73 square meters)  •  Stage 2 Mild CKD (GFR = 60-89 mL/min/1 73 square meters)  •  Stage 3A Moderate CKD (GFR = 45-59 mL/min/1 73 square meters)  •  Stage 3B Moderate CKD (GFR = 30-44 mL/min/1 73 square meters)  •  Stage 4 Severe CKD (GFR = 15-29 mL/min/1 73 square meters)  •  Stage 5 End Stage CKD (GFR <15 mL/min/1 73 square meters)  Note: GFR calculation is accurate only with a steady state creatinine    Lactic acid [[id number]]  (Normal) Collected: 01/04/23 1347    Lab Status: Final result Specimen: Blood from Arm, Left Updated: 01/04/23 1421     LACTIC ACID 1 5 mmol/L     Narrative:      Result may be elevated if tourniquet was used during collection      CBC and differential [[id number]]  (Abnormal) Collected: 01/04/23 1347    Lab Status: Final result Specimen: Blood from Arm, Left Updated: 01/04/23 1359     WBC 15 48 Thousand/uL      RBC 4 06 Million/uL      Hemoglobin 12 6 g/dL      Hematocrit 37 8 %      MCV 93 fL      MCH 31 0 pg      MCHC 33 3 g/dL      RDW 12 1 %      MPV 9 8 fL      Platelets 817 Thousands/uL      nRBC 0 /100 WBCs      Neutrophils Relative 73 %      Immat GRANS % 1 %      Lymphocytes Relative 14 %      Monocytes Relative 10 %      Eosinophils Relative 1 %      Basophils Relative 1 %      Neutrophils Absolute 11 55 Thousands/µL      Immature Grans Absolute 0 08 Thousand/uL      Lymphocytes Absolute 2 13 Thousands/µL      Monocytes Absolute 1 48 Thousand/µL      Eosinophils Absolute 0 17 Thousand/µL      Basophils Absolute 0 07 Thousands/µL     Urine Microscopic [[id number]]  (Abnormal) Collected: 01/04/23 1202    Lab Status: Final result Specimen: Urine, Clean Catch Updated: 01/04/23 1259     RBC, UA Innumerable /hpf      WBC, UA       Field obscured, unable to enumerate     /hpf     Epithelial Cells       Field obscured, unable to enumerate     /hpf     Bacteria, UA       Field obscured, unable to enumerate     /hpf    Urine culture [[id number]] Collected: 01/04/23 1202    Lab Status: In process Specimen: Urine, Clean Catch Updated: 01/04/23 1244    UA w Reflex to Microscopic w Reflex to Culture [[id number]]  (Abnormal) Collected: 01/04/23 1202    Lab Status: Final result Specimen: Urine, Clean Catch Updated: 01/04/23 1243     Color, UA Brown     Clarity, UA Turbid     Specific Gravity, UA >=1 030     pH, UA 6 5     Leukocytes, UA Moderate     Nitrite, UA Positive     Protein, UA >=300 mg/dl      Glucose, UA Negative mg/dl      Ketones, UA Trace mg/dl      Urobilinogen, UA 2 0 E U /dl      Bilirubin, UA Moderate     Occult Blood, UA Large    POCT pregnancy, urine [[id number]]  (Normal) Resulted: 01/04/23 1205    Lab Status: Final result Updated: 01/04/23 1205     EXT Preg Test, Ur Negative     Control Valid                 CT renal stone study abdomen pelvis without contrast   Final Result by Hitesh Green MD (01/04 1429)      Interval placement of right nephroureteral stent with stable 5 mm calculus along the distal aspect of the stent image 2/106  Improved hydronephrosis and resolved perinephric stranding  No other calculi identified within the right kidney  Indeterminate right adrenal gland nodule measuring 1 6 cm, stable in retrospect  Consider adrenal protocol CT scan or MRI for further characterization nonemergently           Workstation performed: [id number] Procedures  Procedures         ED Course  ED Course as of 01/04/23 1519   Wed Jan 04, 2023   1409 Previous urine culture pan sensitive, was given IV rocephin and keflex PO                                             Medical Decision Making  The patient is a 46year old female, PMH ureter stent, active kidney stone with nephrosis who presents emerged from today with chief complaint of increased hematuria and back pain over the last few days at home  Patient states that since having her stent placed she has always had some flank discomfort  Patient states she is also had some hematuria  Patient states that over the last few days at home she has had increasing hematuria which has concerned her about a possible infection  Patient has been able to urinate adequately, denies any burning with urination, fevers chills nausea vomiting  Slight leukocytosis on lab work, UA consistent with UTI  Creatinine and lactic normal     Patient CT scan revealed improved hydronephrosis and confirmed stent placement  Was afebrile  Patient's previous urine cultures revealed pansensitive E  coli, previously was placed on Keflex after Rocephin IV  Patient given Rocephin IV here with fluids, discharged on Keflex  Patient has upcoming appointment for lithotripsy of her kidney stone by urology  Urinary tract infection: acute illness or injury  Amount and/or Complexity of Data Reviewed  Labs: ordered  Decision-making details documented in ED Course  Radiology: ordered  Decision-making details documented in ED Course  Risk  Prescription drug management            Disposition  Final diagnoses:   Urinary tract infection     Time reflects when diagnosis was documented in both MDM as applicable and the Disposition within this note     Time User Action Codes Description Comment    1/4/2023  2:49 PM Arnaldo Roman Add [N39 0] Urinary tract infection       ED Disposition     ED Disposition   Discharge    Condition Stable    Date/Time   Wed Jan 4, 2023  2:49 PM    Comment   Girish Parry discharge to home/self care  Follow-up Information    None         Discharge Medication List as of 1/4/2023  2:49 PM      START taking these medications    Details   cephalexin (KEFLEX) 500 mg capsule Take 1 capsule (500 mg total) by mouth every 6 (six) hours for 7 days, Starting Wed 1/4/2023, Until Wed 1/11/2023, Normal         CONTINUE these medications which have NOT CHANGED    Details   amLODIPine (NORVASC) 5 mg tablet Take 5 mg by mouth daily, Starting u 5/13/2021, Historical Med      Ascorbic Acid, Vitamin C, (VITAMIN C) 100 MG tablet Take by mouth, Historical Med      Cholecalciferol 50 MCG (2000 UT) CAPS Take by mouth, Historical Med      ibuprofen (MOTRIN) 200 mg tablet Take by mouth every 6 (six) hours as needed for mild pain, Historical Med      losartan (COZAAR) 100 MG tablet Take 100 mg by mouth daily, Starting Tue 9/20/2022, Historical Med      Multiple Vitamin (multivitamin) capsule Take 1 capsule by mouth daily, Historical Med      Potassium Gluconate 2 5 MEQ TABS Take by mouth, Historical Med             No discharge procedures on file      PDMP Review       Value Time User    PDMP Reviewed  Yes 11/27/2022  3:15 PM [facility]          ED Provider  Electronically Signed by           Ta Lewis PA-C  01/04/23 9553

## 2023-01-06 LAB
BACTERIA UR CULT: ABNORMAL
BACTERIA UR CULT: ABNORMAL

## 2023-01-26 PROBLEM — N39.0 UTI (URINARY TRACT INFECTION): Status: RESOLVED | Noted: 2022-11-26 | Resolved: 2023-01-26

## 2023-01-31 ENCOUNTER — APPOINTMENT (OUTPATIENT)
Dept: LAB | Facility: HOSPITAL | Age: 53
End: 2023-01-31

## 2023-01-31 DIAGNOSIS — N20.1 CALCULUS OF URETER: ICD-10-CM

## 2023-01-31 LAB
BACTERIA UR QL AUTO: ABNORMAL /HPF
BILIRUB UR QL STRIP: ABNORMAL
CLARITY UR: ABNORMAL
COLOR UR: ABNORMAL
GLUCOSE UR STRIP-MCNC: NEGATIVE MG/DL
HGB UR QL STRIP.AUTO: ABNORMAL
KETONES UR STRIP-MCNC: ABNORMAL MG/DL
LEUKOCYTE ESTERASE UR QL STRIP: ABNORMAL
NITRITE UR QL STRIP: NEGATIVE
NON-SQ EPI CELLS URNS QL MICRO: ABNORMAL /HPF
PH UR STRIP.AUTO: 6.5 [PH]
PROT UR STRIP-MCNC: ABNORMAL MG/DL
RBC #/AREA URNS AUTO: ABNORMAL /HPF
SP GR UR STRIP.AUTO: >=1.03 (ref 1–1.03)
UROBILINOGEN UR QL STRIP.AUTO: 0.2 E.U./DL
WBC #/AREA URNS AUTO: ABNORMAL /HPF

## 2023-02-01 LAB — BACTERIA UR CULT: NORMAL

## 2023-02-03 ENCOUNTER — TELEPHONE (OUTPATIENT)
Dept: OTHER | Facility: OTHER | Age: 53
End: 2023-02-03

## 2023-02-03 NOTE — TELEPHONE ENCOUNTER
Nuha Paz from [facility] call requesting a call back from the office to get more information about patient referral   Patient have an appointment on Tuesday       Please contact her at [phone]   Today before 3:30 pm or Monday after 11:00 am

## 2023-02-06 NOTE — TELEPHONE ENCOUNTER
Spoke to Katherine Mayberry at [facility]/PCP and information regarding upcoming procedure given for them to get referral from [employer]  Will fax to us when approved

## 2023-02-07 RX ORDER — BUPROPION HYDROCHLORIDE 150 MG/1
TABLET, EXTENDED RELEASE ORAL
COMMUNITY
Start: 2023-01-17

## 2023-02-07 RX ORDER — CHLORAL HYDRATE 500 MG
CAPSULE ORAL
COMMUNITY

## 2023-02-07 NOTE — PRE-PROCEDURE INSTRUCTIONS
Pre-Surgery Instructions:   Medication Instructions   • Ascorbic Acid, Vitamin C, (VITAMIN C) 100 MG tablet Stop taking 7 days prior to surgery  • buPROPion (WELLBUTRIN SR) 150 mg 12 hr tablet Take day of surgery  • Cholecalciferol 50 MCG (2000 UT) CAPS Hold day of surgery  • ibuprofen (MOTRIN) 200 mg tablet Stop taking 7 days prior to surgery  • losartan (COZAAR) 100 MG tablet Hold day of surgery  • Multiple Vitamin (multivitamin) capsule Stop taking 7 days prior to surgery  • Omega-3 Fatty Acids (fish oil) 1,000 mg Stop taking 7 days prior to surgery  Covid screening negative as per patient  Fully vaccinated  Reviewed showering and medication instructions  Instructed to stop NSAIDS and non prescribed vitamins 7 days prior to DOS  Tylenol is OK to take  Take medications morning of surgery with a small sip of water  Patient verbalized understanding  Advised NPO after MN and ASC will call with scheduled surgical time  Advised to notify surgeon's office for any illness prior to surgery

## 2023-02-13 ENCOUNTER — ANESTHESIA EVENT (OUTPATIENT)
Dept: PERIOP | Facility: HOSPITAL | Age: 53
End: 2023-02-13

## 2023-02-13 NOTE — H&P
HISTORY AND PHYSICAL  ? ? Patient Name: Althea Reeves  Patient MRN: [id number]  Attending Provider: Cookie Clark MD  Service: Urology  Chief Complaint    46 y o  female with 5mm right ureteral stone     HPI   Althea Reeves is a 46 y o  female with stent placed for above stone  Had infection at that time  Now clear  I plan cystoscopy, right ureteroscopy, laser stone, stent  Potential risks and complications discussed, and informed consent was given by the patient  Medications  Meds/Allergies   No current facility-administered medications for this encounter  Cannot display prior to admission medications because the patient has not been admitted in this contact  No current facility-administered medications for this encounter      Current Outpatient Medications:   •  Ascorbic Acid, Vitamin C, (VITAMIN C) 100 MG tablet, Take by mouth, Disp: , Rfl:   •  buPROPion (WELLBUTRIN SR) 150 mg 12 hr tablet, One tablet once daily for 3 days, then increase to twice daily, Disp: , Rfl:   •  Cholecalciferol 50 MCG (2000 UT) CAPS, Take by mouth, Disp: , Rfl:   •  ibuprofen (MOTRIN) 200 mg tablet, Take by mouth every 6 (six) hours as needed for mild pain, Disp: , Rfl:   •  losartan (COZAAR) 100 MG tablet, Take 100 mg by mouth daily, Disp: , Rfl:   •  Multiple Vitamin (multivitamin) capsule, Take 1 capsule by mouth daily, Disp: , Rfl:   •  Omega-3 Fatty Acids (fish oil) 1,000 mg, Take by mouth, Disp: , Rfl:   •  amLODIPine (NORVASC) 5 mg tablet, Take 5 mg by mouth daily (Patient not taking: Reported on 2/7/2023), Disp: , Rfl:   •  Potassium Gluconate 2 5 MEQ TABS, Take by mouth (Patient not taking: Reported on 1/4/2023), Disp: , Rfl:   Review of Systems  10 point review of systems negative except as noted in HPI  Allergies  No Known Allergies  PMH  Past Medical History:   Diagnosis Date   • Chronic bronchitis (HCC)    • Fibromyalgia    • Hypertension    • Kidney stone    • Lupus ([facility] [place] [address] ) 2017   • Migraines    • Thoracic outlet syndrome      Past surgical history  Past Surgical History:   Procedure Laterality Date   • APPENDECTOMY     • ENDOMETRIAL ABLATION     • FL RETROGRADE PYELOGRAM  11/27/2022   • KNEE SURGERY     • [place] CYSTO BLADDER W/URETERAL CATHETERIZATION Right 11/26/2022    Procedure: CYSTOSCOPY RETROGRADE PYELOGRAM WITH INSERTION STENT URETERAL;  Surgeon: Carin Medrano MD;  Location: [facility];  Service: Urology   • THORACIC SYMPATHETECTOMY       Social history  Social History     Tobacco Use   • Smoking status: Every Day     Packs/day: 0 50     Years: 20 00     Pack years: 10 00     Types: Cigarettes   • Smokeless tobacco: Never   Vaping Use   • Vaping Use: Never used   Substance Use Topics   • Alcohol use: Yes     Alcohol/week: 5 0 standard drinks     Types: 5 Standard drinks or equivalent per week     Comment: Social   • Drug use: Yes     Types: Marijuana     Comment: Marijuana tincture- has medical marijuana card     ?   Physical Exam     vs  General appearance: alert and oriented, in no acute distress  Head: Normocephalic, without obvious abnormality, atraumatic  Throat: lips, mucosa, and tongue normal; teeth and gums normal  Neck: no adenopathy, no carotid bruit, no JVD, supple, symmetrical, trachea midline and thyroid not enlarged, symmetric, no tenderness/mass/nodules  Lungs: clear to auscultation bilaterally  Heart: regular rate and rhythm, S1, S2 normal, no murmur, click, rub or gallop  Abdomen: soft, non-tender; bowel sounds normal; no masses,  no organomegaly  Extremities: extremities normal, warm and well-perfused; no cyanosis, clubbing, or edema  Marina Gilbert MD

## 2023-02-14 ENCOUNTER — ANESTHESIA (OUTPATIENT)
Dept: PERIOP | Facility: HOSPITAL | Age: 53
End: 2023-02-14

## 2023-02-14 ENCOUNTER — APPOINTMENT (OUTPATIENT)
Dept: RADIOLOGY | Facility: HOSPITAL | Age: 53
End: 2023-02-14

## 2023-02-14 ENCOUNTER — TELEPHONE (OUTPATIENT)
Dept: UROLOGY | Facility: AMBULATORY SURGERY CENTER | Age: 53
End: 2023-02-14

## 2023-02-14 ENCOUNTER — HOSPITAL ENCOUNTER (OUTPATIENT)
Facility: HOSPITAL | Age: 53
Setting detail: OUTPATIENT SURGERY
Discharge: HOME | End: 2023-02-14
Attending: UROLOGY | Admitting: UROLOGY

## 2023-02-14 VITALS
OXYGEN SATURATION: 96 % | WEIGHT: 139.11 LBS | TEMPERATURE: 97.7 F | RESPIRATION RATE: 18 BRPM | HEART RATE: 77 BPM | BODY MASS INDEX: 26.26 KG/M2 | SYSTOLIC BLOOD PRESSURE: 161 MMHG | HEIGHT: 61 IN | DIASTOLIC BLOOD PRESSURE: 71 MMHG

## 2023-02-14 DIAGNOSIS — T65.291A TOXIC EFFECT OF TOBACCO AND NICOTINE, ACCIDENTAL OR UNINTENTIONAL, INITIAL ENCOUNTER: Primary | ICD-10-CM

## 2023-02-14 DIAGNOSIS — N20.1 CALCULUS OF URETER: ICD-10-CM

## 2023-02-14 PROBLEM — IMO0002 LUPUS: Status: ACTIVE | Noted: 2017-01-01

## 2023-02-14 PROBLEM — M32.9 LUPUS (HCC): Status: ACTIVE | Noted: 2017-01-01

## 2023-02-14 DEVICE — VARIABLE LENGTH INJECTION STENT SET
Type: IMPLANTABLE DEVICE | Site: URETER | Status: FUNCTIONAL
Brand: CONTOUR VL™ INJECTION STENT SET

## 2023-02-14 RX ORDER — FENTANYL CITRATE/PF 50 MCG/ML
25 SYRINGE (ML) INJECTION
Status: DISCONTINUED | OUTPATIENT
Start: 2023-02-14 | End: 2023-02-14 | Stop reason: HOSPADM

## 2023-02-14 RX ORDER — ONDANSETRON 2 MG/ML
INJECTION INTRAMUSCULAR; INTRAVENOUS AS NEEDED
Status: DISCONTINUED | OUTPATIENT
Start: 2023-02-14 | End: 2023-02-14

## 2023-02-14 RX ORDER — OXYBUTYNIN CHLORIDE 5 MG/1
TABLET ORAL
Qty: 15 TABLET | Refills: 0 | Status: SHIPPED | OUTPATIENT
Start: 2023-02-14

## 2023-02-14 RX ORDER — HYDROMORPHONE HCL/PF 1 MG/ML
0.5 SYRINGE (ML) INJECTION
Status: CANCELLED | OUTPATIENT
Start: 2023-02-14

## 2023-02-14 RX ORDER — MAGNESIUM HYDROXIDE 1200 MG/15ML
LIQUID ORAL AS NEEDED
Status: DISCONTINUED | OUTPATIENT
Start: 2023-02-14 | End: 2023-02-14 | Stop reason: HOSPADM

## 2023-02-14 RX ORDER — SODIUM CHLORIDE 9 MG/ML
125 INJECTION, SOLUTION INTRAVENOUS CONTINUOUS
Status: DISCONTINUED | OUTPATIENT
Start: 2023-02-14 | End: 2023-02-14 | Stop reason: HOSPADM

## 2023-02-14 RX ORDER — CEFUROXIME AXETIL 250 MG/1
250 TABLET ORAL EVERY 12 HOURS SCHEDULED
Qty: 14 TABLET | Refills: 0 | Status: SHIPPED | OUTPATIENT
Start: 2023-02-14 | End: 2023-02-21

## 2023-02-14 RX ORDER — DEXAMETHASONE SODIUM PHOSPHATE 10 MG/ML
INJECTION, SOLUTION INTRAMUSCULAR; INTRAVENOUS AS NEEDED
Status: DISCONTINUED | OUTPATIENT
Start: 2023-02-14 | End: 2023-02-14

## 2023-02-14 RX ORDER — HYDROMORPHONE HCL/PF 1 MG/ML
0.5 SYRINGE (ML) INJECTION
Status: DISCONTINUED | OUTPATIENT
Start: 2023-02-14 | End: 2023-02-14 | Stop reason: HOSPADM

## 2023-02-14 RX ORDER — FENTANYL CITRATE 50 UG/ML
INJECTION, SOLUTION INTRAMUSCULAR; INTRAVENOUS AS NEEDED
Status: DISCONTINUED | OUTPATIENT
Start: 2023-02-14 | End: 2023-02-14

## 2023-02-14 RX ORDER — ONDANSETRON 2 MG/ML
4 INJECTION INTRAMUSCULAR; INTRAVENOUS ONCE AS NEEDED
Status: DISCONTINUED | OUTPATIENT
Start: 2023-02-14 | End: 2023-02-14 | Stop reason: HOSPADM

## 2023-02-14 RX ORDER — OXYCODONE HYDROCHLORIDE AND ACETAMINOPHEN 5; 325 MG/1; MG/1
1 TABLET ORAL EVERY 4 HOURS PRN
Status: DISCONTINUED | OUTPATIENT
Start: 2023-02-14 | End: 2023-02-14 | Stop reason: HOSPADM

## 2023-02-14 RX ORDER — LIDOCAINE HYDROCHLORIDE 20 MG/ML
INJECTION, SOLUTION EPIDURAL; INFILTRATION; INTRACAUDAL; PERINEURAL AS NEEDED
Status: DISCONTINUED | OUTPATIENT
Start: 2023-02-14 | End: 2023-02-14

## 2023-02-14 RX ORDER — LEVOFLOXACIN 5 MG/ML
500 INJECTION, SOLUTION INTRAVENOUS EVERY 24 HOURS
Status: DISCONTINUED | OUTPATIENT
Start: 2023-02-14 | End: 2023-02-14 | Stop reason: HOSPADM

## 2023-02-14 RX ORDER — MIDAZOLAM HYDROCHLORIDE 2 MG/2ML
INJECTION, SOLUTION INTRAMUSCULAR; INTRAVENOUS AS NEEDED
Status: DISCONTINUED | OUTPATIENT
Start: 2023-02-14 | End: 2023-02-14

## 2023-02-14 RX ORDER — PROPOFOL 10 MG/ML
INJECTION, EMULSION INTRAVENOUS AS NEEDED
Status: DISCONTINUED | OUTPATIENT
Start: 2023-02-14 | End: 2023-02-14

## 2023-02-14 RX ORDER — OXYCODONE HYDROCHLORIDE AND ACETAMINOPHEN 5; 325 MG/1; MG/1
1-2 TABLET ORAL EVERY 6 HOURS PRN
Qty: 10 TABLET | Refills: 0 | Status: SHIPPED | OUTPATIENT
Start: 2023-02-14 | End: 2023-02-24

## 2023-02-14 RX ORDER — KETOROLAC TROMETHAMINE 30 MG/ML
INJECTION, SOLUTION INTRAMUSCULAR; INTRAVENOUS AS NEEDED
Status: DISCONTINUED | OUTPATIENT
Start: 2023-02-14 | End: 2023-02-14

## 2023-02-14 RX ADMIN — LIDOCAINE HYDROCHLORIDE 80 MG: 20 INJECTION, SOLUTION EPIDURAL; INFILTRATION; INTRACAUDAL; PERINEURAL at 13:23

## 2023-02-14 RX ADMIN — MIDAZOLAM 2 MG: 1 INJECTION INTRAMUSCULAR; INTRAVENOUS at 13:10

## 2023-02-14 RX ADMIN — KETOROLAC TROMETHAMINE 30 MG: 30 INJECTION, SOLUTION INTRAMUSCULAR at 13:57

## 2023-02-14 RX ADMIN — LEVOFLOXACIN: 500 INJECTION, SOLUTION INTRAVENOUS at 13:30

## 2023-02-14 RX ADMIN — FENTANYL CITRATE 25 MCG: 50 INJECTION INTRAMUSCULAR; INTRAVENOUS at 13:41

## 2023-02-14 RX ADMIN — SODIUM CHLORIDE 125 ML/HR: 0.9 INJECTION, SOLUTION INTRAVENOUS at 11:09

## 2023-02-14 RX ADMIN — FENTANYL CITRATE 25 MCG: 50 INJECTION INTRAMUSCULAR; INTRAVENOUS at 13:32

## 2023-02-14 RX ADMIN — ONDANSETRON 4 MG: 2 INJECTION INTRAMUSCULAR; INTRAVENOUS at 13:55

## 2023-02-14 RX ADMIN — FENTANYL CITRATE 25 MCG: 50 INJECTION INTRAMUSCULAR; INTRAVENOUS at 13:28

## 2023-02-14 RX ADMIN — PROPOFOL 180 MG: 10 INJECTION, EMULSION INTRAVENOUS at 13:23

## 2023-02-14 RX ADMIN — DEXAMETHASONE SODIUM PHOSPHATE 8 MG: 10 INJECTION INTRAMUSCULAR; INTRAVENOUS at 13:30

## 2023-02-14 NOTE — DISCHARGE SUMMARY
Discharge Summary - Vick Cheng 46 y o  female MRN: [id number]    Admission Date: 2/14/2023     Admitting Diagnosis: Calculus of ureter [N20 1]    Procedures Performed: Right ureteroscopy laser stone stent    Patient underwent planned outpt surgery and recovered without complication  Discharged in good condition  Medications were prescribed  Pt knows to have office follow-up at the appropriate time

## 2023-02-14 NOTE — OP NOTE
OPERATIVE REPORT  PATIENT NAME: Ama Law    :  1970  MRN: [id number]  Pt Location: [facility] OR ROOM 06    SURGERY DATE: 2023    Surgeon(s) and Role:     * Chung Turpin MD - Primary    Preop Diagnosis:  Calculus of ureter [N20 1]    Post-Op Diagnosis Codes:     * Calculus of ureter [N20 1]    Procedure(s):  Right - CYSTO USCOPE W/  LASER  & STENT    Specimen(s):  ID Type Source Tests Collected by Time Destination   A :  Urine Urine, Other URINE CULTURE Chung Turpin MD 2023  1:37 PM        Estimated Blood Loss:   Minimal    Drains:  Ureteral Internal Stent Right ureter 6 Fr  (Active)   Number of days: 0       Anesthesia Type:   Choice    Operative Indications:  Calculus of ureter [N20 1]      Operative Findings:  6 mm stone distal right ureter about 6 cm above bladder, fragmented well and new 6 Uzbek stent placed  Complications:   None    Procedure and Technique:      PLAN FOR STENT: Will be removed by nurse this coming Friday      The patient was brought into the OR, properly identified and positioned on the table  General anesthesia was induced, and they were placed in lithotomy position and prepped and draped using chlorhexidine solution  The 22F scope was introduced in the urethra and bladder  Other findings upon placement of the scope included mild bladder inflammation  The right ureteral stent was grasped and withdrawn  One wire was placed up the ureter  The rigid ureteroscope was introduced and carefully advanced up to stone  The Holmium laser fiber was introduced thru the scope and advanced to the edge of stone  Using various laser settings, stone was lasered into numerous small particles  Care was taken not to laser tissue     All other remaining particles appeared small enough to pass around the stent       The scope was removed from the ureter, and the cystoscope was used to place a 6F Contour VL stent in the ureter, with the upper coils in the renal pelvis, and the distal coil in the bladder  Retrograde pyelogram on that side confirmed good position and no extravasation of contrast    The patient was awaken from anesthesia and taken to the PACU in good condition        I was present for the entire procedure    Patient Disposition:  PACU         SIGNATURE: Viv Roa MD  DATE: February 14, 2023  TIME: 2:04 PM

## 2023-02-14 NOTE — ANESTHESIA PREPROCEDURE EVALUATION
Procedure:  CYSTO USCOPE W/  LASER, & STENT (Right: Bladder)    Relevant Problems   CARDIO   (+) Hypertension   (+) Migraines      /RENAL   (+) Hydroureteronephrosis      MUSCULOSKELETAL   (+) Fibromyalgia      NEURO/PSYCH   (+) Fibromyalgia   (+) Migraines      PULMONARY   (+) Centrilobular emphysema (HCC)      Other   (+) Tobacco abuse        Physical Exam    Airway    Mallampati score: II  TM Distance: >3 FB  Neck ROM: full     Dental   No notable dental hx     Cardiovascular  Rhythm: regular, Rate: normal, Cardiovascular exam normal    Pulmonary  Pulmonary exam normal Breath sounds clear to auscultation,     Other Findings        Anesthesia Plan  ASA Score- 3     Anesthesia Type- general with ASA Monitors  Additional Monitors:   Airway Plan: LMA  Plan Factors-Exercise tolerance (METS): >4 METS  Chart reviewed  Existing labs reviewed  Patient summary reviewed  Patient is a current smoker  Patient instructed to abstain from smoking on day of procedure  Patient did not smoke on day of surgery  Obstructive sleep apnea risk education given perioperatively  Induction- intravenous  Postoperative Plan-     Informed Consent- Anesthetic plan and risks discussed with patient  I personally reviewed this patient with the CRNA  Discussed and agreed on the Anesthesia Plan with the EUGENE Jordan

## 2023-02-14 NOTE — DISCHARGE INSTR - AVS FIRST PAGE
ALL YOUR  PREVIOUS MEDS ARE THE SAME  NEW MEDS: Oxycodone if needed for pain  Cefuroxime antibiotic  Oxybutynin if needed for bladder pressure    BE CAREFUL NOT TO PULL THE STRING  EXPECT SOME BLOOD IN URINE, BURNING, FREQUENT URINATION  ACTIVITY:  RESUME FULL ACTIVITY after stent is out

## 2023-02-14 NOTE — ANESTHESIA POSTPROCEDURE EVALUATION
Post-Op Assessment Note    CV Status:  Stable    Pain management: adequate     Mental Status:  Alert and awake   Hydration Status:  Euvolemic   PONV Controlled:  Controlled   Airway Patency:  Patent      Post Op Vitals Reviewed: Yes      Staff: Anesthesiologist         No notable events documented      BP      Temp      Pulse     Resp      SpO2      /71   Pulse 77   Temp 97 7 °F (36 5 °C)   Resp 18   Ht 5' 1" (1 549 m)   Wt 63 1 kg (139 lb 1 8 oz)   SpO2 96%   BMI 26 28 kg/m²

## 2023-02-14 NOTE — INTERVAL H&P NOTE
H&P reviewed  After examining the patient I find no changes in the patients condition since the H&P had been written      Vitals:    02/14/23 1132   BP: 137/84   Pulse: 72   Resp:    Temp:    SpO2:

## 2023-02-15 LAB — BACTERIA UR CULT: NORMAL

## 2023-02-15 NOTE — TELEPHONE ENCOUNTER
Post Op Note    Ludie Phoenix is a 46 y o  female s/p Cysto Right stent performed 02/15/2023  Ludie [place] is a patient of Dr Amy Chong     How would you rate your pain on a scale from 1 to 10, 10 being the worst pain ever? 6  Have you had a fever? No  Have your bowel movements been regular? Yes  Do you have any difficulty urinating? No  Nikki you have any other questions or concerns that I can address at this time? Patient states that Saumya Dey is closest to her    I was unable to schedule appointment for Friday

## 2023-02-16 ENCOUNTER — PROCEDURE VISIT (OUTPATIENT)
Dept: UROLOGY | Facility: CLINIC | Age: 53
End: 2023-02-16

## 2023-02-16 DIAGNOSIS — Z46.6 ENCOUNTER FOR REMOVAL OF URETERAL STENT: Primary | ICD-10-CM

## 2023-02-16 RX ORDER — OMEGA-3/DHA/EPA/FISH OIL 300-1000MG
1 CAPSULE ORAL
COMMUNITY

## 2023-02-16 RX ORDER — CEPHALEXIN 500 MG/1
CAPSULE ORAL
COMMUNITY
Start: 2023-01-20

## 2023-02-16 RX ORDER — ZINC GLUCONATE 50 MG
50 TABLET ORAL DAILY
COMMUNITY

## 2023-02-16 RX ORDER — PREGABALIN 150 MG/1
CAPSULE ORAL
COMMUNITY

## 2023-02-16 RX ORDER — MAGNESIUM OXIDE 400 MG/1
TABLET ORAL
COMMUNITY

## 2023-02-16 NOTE — TELEPHONE ENCOUNTER
Spoke w/ Dr Murphy Trujillo, gave verbal for pt to come in today  Pt aware, & scheduled  Still having bleeding no pain

## 2023-02-16 NOTE — PROGRESS NOTES
UROLOGY PROGRESS NOTE         NAME: Darcie Fleischer  AGE: 46 y o  SEX: female  : 1970   MRN: [id number]    DATE: 2023  TIME: 12:39 PM    Assessment and Plan   Procedures     Impression:   1  Encounter for removal of ureteral stent         Plan: Stent removal in the office today without difficulty  I discussed with the patient what to expect postop now with the stent out including intermittent hematuria urgency frequency and burning for a few days  She understands and agrees  I gave her a low oxalate diet sheet to follow her stone analysis is currently pending we will call if it is different  We will follow-up with patient in 2 years with a KUB  Or sooner if she has a concern  She requested to be seen appear since is closer to her house  I will communicate that with Dr David Magallanes thanks      Chief Complaint   No chief complaint on file  History of Present Illness     HPI: Darcie Fleischer is a 46y o  year old female who presents with stent removal on procedure done by Dr David Magallanes on 2023  Patient had a cystoscopy right ureteroscopy laser lithotripsy and stent exchange  On 2022 with Dr Roshni Parrish  patient had a cystoscopy retrograde pyelogram and insertion of right stent  Review of CT scan on 2023 showed the right stent in place with a 5 mm distal ureteral calculi        Currently patient doing well tolerating the stent no fever chills nausea      The following portions of the patient's history were reviewed and updated as appropriate: allergies, current medications, past family history, past medical history, past social history, past surgical history and problem list   Past Medical History:   Diagnosis Date   • Chronic bronchitis ([facility] [address] )    • Fibromyalgia    • Hypertension    • Kidney stone    • [facility] 2017   • Migraines    • Thoracic outlet syndrome      Past Surgical History:   Procedure Laterality Date   • APPENDECTOMY     • ENDOMETRIAL ABLATION     • FL RETROGRADE PYELOGRAM  11/27/2022   • KNEE SURGERY     • DC CYSTO BLADDER W/URETERAL CATHETERIZATION Right 11/26/2022    Procedure: CYSTOSCOPY RETROGRADE PYELOGRAM WITH INSERTION STENT URETERAL;  Surgeon: Rosales Osullivan MD;  [facility];  Service: Urology   • DC CYSTO/URETERO W/LITHOTRIPSY &INDWELL STENT INSRT Right 2/14/2023    Procedure: CYSTO USCOPE W/  LASER, & STENT EXCHANGE;  Surgeon: Lesley Colin MD;  Location: [facility];  Service: Urology   • THORACIC SYMPATHETECTOMY       shoulder  Review of Systems     Const: Denies chills, fever and weight loss  CV: Denies chest pain  Resp: Denies SOB  GI: Denies abdominal pain, nausea and vomiting  : Denies symptoms other than stated above  Musculo: Denies back pain  Objective   There were no vitals taken for this visit  Physical Exam  Const: Appears healthy and well developed  No signs of acute distress present  Resp: Respirations are regular and unlabored  CV: Rate is regular  Rhythm is regular  Abdomen: Abdomen is soft, nontender, and nondistended  Kidneys are not palpable  : String emanating from the urethral meatus intact  Psych: Patient's attitude is cooperative   Mood is normal  Affect is normal     Current Medications     Current Outpatient Medications:   •  amLODIPine (NORVASC) 5 mg tablet, Take 5 mg by mouth daily (Patient not taking: Reported on 2/7/2023), Disp: , Rfl:   •  Ascorbic Acid, Vitamin C, (VITAMIN C) 100 MG tablet, Take by mouth, Disp: , Rfl:   •  buPROPion (WELLBUTRIN SR) 150 mg 12 hr tablet, One tablet once daily for 3 days, then increase to twice daily, Disp: , Rfl:   •  cefuroxime (CEFTIN) 250 mg tablet, Take 1 tablet (250 mg total) by mouth every 12 (twelve) hours for 7 days, Disp: 14 tablet, Rfl: 0  •  Cholecalciferol 50 MCG (2000 UT) CAPS, Take by mouth, Disp: , Rfl:   •  ibuprofen (MOTRIN) 200 mg tablet, Take by mouth every 6 (six) hours as needed for mild pain, Disp: , Rfl:   •  losartan (COZAAR) 100 MG tablet, Take 100 mg by mouth daily, Disp: , Rfl:   •  Multiple Vitamin (multivitamin) capsule, Take 1 capsule by mouth daily, Disp: , Rfl:   •  Omega-3 Fatty Acids (fish oil) 1,000 mg, Take by mouth, Disp: , Rfl:   •  oxybutynin (DITROPAN) 5 mg tablet, Every 6-8 hours if needed for bladder pressure or spasm, Disp: 15 tablet, Rfl: 0  •  oxyCODONE-acetaminophen (PERCOCET) 5-325 mg per tablet, Take 1-2 tablets by mouth every 6 (six) hours as needed for moderate pain or severe pain for up to 10 days Max Daily Amount: 8 tablets, Disp: 10 tablet, Rfl: 0  •  Potassium Gluconate 2 5 MEQ TABS, Take by mouth (Patient not taking: Reported on 1/4/2023), Disp: , Rfl:         Everton Mai MD

## 2023-09-12 LAB — EXTERNAL HIV SCREEN: NORMAL

## 2023-11-21 LAB — HCV AB SER-ACNC: NORMAL

## 2024-08-19 ENCOUNTER — HOSPITAL ENCOUNTER (EMERGENCY)
Facility: HOSPITAL | Age: 54
Discharge: HOME | End: 2024-08-19
Attending: STUDENT IN AN ORGANIZED HEALTH CARE EDUCATION/TRAINING PROGRAM
Payer: COMMERCIAL

## 2024-08-19 ENCOUNTER — APPOINTMENT (EMERGENCY)
Dept: RADIOLOGY | Facility: HOSPITAL | Age: 54
End: 2024-08-19
Payer: COMMERCIAL

## 2024-08-19 VITALS
OXYGEN SATURATION: 95 % | SYSTOLIC BLOOD PRESSURE: 153 MMHG | HEIGHT: 61 IN | BODY MASS INDEX: 27.38 KG/M2 | TEMPERATURE: 98.4 F | HEART RATE: 77 BPM | DIASTOLIC BLOOD PRESSURE: 67 MMHG | RESPIRATION RATE: 18 BRPM | WEIGHT: 145 LBS

## 2024-08-19 DIAGNOSIS — R10.13 EPIGASTRIC ABDOMINAL PAIN: ICD-10-CM

## 2024-08-19 DIAGNOSIS — R11.0 NAUSEA: ICD-10-CM

## 2024-08-19 DIAGNOSIS — R07.89 CHEST DISCOMFORT: ICD-10-CM

## 2024-08-19 DIAGNOSIS — R10.13 DYSPEPSIA: ICD-10-CM

## 2024-08-19 DIAGNOSIS — I49.3 FREQUENT PVCS: Primary | ICD-10-CM

## 2024-08-19 DIAGNOSIS — R00.2 PALPITATIONS: ICD-10-CM

## 2024-08-19 LAB
ALBUMIN SERPL BCG-MCNC: 4.4 G/DL (ref 3.5–5)
ALP SERPL-CCNC: 90 U/L (ref 34–104)
ALT SERPL W P-5'-P-CCNC: 14 U/L (ref 7–52)
ANION GAP SERPL CALCULATED.3IONS-SCNC: 7 MMOL/L (ref 4–13)
AST SERPL W P-5'-P-CCNC: 21 U/L (ref 13–39)
BASOPHILS # BLD AUTO: 0.06 THOUSANDS/ÂΜL (ref 0–0.1)
BASOPHILS NFR BLD AUTO: 1 % (ref 0–1)
BILIRUB SERPL-MCNC: 0.8 MG/DL (ref 0.2–1)
BILIRUB UR QL STRIP: NEGATIVE
BUN SERPL-MCNC: 13 MG/DL (ref 5–25)
CALCIUM SERPL-MCNC: 9.5 MG/DL (ref 8.4–10.2)
CARDIAC TROPONIN I PNL SERPL HS: 3 NG/L (ref 8–18)
CHLORIDE SERPL-SCNC: 106 MMOL/L (ref 96–108)
CLARITY UR: CLEAR
CO2 SERPL-SCNC: 26 MMOL/L (ref 21–32)
COLOR UR: YELLOW
CREAT SERPL-MCNC: 0.84 MG/DL (ref 0.6–1.3)
EOSINOPHIL # BLD AUTO: 0.21 THOUSAND/ÂΜL (ref 0–0.61)
EOSINOPHIL NFR BLD AUTO: 2 % (ref 0–6)
ERYTHROCYTE [DISTWIDTH] IN BLOOD BY AUTOMATED COUNT: 12.5 % (ref 11.6–15.1)
GFR SERPL CREATININE-BSD FRML MDRD: 79 ML/MIN/1.73SQ M
GLUCOSE SERPL-MCNC: 91 MG/DL (ref 65–140)
GLUCOSE UR STRIP-MCNC: NEGATIVE MG/DL
HCT VFR BLD AUTO: 42.1 % (ref 34.8–46.1)
HGB BLD-MCNC: 14.2 G/DL (ref 11.5–15.4)
HGB UR QL STRIP.AUTO: NEGATIVE
IMM GRANULOCYTES # BLD AUTO: 0.03 THOUSAND/UL (ref 0–0.2)
IMM GRANULOCYTES NFR BLD AUTO: 0 % (ref 0–2)
KETONES UR STRIP-MCNC: NEGATIVE MG/DL
LEUKOCYTE ESTERASE UR QL STRIP: NEGATIVE
LIPASE SERPL-CCNC: 41 U/L (ref 11–82)
LYMPHOCYTES # BLD AUTO: 2.59 THOUSANDS/ÂΜL (ref 0.6–4.47)
LYMPHOCYTES NFR BLD AUTO: 26 % (ref 14–44)
MCH RBC QN AUTO: 31.7 PG (ref 26.8–34.3)
MCHC RBC AUTO-ENTMCNC: 33.7 G/DL (ref 31.4–37.4)
MCV RBC AUTO: 94 FL (ref 82–98)
MONOCYTES # BLD AUTO: 0.66 THOUSAND/ÂΜL (ref 0.17–1.22)
MONOCYTES NFR BLD AUTO: 7 % (ref 4–12)
NEUTROPHILS # BLD AUTO: 6.36 THOUSANDS/ÂΜL (ref 1.85–7.62)
NEUTS SEG NFR BLD AUTO: 64 % (ref 43–75)
NITRITE UR QL STRIP: NEGATIVE
NRBC BLD AUTO-RTO: 0 /100 WBCS
PH UR STRIP.AUTO: 5.5 [PH]
PLATELET # BLD AUTO: 268 THOUSANDS/UL (ref 149–390)
PMV BLD AUTO: 10.6 FL (ref 8.9–12.7)
POTASSIUM SERPL-SCNC: 3.7 MMOL/L (ref 3.5–5.3)
PROT SERPL-MCNC: 7.1 G/DL (ref 6.4–8.4)
PROT UR STRIP-MCNC: NEGATIVE MG/DL
RBC # BLD AUTO: 4.48 MILLION/UL (ref 3.81–5.12)
SODIUM SERPL-SCNC: 139 MMOL/L (ref 135–147)
SP GR UR STRIP.AUTO: 1.01 (ref 1–1.03)
UROBILINOGEN UR QL STRIP.AUTO: 0.2 E.U./DL
WBC # BLD AUTO: 9.91 THOUSAND/UL (ref 4.31–10.16)

## 2024-08-19 PROCEDURE — 99285 EMERGENCY DEPT VISIT HI MDM: CPT

## 2024-08-19 PROCEDURE — 81003 URINALYSIS AUTO W/O SCOPE: CPT | Performed by: STUDENT IN AN ORGANIZED HEALTH CARE EDUCATION/TRAINING PROGRAM

## 2024-08-19 PROCEDURE — 84484 ASSAY OF TROPONIN QUANT: CPT | Performed by: STUDENT IN AN ORGANIZED HEALTH CARE EDUCATION/TRAINING PROGRAM

## 2024-08-19 PROCEDURE — 71046 X-RAY EXAM CHEST 2 VIEWS: CPT

## 2024-08-19 PROCEDURE — 99285 EMERGENCY DEPT VISIT HI MDM: CPT | Performed by: STUDENT IN AN ORGANIZED HEALTH CARE EDUCATION/TRAINING PROGRAM

## 2024-08-19 PROCEDURE — 80053 COMPREHEN METABOLIC PANEL: CPT | Performed by: STUDENT IN AN ORGANIZED HEALTH CARE EDUCATION/TRAINING PROGRAM

## 2024-08-19 PROCEDURE — 96374 THER/PROPH/DIAG INJ IV PUSH: CPT

## 2024-08-19 PROCEDURE — 83690 ASSAY OF LIPASE: CPT | Performed by: STUDENT IN AN ORGANIZED HEALTH CARE EDUCATION/TRAINING PROGRAM

## 2024-08-19 PROCEDURE — 93005 ELECTROCARDIOGRAM TRACING: CPT

## 2024-08-19 PROCEDURE — 36415 COLL VENOUS BLD VENIPUNCTURE: CPT | Performed by: STUDENT IN AN ORGANIZED HEALTH CARE EDUCATION/TRAINING PROGRAM

## 2024-08-19 PROCEDURE — 85025 COMPLETE CBC W/AUTO DIFF WBC: CPT | Performed by: STUDENT IN AN ORGANIZED HEALTH CARE EDUCATION/TRAINING PROGRAM

## 2024-08-19 RX ORDER — METOPROLOL SUCCINATE 25 MG/1
25 TABLET, EXTENDED RELEASE ORAL DAILY
Qty: 21 TABLET | Refills: 0 | Status: SHIPPED | OUTPATIENT
Start: 2024-08-19

## 2024-08-19 RX ORDER — METOPROLOL SUCCINATE 25 MG/1
25 TABLET, EXTENDED RELEASE ORAL ONCE
Status: COMPLETED | OUTPATIENT
Start: 2024-08-19 | End: 2024-08-19

## 2024-08-19 RX ORDER — ONDANSETRON 4 MG/1
4 TABLET, ORALLY DISINTEGRATING ORAL EVERY 6 HOURS PRN
Qty: 20 TABLET | Refills: 0 | Status: SHIPPED | OUTPATIENT
Start: 2024-08-19

## 2024-08-19 RX ORDER — METOPROLOL SUCCINATE 25 MG/1
25 TABLET, EXTENDED RELEASE ORAL DAILY
Status: DISCONTINUED | OUTPATIENT
Start: 2024-08-20 | End: 2024-08-19

## 2024-08-19 RX ORDER — KETOROLAC TROMETHAMINE 30 MG/ML
15 INJECTION, SOLUTION INTRAMUSCULAR; INTRAVENOUS ONCE
Status: COMPLETED | OUTPATIENT
Start: 2024-08-19 | End: 2024-08-19

## 2024-08-19 RX ADMIN — METOPROLOL SUCCINATE 25 MG: 25 TABLET, EXTENDED RELEASE ORAL at 18:09

## 2024-08-19 RX ADMIN — KETOROLAC TROMETHAMINE 15 MG: 30 INJECTION, SOLUTION INTRAMUSCULAR; INTRAVENOUS at 16:32

## 2024-08-19 NOTE — ED PROVIDER NOTES
History  Chief Complaint   Patient presents with    Chest Pain     Pt presented to this ED from pcp office today c/o intermittent chest pain radiating to back w/headache, abdominal pain, nausea, and chills over past couple weeks. Pt instructed by pcp to come to ED per further evaluation.      54-year-old female.  Presents with left-sided chest discomfort/epigastric abdominal pain ongoing for the last few weeks. Having associated exertional dyspnea.  History of tobacco abuse--states that she smokes 3 cigarettes/day.  The patient also expresses that her abdominal discomfort exacerbated with chocolate.  Does not currently take a PPI/H2 blocker.  Denies nausea/vomiting, dark/tarry stools, rectal bleeding.      History provided by:  Patient and medical records  Chest Pain  Pain location:  L chest  Pain quality: aching, shooting and stabbing    Pain radiates to the back: yes    Pain severity:  Moderate  Onset quality:  Gradual  Timing:  Intermittent  Progression:  Worsening  Chronicity:  New  Associated symptoms: abdominal pain, back pain, cough, heartburn, nausea and shortness of breath    Associated symptoms: no dizziness, no fatigue, no fever, no headache, no near-syncope, no palpitations, not vomiting and no weakness      Prior to Admission Medications   Prescriptions Last Dose Informant Patient Reported? Taking?   ASCORBIC ACID PO   Yes No   Sig: Take by mouth   Ascorbic Acid, Vitamin C, (VITAMIN C) 100 MG tablet   Yes No   Sig: Take by mouth   Cholecalciferol 50 MCG (2000 UT) CAPS   Yes No   Sig: Take by mouth   HYDROcodone-Acetaminophen (LORCET 10/650 PO)   Yes No   Sig: Take by mouth   Patient not taking: Reported on 2/16/2023   MAGNESIUM-OXIDE PO   Yes No   Sig: Take by mouth   MULTIPLE VITAMINS PO   Yes No   Sig: Take by mouth   Omega-3 Fatty Acids (fish oil) 1,000 mg   Yes No   Sig: Take by mouth   Potassium Gluconate 2.5 MEQ TABS   Yes No   Sig: Take by mouth   Patient not taking: Reported on 1/4/2023    Potassium Gluconate 2.5 MEQ TABS   Yes No   Sig: Take 1 tablet by mouth daily   amLODIPine (NORVASC) 5 mg tablet   Yes No   Sig: Take 5 mg by mouth daily   Patient not taking: Reported on 2/7/2023   buPROPion (WELLBUTRIN SR) 150 mg 12 hr tablet   Yes No   Sig: One tablet once daily for 3 days, then increase to twice daily   cephalexin (KEFLEX) 500 mg capsule   Yes No   Sig: TAKE 1 CAPSULE BY MOUTH 4 TIMES DAILY FOR 5 DAYS.   Patient not taking: Reported on 2/16/2023   fish oil-omega-3 fatty acids 1000 MG capsule   Yes No   Sig: Take 1 capsule by mouth   Patient not taking: Reported on 2/16/2023   ibuprofen (MOTRIN) 200 mg tablet   Yes No   Sig: Take by mouth every 6 (six) hours as needed for mild pain   losartan (COZAAR) 100 MG tablet   Yes No   Sig: Take 100 mg by mouth daily   magnesium oxide (MAG-OX) 400 mg tablet   Yes No   Sig: Take by mouth   Patient not taking: Reported on 2/16/2023   oxybutynin (DITROPAN) 5 mg tablet   No No   Sig: Every 6-8 hours if needed for bladder pressure or spasm   Patient not taking: Reported on 2/16/2023   pregabalin (LYRICA) 150 mg capsule   Yes No   Sig: Take by mouth   Patient not taking: Reported on 2/16/2023   zinc gluconate 50 mg tablet   Yes No   Sig: Take 50 mg by mouth daily      Facility-Administered Medications: None       Past Medical History:   Diagnosis Date    Chronic bronchitis (HCC)     Fibromyalgia     Hypertension     Kidney stone     Lupus ([facility]) 2017    Migraines     Thoracic outlet syndrome        Past Surgical History:   Procedure Laterality Date    APPENDECTOMY      ENDOMETRIAL ABLATION      FL RETROGRADE PYELOGRAM  11/27/2022    FL RETROGRADE PYELOGRAM  2/14/2023    KNEE SURGERY      IN CYSTO BLADDER W/URETERAL CATHETERIZATION Right 11/26/2022    Procedure: CYSTOSCOPY RETROGRADE PYELOGRAM WITH INSERTION STENT URETERAL;  Surgeon: Dipti Novak MD;  [facility];  Service: Urology    IN CYSTO/URETERO W/LITHOTRIPSY &INDWELL STENT INSRT Right  2/14/2023    Procedure: CYSTO USCOPE W/  LASER, & STENT EXCHANGE;  Surgeon: Nikolas Renteria MD;  [facility]: [facility];  Service: Urology    THORACIC SYMPATHETECTOMY         Family History   Problem Relation Age of Onset    Osteoporosis Mother     Cancer Father      I have reviewed and agree with the history as documented.    E-Cigarette/Vaping    E-Cigarette Use Never User      E-Cigarette/Vaping Substances    Nicotine No     THC No     CBD No     Flavoring No     Other No     Unknown No      Social History     Tobacco Use    Smoking status: Every Day     Current packs/day: 0.25     Average packs/day: 0.3 packs/day for 20.0 years (5.0 ttl pk-yrs)     Types: Cigarettes    Smokeless tobacco: Never    Tobacco comments:     Last cigarette 2/14/23 1000, last marijuana 4 days ago   Vaping Use    Vaping status: Never Used   Substance Use Topics    Alcohol use: Yes     Alcohol/week: 5.0 standard drinks of alcohol     Types: 5 Standard drinks or equivalent per week     Comment: Social    Drug use: Yes     Frequency: 2.0 times per week     Types: Marijuana     Comment: Marijuana tincture- has medical marijuana card       Review of Systems   Constitutional:  Negative for activity change, appetite change, fatigue, fever and unexpected weight change.   Respiratory:  Positive for cough, chest tightness and shortness of breath. Negative for wheezing.    Cardiovascular:  Positive for chest pain. Negative for palpitations and near-syncope.   Gastrointestinal:  Positive for abdominal pain, heartburn and nausea. Negative for anal bleeding, blood in stool, diarrhea and vomiting.   Musculoskeletal:  Positive for back pain. Negative for myalgias.   Neurological:  Negative for dizziness, weakness, light-headedness and headaches.   All other systems reviewed and are negative.      Physical Exam  Physical Exam  Vitals and nursing note reviewed.   Constitutional:       General: She is not in acute distress.     Appearance: She is not  ill-appearing or toxic-appearing.   HENT:      Head: Normocephalic and atraumatic.   Eyes:      Extraocular Movements: Extraocular movements intact.   Cardiovascular:      Rate and Rhythm: Normal rate.      Heart sounds: Normal heart sounds. No murmur heard.  Pulmonary:      Breath sounds: Normal breath sounds. No decreased breath sounds, wheezing, rhonchi or rales.   Chest:      Chest wall: Tenderness present.   Abdominal:      General: Bowel sounds are normal.      Palpations: Abdomen is soft.      Tenderness: There is abdominal tenderness. There is no guarding or rebound.      Comments: Mild tenderness to palpation along the epigastrium   Musculoskeletal:      Cervical back: Neck supple.      Right lower leg: No tenderness. No edema.      Left lower leg: No tenderness. No edema.   Skin:     General: Skin is warm and dry.      Coloration: Skin is not cyanotic.      Findings: No ecchymosis or erythema.   Neurological:      General: No focal deficit present.      Mental Status: She is alert and oriented to person, place, and time.      Cranial Nerves: No cranial nerve deficit.      Motor: No weakness.   Psychiatric:         Mood and Affect: Mood normal. Mood is not anxious.         Behavior: Behavior normal.         Vital Signs  ED Triage Vitals [08/19/24 1359]   Temperature Pulse Respirations Blood Pressure SpO2   98.4 °F (36.9 °C) 71 18 (!) 159/103 99 %      Temp src Heart Rate Source Patient Position - Orthostatic VS BP Location FiO2 (%)   -- Monitor Lying Left arm --      Pain Score       8         Vitals:    08/19/24 1702 08/19/24 1745 08/19/24 1800 08/19/24 1809   BP: 124/88 143/75 153/67 153/67   Pulse: 72 68 77 77   Patient Position - Orthostatic VS: Lying Sitting Sitting      ED Medications  Medications   ketorolac (TORADOL) injection 15 mg (15 mg Intravenous Given 8/19/24 1632)   metoprolol succinate (TOPROL-XL) 24 hr tablet 25 mg (25 mg Oral Given 8/19/24 1809)     Diagnostic Studies  Results Reviewed        Procedure Component Value Units Date/Time    UA w Reflex to Microscopic w Reflex to Culture [[id number]] Collected: 08/19/24 1702    Lab Status: Final result Specimen: Urine, Clean Catch Updated: 08/19/24 1711     Color, UA Yellow     Clarity, UA Clear     Specific Gravity, UA 1.010     pH, UA 5.5     Leukocytes, UA Negative     Nitrite, UA Negative     Protein, UA Negative mg/dl      Glucose, UA Negative mg/dl      Ketones, UA Negative mg/dl      Urobilinogen, UA 0.2 E.U./dl      Bilirubin, UA Negative     Occult Blood, UA Negative    High Sensitivity Troponin I Random [[id number]]  (Abnormal) Collected: 08/19/24 1629    Lab Status: Final result Specimen: Blood from Arm, Left Updated: 08/19/24 1659     HS TnI random 3 ng/L     Comprehensive metabolic panel [[id number]] Collected: 08/19/24 1629    Lab Status: Final result Specimen: Blood from Arm, Left Updated: 08/19/24 1657     Sodium 139 mmol/L      Potassium 3.7 mmol/L      Chloride 106 mmol/L      CO2 26 mmol/L      ANION GAP 7 mmol/L      BUN 13 mg/dL      Creatinine 0.84 mg/dL      Glucose 91 mg/dL      Calcium 9.5 mg/dL      AST 21 U/L      ALT 14 U/L      Alkaline Phosphatase 90 U/L      Total Protein 7.1 g/dL      Albumin 4.4 g/dL      Total Bilirubin 0.80 mg/dL      eGFR 79 ml/min/1.73sq m     Narrative:      National Kidney Disease Foundation guidelines for Chronic Kidney Disease (CKD):     Stage 1 with normal or high GFR (GFR > 90 mL/min/1.73 square meters)    Stage 2 Mild CKD (GFR = 60-89 mL/min/1.73 square meters)    Stage 3A Moderate CKD (GFR = 45-59 mL/min/1.73 square meters)    Stage 3B Moderate CKD (GFR = 30-44 mL/min/1.73 square meters)    Stage 4 Severe CKD (GFR = 15-29 mL/min/1.73 square meters)    Stage 5 End Stage CKD (GFR <15 mL/min/1.73 square meters)  Note: GFR calculation is accurate only with a steady state creatinine    Lipase [[id number]]  (Normal) Collected: 08/19/24 1629    Lab Status: Final result Specimen: Blood from Arm, Left  Updated: 08/19/24 1657     Lipase 41 u/L     CBC and differential [[id number]] Collected: 08/19/24 1629    Lab Status: Final result Specimen: Blood from Arm, Left Updated: 08/19/24 1634     WBC 9.91 Thousand/uL      RBC 4.48 Million/uL      Hemoglobin 14.2 g/dL      Hematocrit 42.1 %      MCV 94 fL      MCH 31.7 pg      MCHC 33.7 g/dL      RDW 12.5 %      MPV 10.6 fL      Platelets 268 Thousands/uL      nRBC 0 /100 WBCs      Segmented % 64 %      Immature Grans % 0 %      Lymphocytes % 26 %      Monocytes % 7 %      Eosinophils Relative 2 %      Basophils Relative 1 %      Absolute Neutrophils 6.36 Thousands/µL      Absolute Immature Grans 0.03 Thousand/uL      Absolute Lymphocytes 2.59 Thousands/µL      Absolute Monocytes 0.66 Thousand/µL      Eosinophils Absolute 0.21 Thousand/µL      Basophils Absolute 0.06 Thousands/µL                XR chest 2 views   ED Interpretation by Michael Perez DO (08/19 1727)   No acute findings.               Procedures  Procedures     ED Course  ED Course as of 08/19/24 2248   Mon Aug 19, 2024   1630 Vital signs reviewed.  Patient expresses intermittent/worsening left-sided chest pain/epigastric abdominal pain for the last few weeks. Having associated dyspepsia, acid reflux.  Referred to the emergency department by her PCP.  Will obtain labs, chest x-ray, ECG.   1634 ECG interpreted by me.  Normal sinus rhythm with occasional PVCs.  Heart rate 76 bpm.  Normal axis.  No acute ischemic changes.  There are no available ECGs to compare.   1727 Chest x-ray interpreted by me.  No acute findings.   1748 While in the emergency department, the patient has been having frequent PVCs.  Bigeminy versus trigeminy.  The case was discussed with on-call cardiology.  Okay to start metoprolol succinate 25 mg daily.  Will require outpatient echocardiogram and Zio patch. Given the symptoms of acid reflux, the patient was instructed to start a course of Pepcid twice daily. PCP follow up encouraged.   Other recommendation/strict return precautions were discussed with the patient.  She expressed understanding.  All questions addressed.  Stable for discharge.     SBIRT 20yo+      Flowsheet Row Most Recent Value   Initial Alcohol Screen: US AUDIT-C     1. How often do you have a drink containing alcohol? 0 Filed at: 08/19/2024 1559   2. How many drinks containing alcohol do you have on a typical day you are drinking?  0 Filed at: 08/19/2024 1559   3a. Male UNDER 65: How often do you have five or more drinks on one occasion? 0 Filed at: 08/19/2024 1559   3b. FEMALE Any Age, or MALE 65+: How often do you have 4 or more drinks on one occassion? 0 Filed at: 08/19/2024 1559   Audit-C Score 0 Filed at: 08/19/2024 1559   PUSHPA: How many times in the past year have you...    Used an illegal drug or used a prescription medication for non-medical reasons? Never Filed at: 08/19/2024 1559          Medical Decision Making  This patient presents with chest discomfort, epigastric abdominal pain.   Diagnostic considerations include ACS, PE, pneumonia, pancreatitis, cholecystitis, cholelithiasis. See ED Course.         Problems Addressed:  Chest discomfort: acute illness or injury  Dyspepsia: acute illness or injury  Epigastric abdominal pain: acute illness or injury  Frequent PVCs: acute illness or injury  Nausea: acute illness or injury  Palpitations: acute illness or injury    Amount and/or Complexity of Data Reviewed  Labs: ordered. Decision-making details documented in ED Course.  Radiology: ordered and independent interpretation performed. Decision-making details documented in ED Course.  ECG/medicine tests: ordered and independent interpretation performed. Decision-making details documented in ED Course.  Discussion of management or test interpretation with external provider(s): The case and treatment plan were discussed with cardiology    Risk  Prescription drug management.      Disposition  Final diagnoses:   Chest  discomfort   Palpitations   Epigastric abdominal pain   Dyspepsia   Frequent PVCs   Nausea     Time reflects when diagnosis was documented in both MDM as applicable and the Disposition within this note       Time User Action Codes Description Comment    8/19/2024  5:59 PM Halupa, Michael Add [R07.89] Chest discomfort     8/19/2024  5:59 PM Halupa, Michael Add [R00.2] Palpitations     8/19/2024  6:00 PM Halupa, Michael Add [R10.13] Epigastric abdominal pain     8/19/2024  6:00 PM Halupa, Michael Add [R10.13] Dyspepsia     8/19/2024  6:01 PM Halupa, Michael Add [I49.3] Frequent PVCs     8/19/2024  6:01 PM Halupa, Michael Modify [R07.89] Chest discomfort     8/19/2024  6:01 PM Halupa, Michael Modify [I49.3] Frequent PVCs     8/19/2024  6:14 PM Halupa, Michael Add [R11.0] Nausea           ED Disposition       ED Disposition   Discharge    Condition   Stable    Date/Time   Mon Aug 19, 2024 8764    Comment   Ayde Ana María discharge to home/self care.                   Follow-up Information       Follow up With Specialties Details Why Contact Info    Logan Romero MD Cardiology In 1 week  [address] [facility] [id number] [phone]              Discharge Medication List as of 8/19/2024  6:04 PM        START taking these medications    Details   metoprolol succinate (TOPROL-XL) 25 mg 24 hr tablet Take 1 tablet (25 mg total) by mouth daily, Starting Mon 8/19/2024, Normal           CONTINUE these medications which have NOT CHANGED    Details   amLODIPine (NORVASC) 5 mg tablet Take 5 mg by mouth daily, Starting u 5/13/2021, Historical Med      !! ASCORBIC ACID PO Take by mouth, Historical Med      !! Ascorbic Acid, Vitamin C, (VITAMIN C) 100 MG tablet Take by mouth, Historical Med      buPROPion (WELLBUTRIN SR) 150 mg 12 hr tablet One tablet once daily for 3 days, then increase to twice daily, Historical Med      cephalexin (KEFLEX) 500 mg capsule TAKE 1 CAPSULE BY MOUTH 4 TIMES DAILY FOR 5 DAYS.,  Historical Med      Cholecalciferol 50 MCG (2000 UT) CAPS Take by mouth, Historical Med      !! fish oil-omega-3 fatty acids 1000 MG capsule Take 1 capsule by mouth, Historical Med      HYDROcodone-Acetaminophen (LORCET 10/650 PO) Take by mouth, Historical Med      ibuprofen (MOTRIN) 200 mg tablet Take by mouth every 6 (six) hours as needed for mild pain, Historical Med      losartan (COZAAR) 100 MG tablet Take 100 mg by mouth daily, Starting Tue 9/20/2022, Historical Med      magnesium oxide (MAG-OX) 400 mg tablet Take by mouth, Historical Med      MAGNESIUM-OXIDE PO Take by mouth, Historical Med      MULTIPLE VITAMINS PO Take by mouth, Historical Med      !! Omega-3 Fatty Acids (fish oil) 1,000 mg Take by mouth, Historical Med      oxybutynin (DITROPAN) 5 mg tablet Every 6-8 hours if needed for bladder pressure or spasm, Normal      !! Potassium Gluconate 2.5 MEQ TABS Take by mouth, Historical Med      !! Potassium Gluconate 2.5 MEQ TABS Take 1 tablet by mouth daily, Historical Med      pregabalin (LYRICA) 150 mg capsule Take by mouth, Historical Med      zinc gluconate 50 mg tablet Take 50 mg by mouth daily, Historical Med       !! - Potential duplicate medications found. Please discuss with provider.              PDMP Review         Value Time User    PDMP Reviewed  Yes 11/27/2022  3:15 PM Lupis Baker DO            ED Provider  Electronically Signed by             Michael Perez DO  08/19/24 3722

## 2024-08-19 NOTE — DISCHARGE INSTRUCTIONS
You are being prescribed a course of metoprolol to help control your palpitations, PVCs.  Please take as directed. Take this medication in the evening.     Start taking a course of Pepcid 20 mg twice daily to help control your acid reflux, upper abdominal pain.  Refrain from foods such as chocolate,  highly acidic foods, excessive caffeine/alcohol use, cigarette smoking, spicy foods as these foods may exacerbate your symptoms.    An outpatient referral to cardiology and family medicine was provided.  Expect a phone call within the next few days to set up the appointment.  Return to the emergency department for any concerning signs or symptoms.

## 2024-08-20 LAB
ATRIAL RATE: 76 BPM
PR INTERVAL: 134 MS
QRS AXIS: 32 DEGREES
QRSD INTERVAL: 78 MS
QT INTERVAL: 388 MS
QTC INTERVAL: 436 MS
T WAVE AXIS: 29 DEGREES
VENTRICULAR RATE: 76 BPM

## 2024-08-23 ENCOUNTER — TELEPHONE (OUTPATIENT)
Dept: ADMINISTRATIVE | Facility: OTHER | Age: 54
End: 2024-08-23

## 2024-08-23 NOTE — TELEPHONE ENCOUNTER
----- Message from Flora ALVARADO sent at 8/22/2024  2:14 PM EDT -----  Regarding: Care Gap request  08/22/24 2:14 PM    Hello, our patient attached above has had HIV completed/performed. Please assist in updating the patient chart by pulling the Care Everywhere (CE) document. The date of service is 2023.     Hello, our patient attached above has had Hepatitis C completed/performed. Please assist in updating the patient chart by pulling the Care Everywhere (CE) document. The date of service is 2023.     Thank you,  Flora Flaherty  [facility]

## 2024-08-23 NOTE — TELEPHONE ENCOUNTER
Upon review of the In Basket request we were able to locate, review, and update the patient chart as requested for CRC: Colonoscopy, Hepatitis C , and HIV.    Any additional questions or concerns should be emailed to the Practice Liaisons via the appropriate education email address, please do not reply via In Basket.    Thank you  GIOVANNI CEJA MA   [facility]

## 2024-08-23 NOTE — TELEPHONE ENCOUNTER
----- Message from Flora ALVARADO sent at 8/22/2024  2:08 PM EDT -----  Regarding: Care Gap request  08/22/24 2:08 PM    Hello, our patient attached above has had CRC: Colonoscopy completed/performed. Please assist in updating the patient chart by pulling the Care Everywhere (CE) document. The date of service is 2021.     Thank you,  Flora Flaherty  [facility] PRIMARY [facility]

## 2024-08-27 ENCOUNTER — OFFICE VISIT (OUTPATIENT)
Dept: FAMILY MEDICINE CLINIC | Facility: CLINIC | Age: 54
End: 2024-08-27
Payer: COMMERCIAL

## 2024-08-27 VITALS
WEIGHT: 154 LBS | HEART RATE: 68 BPM | HEIGHT: 61 IN | DIASTOLIC BLOOD PRESSURE: 84 MMHG | BODY MASS INDEX: 29.07 KG/M2 | SYSTOLIC BLOOD PRESSURE: 120 MMHG | OXYGEN SATURATION: 98 %

## 2024-08-27 DIAGNOSIS — M54.9 BACK PAIN, UNSPECIFIED BACK LOCATION, UNSPECIFIED BACK PAIN LATERALITY, UNSPECIFIED CHRONICITY: ICD-10-CM

## 2024-08-27 DIAGNOSIS — K21.9 GASTROESOPHAGEAL REFLUX DISEASE WITHOUT ESOPHAGITIS: ICD-10-CM

## 2024-08-27 DIAGNOSIS — I49.3 PVC (PREMATURE VENTRICULAR CONTRACTION): ICD-10-CM

## 2024-08-27 DIAGNOSIS — E03.9 HYPOTHYROIDISM, UNSPECIFIED TYPE: ICD-10-CM

## 2024-08-27 DIAGNOSIS — M79.7 FIBROMYALGIA: ICD-10-CM

## 2024-08-27 DIAGNOSIS — Z12.31 ENCOUNTER FOR SCREENING MAMMOGRAM FOR MALIGNANT NEOPLASM OF BREAST: ICD-10-CM

## 2024-08-27 DIAGNOSIS — Z00.00 ANNUAL PHYSICAL EXAM: Primary | ICD-10-CM

## 2024-08-27 PROCEDURE — 99214 OFFICE O/P EST MOD 30 MIN: CPT | Performed by: FAMILY MEDICINE

## 2024-08-27 PROCEDURE — 99386 PREV VISIT NEW AGE 40-64: CPT | Performed by: FAMILY MEDICINE

## 2024-08-27 RX ORDER — LEVOTHYROXINE SODIUM 25 UG/1
1 TABLET ORAL
COMMUNITY
Start: 2024-04-15

## 2024-08-27 RX ORDER — DULOXETIN HYDROCHLORIDE 20 MG/1
20 CAPSULE, DELAYED RELEASE ORAL DAILY
Qty: 30 CAPSULE | Refills: 1 | Status: SHIPPED | OUTPATIENT
Start: 2024-08-27

## 2024-08-27 RX ORDER — HYDROCODONE BITARTRATE AND ACETAMINOPHEN 5; 325 MG/1; MG/1
1 TABLET ORAL EVERY 6 HOURS PRN
Qty: 20 TABLET | Refills: 0 | Status: SHIPPED | OUTPATIENT
Start: 2024-08-27

## 2024-08-27 NOTE — PROGRESS NOTES
Assessment/Plan:       {There are no diagnoses linked to this encounter. (Refresh or delete this SmartLink)}      Subjective:      Patient ID: Ayde Gotti is a 54 y.o. female.    HPI    The following portions of the patient's history were reviewed and updated as appropriate: allergies, current medications, past family history, past medical history, past social history, past surgical history, and problem list.    Review of Systems      Objective:      There were no vitals taken for this visit.         Physical Exam

## 2024-08-27 NOTE — PROGRESS NOTES
Assessment/Plan:       1. Annual physical exam  Assessment & Plan:  Reviewed age-appropriate health maintenance and preventive care.    2. PVC (premature ventricular contraction)  Comments:  continue metoprolol  she will see cardiology  echo ordered  Orders:  -     Echo complete w/ contrast if indicated; Future; Expected date: 08/27/2024  3. Encounter for screening mammogram for malignant neoplasm of breast  Comments:  mammogram ordered  Orders:  -     Mammo screening bilateral w 3d & cad; Future; Expected date: 08/27/2024  4. Gastroesophageal reflux disease without esophagitis  -     omeprazole (PriLOSEC) 20 mg delayed release capsule; Take 1 capsule (20 mg total) by mouth daily  5. Fibromyalgia  Comments:  start cymbalta  Orders:  -     DULoxetine (CYMBALTA) 20 mg capsule; Take 1 capsule (20 mg total) by mouth daily        Subjective:      Patient ID: Ayde Gotti is a 54 y.o. female.    The patient is here for her initial visit.  She is a healthy 54-year-old woman.  She has a history of hypertension as well as fibromyalgia.  Her blood pressure stable and well-controlled.  She is trying to quit smoking and is on Wellbutrin for that.  She would like something for her fibromyalgia.  She has pain in her right upper back region and chronic daily headaches.  She was to the ER recently with chronic left-sided abdominal pain.  She has some epigastric discomfort.  Blood work was negative scan was negative she had an MRI recently to follow-up on a pancreatic cyst but nothing else was seen at that time.  She routinely was taking NSAIDs for her fibro pain.  She probably has some gastritis.  She has no blood in her stool or black stool.  I am going to start her on a proton pump inhibitor.  She is due for mammogram as well.  Chronically she does have THC use.  No drug use.  Minimal alcohol.  She does understand the importance of healthy diet and exercise.  She does have a gynecologist.        The following portions of the  "patient's history were reviewed and updated as appropriate: allergies, current medications, past family history, past medical history, past social history, past surgical history, and problem list.    Review of Systems   Respiratory: Negative.     Cardiovascular: Negative.    Gastrointestinal: Negative.          Objective:      /84 (BP Location: Left arm, Patient Position: Sitting, Cuff Size: Large)   Pulse 68   Ht 5' 1\" (1.549 m)   Wt 69.9 kg (154 lb)   SpO2 98%   BMI 29.10 kg/m²          Physical Exam  Vitals and nursing note reviewed.   Constitutional:       Appearance: Normal appearance.   HENT:      Head: Normocephalic and atraumatic.      Nose: Nose normal.      Mouth/Throat:      Mouth: Mucous membranes are moist.   Eyes:      Extraocular Movements: Extraocular movements intact.      Pupils: Pupils are equal, round, and reactive to light.   Cardiovascular:      Rate and Rhythm: Normal rate and regular rhythm.      Pulses: Normal pulses.   Pulmonary:      Effort: Pulmonary effort is normal.      Breath sounds: Normal breath sounds.   Abdominal:      General: Bowel sounds are normal.      Palpations: Abdomen is soft.   Musculoskeletal:      Cervical back: Normal range of motion.   Skin:     General: Skin is warm and dry.      Capillary Refill: Capillary refill takes less than 2 seconds.   Neurological:      General: No focal deficit present.      Mental Status: She is alert.   Psychiatric:         Mood and Affect: Mood normal.         "

## 2024-09-10 ENCOUNTER — HOSPITAL ENCOUNTER (OUTPATIENT)
Dept: NON INVASIVE DIAGNOSTICS | Facility: HOSPITAL | Age: 54
Discharge: HOME | End: 2024-09-10
Attending: FAMILY MEDICINE
Payer: COMMERCIAL

## 2024-09-10 ENCOUNTER — APPOINTMENT (OUTPATIENT)
Dept: LAB | Facility: HOSPITAL | Age: 54
End: 2024-09-10
Payer: COMMERCIAL

## 2024-09-10 VITALS
HEART RATE: 70 BPM | BODY MASS INDEX: 29.07 KG/M2 | SYSTOLIC BLOOD PRESSURE: 120 MMHG | HEIGHT: 61 IN | WEIGHT: 154 LBS | DIASTOLIC BLOOD PRESSURE: 84 MMHG

## 2024-09-10 DIAGNOSIS — E03.9 HYPOTHYROIDISM, UNSPECIFIED TYPE: ICD-10-CM

## 2024-09-10 DIAGNOSIS — I49.3 PVC (PREMATURE VENTRICULAR CONTRACTION): ICD-10-CM

## 2024-09-10 LAB
AORTIC ROOT: 3.5 CM
AORTIC VALVE MEAN VELOCITY: 9.1 M/S
APICAL FOUR CHAMBER EJECTION FRACTION: 56 %
AV AREA BY CONTINUOUS VTI: 1.9 CM2
AV AREA PEAK VELOCITY: 2 CM2
AV LVOT MEAN GRADIENT: 2 MMHG
AV LVOT PEAK GRADIENT: 3 MMHG
AV MEAN GRADIENT: 4 MMHG
AV PEAK GRADIENT: 5 MMHG
AV VALVE AREA: 1.87 CM2
AV VELOCITY RATIO: 0.79
BSA FOR ECHO PROCEDURE: 1.69 M2
DOP CALC AO PEAK VEL: 1.15 M/S
DOP CALC AO VTI: 28.1 CM
DOP CALC LVOT AREA: 2.54 CM2
DOP CALC LVOT CARDIAC INDEX: 2.09 L/MIN/M2
DOP CALC LVOT CARDIAC OUTPUT: 3.53 L/MIN
DOP CALC LVOT DIAMETER: 1.8 CM
DOP CALC LVOT PEAK VEL VTI: 20.7 CM
DOP CALC LVOT PEAK VEL: 0.91 M/S
DOP CALC LVOT STROKE INDEX: 30.8 ML/M2
DOP CALC LVOT STROKE VOLUME: 52.65
E WAVE DECELERATION TIME: 179 MS
E/A RATIO: 0.93
FRACTIONAL SHORTENING: 29 (ref 28–44)
INTERVENTRICULAR SEPTUM IN DIASTOLE (PARASTERNAL SHORT AXIS VIEW): 1 CM
INTERVENTRICULAR SEPTUM: 1.2 CM (ref 0.6–1.1)
LAAS-AP2: 14.8 CM2
LAAS-AP4: 12.3 CM2
LEFT ATRIUM AREA SYSTOLE SINGLE PLANE A4C: 12.6 CM2
LEFT ATRIUM SIZE: 3 CM
LEFT ATRIUM VOLUME (MOD BIPLANE): 32 ML
LEFT ATRIUM VOLUME INDEX (MOD BIPLANE): 18.9 ML/M2
LEFT INTERNAL DIMENSION IN SYSTOLE: 3.2 CM (ref 2.1–4)
LEFT VENTRICLE DIASTOLIC VOLUME (MOD BIPLANE): 101 ML
LEFT VENTRICLE DIASTOLIC VOLUME INDEX (MOD BIPLANE): 59.8 ML/M2
LEFT VENTRICLE SYSTOLIC VOLUME (MOD BIPLANE): 43 ML
LEFT VENTRICLE SYSTOLIC VOLUME INDEX (MOD BIPLANE): 25.4 ML/M2
LEFT VENTRICULAR INTERNAL DIMENSION IN DIASTOLE: 4.5 CM (ref 3.5–6)
LEFT VENTRICULAR POSTERIOR WALL IN END DIASTOLE: 1 CM
LEFT VENTRICULAR STROKE VOLUME: 50 ML
LV EF: 57 %
LVSV (TEICH): 50 ML
MV PEAK A VEL: 1.03 M/S
MV PEAK E VEL: 96 CM/S
MV STENOSIS PRESSURE HALF TIME: 52 MS
MV VALVE AREA P 1/2 METHOD: 4.23
RIGHT ATRIUM AREA SYSTOLE A4C: 13.7 CM2
RIGHT VENTRICLE ID DIMENSION: 3.3 CM
SL CV LEFT ATRIUM LENGTH A2C: 4.5 CM
SL CV PED ECHO LEFT VENTRICLE DIASTOLIC VOLUME (MOD BIPLANE) 2D: 91 ML
SL CV PED ECHO LEFT VENTRICLE SYSTOLIC VOLUME (MOD BIPLANE) 2D: 40 ML
TR MAX PG: 18 MMHG
TR PEAK VELOCITY: 2.1 M/S
TRICUSPID ANNULAR PLANE SYSTOLIC EXCURSION: 2.4 CM
TRICUSPID VALVE PEAK REGURGITATION VELOCITY: 2.12 M/S
TSH SERPL DL<=0.05 MIU/L-ACNC: 1.83 UIU/ML (ref 0.45–4.5)

## 2024-09-10 PROCEDURE — 36415 COLL VENOUS BLD VENIPUNCTURE: CPT

## 2024-09-10 PROCEDURE — 93306 TTE W/DOPPLER COMPLETE: CPT | Performed by: INTERNAL MEDICINE

## 2024-09-10 PROCEDURE — 93306 TTE W/DOPPLER COMPLETE: CPT

## 2024-09-10 PROCEDURE — 84443 ASSAY THYROID STIM HORMONE: CPT

## 2024-09-19 ENCOUNTER — CONSULT (OUTPATIENT)
Dept: CARDIOLOGY CLINIC | Facility: CLINIC | Age: 54
End: 2024-09-19
Payer: COMMERCIAL

## 2024-09-19 VITALS
OXYGEN SATURATION: 95 % | WEIGHT: 150 LBS | SYSTOLIC BLOOD PRESSURE: 132 MMHG | HEIGHT: 61 IN | BODY MASS INDEX: 28.32 KG/M2 | DIASTOLIC BLOOD PRESSURE: 80 MMHG | HEART RATE: 76 BPM

## 2024-09-19 DIAGNOSIS — R00.2 PALPITATIONS: Primary | ICD-10-CM

## 2024-09-19 DIAGNOSIS — Z72.0 TOBACCO ABUSE: ICD-10-CM

## 2024-09-19 DIAGNOSIS — I49.3 FREQUENT PVCS: ICD-10-CM

## 2024-09-19 DIAGNOSIS — I10 PRIMARY HYPERTENSION: ICD-10-CM

## 2024-09-19 DIAGNOSIS — R07.9 CHEST PAIN, UNSPECIFIED TYPE: ICD-10-CM

## 2024-09-19 DIAGNOSIS — M79.7 FIBROMYALGIA: ICD-10-CM

## 2024-09-19 PROCEDURE — 99204 OFFICE O/P NEW MOD 45 MIN: CPT | Performed by: INTERNAL MEDICINE

## 2024-09-19 RX ORDER — DULOXETIN HYDROCHLORIDE 20 MG/1
20 CAPSULE, DELAYED RELEASE ORAL DAILY
Qty: 90 CAPSULE | Refills: 1 | Status: SHIPPED | OUTPATIENT
Start: 2024-09-19 | End: 2024-09-24

## 2024-09-19 NOTE — PROGRESS NOTES
[facility] [place]  [address]  [address]  [facility] [id number] [phone]      Patient name: Ayde Gotti   YOB: 1970   MR no: [id number]        Diagnosis ICD-10-CM Associated Orders   1. Palpitations  R00.2       2. Frequent PVCs  I49.3 Ambulatory Referral to Cardiology      3. Primary hypertension  I10       4. Chest pain, unspecified type  R07.9       5. Tobacco abuse  Z72.0            Assessment and Recommendations:    1. Palpitations  2. Frequent PVCs  Assessment & Plan:  Even though she was noted to have frequent PVCs on her recent echocardiogram from September 2024, her left ventricular systolic function was normal, she did have mild concentric left ventricle hypertrophy and mild aortic sclerosis and mild mitral regurgitation.  I will recommend a repeat imaging in 3 years.  I agree with low-dose metoprolol which has recently been started.  We also discussed the mechanism for PVCs including stimulants, anxiety and stress etc.  On her recent echocardiogram, she was also noted to have interatrial septal aneurysm without any obvious shunting.  At this point it seems to be an incidental finding with no clinical implications.  Orders:  -     Ambulatory Referral to Cardiology  3. Primary hypertension  Assessment & Plan:  Blood pressure is fairly well-controlled on current medications.  I agree with continuing the same strategy.  Target blood pressure is less than 130/80.  4. Chest pain, unspecified type  Assessment & Plan:  Her chest pain symptoms are quite atypical.  Her lipid profile looks quite fair.  I did discuss the possibility of a plain treadmill stress test with the understanding that there is a higher incidence of false positive results in females.  In that case, we may have to proceed with a nuclear stress test for further clarification.  She is comfortable with clinical follow-up for now and will reach out to my office if  her symptoms change.  5. Tobacco abuse  Assessment & Plan:  Patient states that she is down to only 3 cigarettes for the last several months and trying her best to completely stop which would be the best case scenario.         CHIEF COMPLAINT:  PVCs, interatrial septal aneurysm, atypical chest pain    HPI:  54-year-old female with past medical history significant for hypertension, palpitations secondary to PVCs, chronic atypical chest pain, fibromyalgia, presents for first cardiology visit.  Patient reports long history of intermittent, brief palpitations.  She also reports very atypical, brief left-sided chest pain which she describes as sharp electrical current feelings that occur randomly.  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  She does admit to mild chronic dyspnea on exertion but no syncope.    Past Medical History:   Diagnosis Date    Chronic bronchitis (HCC)     Fibromyalgia     Hypertension     Kidney stone     Lupus (HCC) 2017    Migraines     Thoracic outlet syndrome         Past Surgical History:   Procedure Laterality Date    APPENDECTOMY      ENDOMETRIAL ABLATION      FL RETROGRADE PYELOGRAM  11/27/2022    FL RETROGRADE PYELOGRAM  2/14/2023    KNEE SURGERY      NH CYSTO BLADDER W/URETERAL CATHETERIZATION Right 11/26/2022    Procedure: CYSTOSCOPY RETROGRADE PYELOGRAM WITH INSERTION STENT URETERAL;  Surgeon: Dipti Novak MD;  Location: [facility];  Service: Urology    NH CYSTO/URETERO W/LITHOTRIPSY &INDWELL STENT INSRT Right 2/14/2023    Procedure: CYSTO USCOPE W/  LASER, & STENT EXCHANGE;  Surgeon: Nikolas Renteria MD;  Location: [facility];  Service: Urology    THORACIC SYMPATHETECTOMY         Social History     Tobacco Use    Smoking status: Every Day     Current packs/day: 0.25     Average packs/day: 0.3 packs/day for 20.0 years (5.0 ttl pk-yrs)     Types: Cigarettes     Passive exposure: Current    Smokeless  tobacco: Never    Tobacco comments:     Last cigarette 2/14/23 1000, last marijuana 4 days ago   Vaping Use    Vaping status: Never Used   Substance Use Topics    Alcohol use: Yes     Alcohol/week: 5.0 standard drinks of alcohol     Types: 5 Standard drinks or equivalent per week     Comment: Social    Drug use: Yes     Frequency: 2.0 times per week     Types: Marijuana     Comment: Marijuana tincture- has medical marijuana card       Family History   Problem Relation Age of Onset    Osteoporosis Mother     Cancer Father         Allergies   Allergen Reactions    Avocado (Diagnostic) - Food Allergy Other (See Comments)         Current Outpatient Medications:     ASCORBIC ACID PO, Take by mouth, Disp: , Rfl:     Ascorbic Acid, Vitamin C, (VITAMIN C) 100 MG tablet, Take by mouth, Disp: , Rfl:     buPROPion (WELLBUTRIN SR) 150 mg 12 hr tablet, One tablet once daily for 3 days, then increase to twice daily, Disp: , Rfl:     Cholecalciferol 50 MCG (2000 UT) CAPS, Take by mouth, Disp: , Rfl:     DULoxetine (CYMBALTA) 20 mg capsule, Take 1 capsule (20 mg total) by mouth daily, Disp: 30 capsule, Rfl: 1    HYDROcodone-acetaminophen (Norco) 5-325 mg per tablet, Take 1 tablet by mouth every 6 (six) hours as needed for pain Max Daily Amount: 4 tablets, Disp: 20 tablet, Rfl: 0    ibuprofen (MOTRIN) 200 mg tablet, Take by mouth every 6 (six) hours as needed for mild pain, Disp: , Rfl:     levothyroxine 25 mcg tablet, Take 1 tablet by mouth daily before breakfast, Disp: , Rfl:     losartan (COZAAR) 100 MG tablet, Take 100 mg by mouth daily, Disp: , Rfl:     MAGNESIUM-OXIDE PO, Take by mouth, Disp: , Rfl:     metoprolol succinate (TOPROL-XL) 25 mg 24 hr tablet, Take 1 tablet (25 mg total) by mouth daily, Disp: 21 tablet, Rfl: 0    MULTIPLE VITAMINS PO, Take by mouth, Disp: , Rfl:     NON FORMULARY, Medical Marijuana, Disp: , Rfl:     Omega-3 Fatty Acids (fish oil) 1,000 mg, Take by mouth, Disp: , Rfl:     omeprazole (PriLOSEC) 20 mg  "delayed release capsule, Take 1 capsule (20 mg total) by mouth daily, Disp: 90 capsule, Rfl: 3    ondansetron (ZOFRAN-ODT) 4 mg disintegrating tablet, Take 1 tablet (4 mg total) by mouth every 6 (six) hours as needed for nausea or vomiting, Disp: 20 tablet, Rfl: 0    Potassium Gluconate 2.5 MEQ TABS, Take 1 tablet by mouth daily, Disp: , Rfl:      Lab Results   Component Value Date    CREATININE 0.84 08/19/2024    CREATININE 0.83 07/16/2024    CREATININE 0.87 09/12/2023    K 3.7 08/19/2024    K 4.3 07/16/2024    K 4.0 09/12/2023    SODIUM 139 08/19/2024    SODIUM 146 (H) 07/16/2024    SODIUM 145 09/12/2023       I have personally reviewed the ECG from August 19, 2024 which showed normal sinus rhythm with PVCs and otherwise normal ECG.    REVIEW OF SYSTEMS   Positive for: Arthralgias and myalgias, palpitations, cough, atypical chest pain  Negative for: All remaining as reviewed below and in HPI.    SYSTEM SYMPTOMS REVIEWED:  General--weight change, fever, night sweats  Respiratory--cough, wheezing, shortness of breath, sputum production  Cardiovascular--chest pain, syncope, dyspnea on exertion, edema, decline in exercise tolerance, claudication   Gastrointestinal--persistent vomiting, diarrhea, abdominal distention, blood in stool   Muscular or skeletal--joint pain or swelling   Neurologic--headaches, syncope, abnormal movement  Hematologic--history of easy bruising and bleeding   Endocrine--thyroid enlargement, heat or cold intolerance, polyuria   Psychiatric--anxiety, depression     Vitals:    09/19/24 1048   BP: 132/80   Pulse: 76   SpO2: 95%   Weight: 68 kg (150 lb)   Height: 5' 1\" (1.549 m)       Wt Readings from Last 3 Encounters:   09/19/24 68 kg (150 lb)   09/10/24 69.9 kg (154 lb)   08/27/24 69.9 kg (154 lb)        Body mass index is 28.34 kg/m².     General physical examination:    General appearance: Alert, no acute distress, appears stated age, mildly overweight  HEENT: Mucous membranes are moist.  No " "obvious abnormality noted.  Neck: Supple with no lymphadenopathy.  No JVD.  Carotid pulses are intact.  No carotid bruit.  Cardiovascular system: Regular rhythm.  Normal S1 and S2.  No murmurs.  No rubs or gallops. Extremities: No edema. No cyanosis.  Pulmonary: Respirations unlabored.  Reduced air entry bilaterally.  Clear to auscultation bilaterally.  Gastrointestinal: Abdomen is soft and nontender.  Bowel sounds are positive.  Musculoskeletal: Upper Extremities: Normal upper motor strength.  Mild bilateral hand clubbing.  Lower Extremity: Normal motor strength. Gait: Normal.   Skin: Skin is warm. No rashes or lesions.  Neurological: Patient is alert and oriented with no gross motor deficits.  Psychiatric: Mood is normal.  Behavior is normal.        Thank you for allowing me to be a part of this patient's care. If you have further questions, please feel free to contact me.    Nader Ac MD, FACC, ADILENE    Portions of the record  have been created with voice recognition software.  Occasional grammatical mistakes or wrong word or \"sound alike\" substitutions may have occurred due to the inherent limitations of voice recognition software. Please reach out to me directly for any clarifications.     "

## 2024-09-19 NOTE — ASSESSMENT & PLAN NOTE
Even though she was noted to have frequent PVCs on her recent echocardiogram from September 2024, her left ventricular systolic function was normal, she did have mild concentric left ventricle hypertrophy and mild aortic sclerosis and mild mitral regurgitation.  I will recommend a repeat imaging in 3 years.  I agree with low-dose metoprolol which has recently been started.  We also discussed the mechanism for PVCs including stimulants, anxiety and stress etc.  On her recent echocardiogram, she was also noted to have interatrial septal aneurysm without any obvious shunting.  At this point it seems to be an incidental finding with no clinical implications.

## 2024-09-19 NOTE — ASSESSMENT & PLAN NOTE
Patient states that she is down to only 3 cigarettes for the last several months and trying her best to completely stop which would be the best case scenario.

## 2024-09-19 NOTE — ASSESSMENT & PLAN NOTE
Blood pressure is fairly well-controlled on current medications.  I agree with continuing the same strategy.  Target blood pressure is less than 130/80.

## 2024-09-19 NOTE — ASSESSMENT & PLAN NOTE
Her chest pain symptoms are quite atypical.  Her lipid profile looks quite fair.  I did discuss the possibility of a plain treadmill stress test with the understanding that there is a higher incidence of false positive results in females.  In that case, we may have to proceed with a nuclear stress test for further clarification.  She is comfortable with clinical follow-up for now and will reach out to my office if her symptoms change.

## 2024-09-24 ENCOUNTER — OFFICE VISIT (OUTPATIENT)
Dept: FAMILY MEDICINE CLINIC | Facility: CLINIC | Age: 54
End: 2024-09-24
Payer: COMMERCIAL

## 2024-09-24 VITALS
HEART RATE: 57 BPM | SYSTOLIC BLOOD PRESSURE: 134 MMHG | OXYGEN SATURATION: 98 % | HEIGHT: 61 IN | WEIGHT: 149.8 LBS | DIASTOLIC BLOOD PRESSURE: 82 MMHG | BODY MASS INDEX: 28.28 KG/M2

## 2024-09-24 DIAGNOSIS — K21.9 GASTROESOPHAGEAL REFLUX DISEASE WITHOUT ESOPHAGITIS: ICD-10-CM

## 2024-09-24 DIAGNOSIS — Z23 FLU VACCINE NEED: ICD-10-CM

## 2024-09-24 DIAGNOSIS — M79.7 FIBROMYALGIA: Primary | ICD-10-CM

## 2024-09-24 DIAGNOSIS — I10 PRIMARY HYPERTENSION: ICD-10-CM

## 2024-09-24 DIAGNOSIS — M54.9 BACK PAIN, UNSPECIFIED BACK LOCATION, UNSPECIFIED BACK PAIN LATERALITY, UNSPECIFIED CHRONICITY: ICD-10-CM

## 2024-09-24 DIAGNOSIS — J43.2 CENTRILOBULAR EMPHYSEMA (HCC): ICD-10-CM

## 2024-09-24 PROCEDURE — 99214 OFFICE O/P EST MOD 30 MIN: CPT | Performed by: FAMILY MEDICINE

## 2024-09-24 PROCEDURE — 90471 IMMUNIZATION ADMIN: CPT | Performed by: FAMILY MEDICINE

## 2024-09-24 PROCEDURE — 90673 RIV3 VACCINE NO PRESERV IM: CPT | Performed by: FAMILY MEDICINE

## 2024-09-24 RX ORDER — HYDROCODONE BITARTRATE AND ACETAMINOPHEN 5; 325 MG/1; MG/1
1 TABLET ORAL EVERY 6 HOURS PRN
Qty: 20 TABLET | Refills: 0 | Status: SHIPPED | OUTPATIENT
Start: 2024-09-24

## 2024-09-24 RX ORDER — DULOXETIN HYDROCHLORIDE 30 MG/1
30 CAPSULE, DELAYED RELEASE ORAL DAILY
Qty: 30 CAPSULE | Refills: 5 | Status: SHIPPED | OUTPATIENT
Start: 2024-09-24

## 2024-09-24 NOTE — PROGRESS NOTES
"Assessment/Plan:       1. Fibromyalgia  Comments:  increase cymbalta to 30mg  Orders:  -     DULoxetine (CYMBALTA) 30 mg delayed release capsule; Take 1 capsule (30 mg total) by mouth daily  2. Back pain, unspecified back location, unspecified back pain laterality, unspecified chronicity  Comments:  refill vicodin  Orders:  -     HYDROcodone-acetaminophen (Norco) 5-325 mg per tablet; Take 1 tablet by mouth every 6 (six) hours as needed for pain Max Daily Amount: 4 tablets  3. Primary hypertension  Assessment & Plan:  Continue meds, no changes  4. Gastroesophageal reflux disease without esophagitis  Comments:  continue omeprazole  5. Centrilobular emphysema (HCC)  6. Flu vaccine need  -     influenza vaccine, recombinant, PF, 0.5 mL IM (Flublok)        Subjective:      Patient ID: Ayde Gotti is a 54 y.o. female.    Ayde is here for a follow-up.  She did see cardiology and he agreed with her current treatment plan for the PVCs and blood pressure.  Input greatly appreciated.  She does have fibro the Cymbalta may have helped her anxiety a little bit but not much for the pain she needs a refill on Vicodin and I am going to increase the dose to 30 mg.  Gerd is stable on omeprazole.        The following portions of the patient's history were reviewed and updated as appropriate: allergies, current medications, past family history, past medical history, past social history, past surgical history, and problem list.    Review of Systems      Objective:      /82 (BP Location: Left arm, Patient Position: Sitting, Cuff Size: Standard)   Pulse 57   Ht 5' 1\" (1.549 m)   Wt 67.9 kg (149 lb 12.8 oz)   SpO2 98%   BMI 28.30 kg/m²          Physical Exam    "

## 2024-10-22 ENCOUNTER — OFFICE VISIT (OUTPATIENT)
Dept: FAMILY MEDICINE CLINIC | Facility: CLINIC | Age: 54
End: 2024-10-22
Payer: COMMERCIAL

## 2024-10-22 VITALS
WEIGHT: 146.2 LBS | HEIGHT: 61 IN | SYSTOLIC BLOOD PRESSURE: 130 MMHG | BODY MASS INDEX: 27.6 KG/M2 | DIASTOLIC BLOOD PRESSURE: 82 MMHG | HEART RATE: 71 BPM | OXYGEN SATURATION: 97 %

## 2024-10-22 DIAGNOSIS — I10 PRIMARY HYPERTENSION: Primary | ICD-10-CM

## 2024-10-22 DIAGNOSIS — K21.9 GASTROESOPHAGEAL REFLUX DISEASE WITHOUT ESOPHAGITIS: ICD-10-CM

## 2024-10-22 DIAGNOSIS — M54.9 BACK PAIN, UNSPECIFIED BACK LOCATION, UNSPECIFIED BACK PAIN LATERALITY, UNSPECIFIED CHRONICITY: ICD-10-CM

## 2024-10-22 DIAGNOSIS — M79.7 FIBROMYALGIA: ICD-10-CM

## 2024-10-22 DIAGNOSIS — E55.9 VITAMIN D DEFICIENCY: ICD-10-CM

## 2024-10-22 PROCEDURE — 99214 OFFICE O/P EST MOD 30 MIN: CPT | Performed by: FAMILY MEDICINE

## 2024-10-22 RX ORDER — HYDROCODONE BITARTRATE AND ACETAMINOPHEN 5; 325 MG/1; MG/1
1 TABLET ORAL EVERY 6 HOURS PRN
Qty: 30 TABLET | Refills: 0 | Status: SHIPPED | OUTPATIENT
Start: 2024-10-22

## 2024-10-22 RX ORDER — ERGOCALCIFEROL 1.25 MG/1
50000 CAPSULE, LIQUID FILLED ORAL WEEKLY
Qty: 8 CAPSULE | Refills: 1 | Status: SHIPPED | OUTPATIENT
Start: 2024-10-22

## 2024-10-22 NOTE — PROGRESS NOTES
"Assessment/Plan:       1. Primary hypertension  Assessment & Plan:  Continue meds, no changes  2. Fibromyalgia  Assessment & Plan:  Continue cymbalta  3. Gastroesophageal reflux disease without esophagitis  Assessment & Plan:  Continue ppi  No changes        Subjective:      Patient ID: Ayde Gotti is a 54 y.o. female.    Ayde is feeling better.  She has a colonoscopy scheduled for January.  Her blood pressure looks good.  She has no CP or sob. Or headache or blurry vision.  No palpitations.  Mood is more stable.  Less anxiety.  Gerd is stable on PPI. Her overall pain is better and well controlled.        The following portions of the patient's history were reviewed and updated as appropriate: allergies, current medications, past family history, past medical history, past social history, past surgical history, and problem list.    Review of Systems   Respiratory: Negative.     Cardiovascular: Negative.          Objective:      /82 (BP Location: Left arm, Patient Position: Sitting, Cuff Size: Standard)   Pulse 71   Ht 5' 1\" (1.549 m)   Wt 66.3 kg (146 lb 3.2 oz)   SpO2 97%   BMI 27.62 kg/m²          Physical Exam  Vitals and nursing note reviewed.   Constitutional:       Appearance: Normal appearance.   HENT:      Head: Normocephalic and atraumatic.      Nose: Nose normal.      Mouth/Throat:      Mouth: Mucous membranes are moist.   Eyes:      Extraocular Movements: Extraocular movements intact.      Pupils: Pupils are equal, round, and reactive to light.   Cardiovascular:      Rate and Rhythm: Normal rate and regular rhythm.      Pulses: Normal pulses.   Pulmonary:      Effort: Pulmonary effort is normal.      Breath sounds: Normal breath sounds.   Abdominal:      General: Bowel sounds are normal.      Palpations: Abdomen is soft.   Musculoskeletal:      Cervical back: Normal range of motion.   Skin:     General: Skin is warm and dry.      Capillary Refill: Capillary refill takes less than 2 " seconds.   Neurological:      General: No focal deficit present.      Mental Status: She is alert.   Psychiatric:         Mood and Affect: Mood normal.

## 2024-11-05 DIAGNOSIS — M79.7 FIBROMYALGIA: ICD-10-CM

## 2024-11-05 DIAGNOSIS — M54.9 BACK PAIN, UNSPECIFIED BACK LOCATION, UNSPECIFIED BACK PAIN LATERALITY, UNSPECIFIED CHRONICITY: ICD-10-CM

## 2024-11-05 RX ORDER — HYDROCODONE BITARTRATE AND ACETAMINOPHEN 5; 325 MG/1; MG/1
1 TABLET ORAL EVERY 6 HOURS PRN
Qty: 30 TABLET | Refills: 0 | Status: SHIPPED | OUTPATIENT
Start: 2024-11-05

## 2024-11-05 RX ORDER — DULOXETIN HYDROCHLORIDE 30 MG/1
30 CAPSULE, DELAYED RELEASE ORAL DAILY
Qty: 30 CAPSULE | Refills: 5 | Status: SHIPPED | OUTPATIENT
Start: 2024-11-05

## 2024-11-12 ENCOUNTER — HOSPITAL ENCOUNTER (OUTPATIENT)
Dept: RADIOLOGY | Facility: CLINIC | Age: 54
Discharge: HOME | End: 2024-11-12
Payer: COMMERCIAL

## 2024-11-12 VITALS — WEIGHT: 150 LBS | BODY MASS INDEX: 28.32 KG/M2 | HEIGHT: 61 IN

## 2024-11-12 DIAGNOSIS — Z12.31 ENCOUNTER FOR SCREENING MAMMOGRAM FOR MALIGNANT NEOPLASM OF BREAST: ICD-10-CM

## 2024-11-12 PROCEDURE — 77067 SCR MAMMO BI INCL CAD: CPT

## 2024-11-12 PROCEDURE — 77063 BREAST TOMOSYNTHESIS BI: CPT

## 2024-11-13 ENCOUNTER — HOSPITAL ENCOUNTER (EMERGENCY)
Facility: HOSPITAL | Age: 54
Discharge: HOME | End: 2024-11-13
Attending: EMERGENCY MEDICINE | Admitting: EMERGENCY MEDICINE
Payer: COMMERCIAL

## 2024-11-13 VITALS
HEIGHT: 61 IN | WEIGHT: 152.12 LBS | SYSTOLIC BLOOD PRESSURE: 189 MMHG | TEMPERATURE: 97.2 F | RESPIRATION RATE: 18 BRPM | DIASTOLIC BLOOD PRESSURE: 102 MMHG | HEART RATE: 68 BPM | BODY MASS INDEX: 28.72 KG/M2 | OXYGEN SATURATION: 100 %

## 2024-11-13 DIAGNOSIS — G43.909 MIGRAINE: Primary | ICD-10-CM

## 2024-11-13 LAB
ALBUMIN SERPL BCG-MCNC: 4.1 G/DL (ref 3.5–5)
ALP SERPL-CCNC: 67 U/L (ref 34–104)
ALT SERPL W P-5'-P-CCNC: 12 U/L (ref 7–52)
ANION GAP SERPL CALCULATED.3IONS-SCNC: 6 MMOL/L (ref 4–13)
AST SERPL W P-5'-P-CCNC: 14 U/L (ref 13–39)
BASOPHILS # BLD AUTO: 0.07 THOUSANDS/ÂΜL (ref 0–0.1)
BASOPHILS NFR BLD AUTO: 1 % (ref 0–1)
BILIRUB SERPL-MCNC: 0.39 MG/DL (ref 0.2–1)
BUN SERPL-MCNC: 10 MG/DL (ref 5–25)
CALCIUM SERPL-MCNC: 9.2 MG/DL (ref 8.4–10.2)
CHLORIDE SERPL-SCNC: 105 MMOL/L (ref 96–108)
CO2 SERPL-SCNC: 28 MMOL/L (ref 21–32)
CREAT SERPL-MCNC: 0.78 MG/DL (ref 0.6–1.3)
EOSINOPHIL # BLD AUTO: 0.16 THOUSAND/ÂΜL (ref 0–0.61)
EOSINOPHIL NFR BLD AUTO: 2 % (ref 0–6)
ERYTHROCYTE [DISTWIDTH] IN BLOOD BY AUTOMATED COUNT: 12.2 % (ref 11.6–15.1)
GFR SERPL CREATININE-BSD FRML MDRD: 86 ML/MIN/1.73SQ M
GLUCOSE SERPL-MCNC: 94 MG/DL (ref 65–140)
HCT VFR BLD AUTO: 41.3 % (ref 34.8–46.1)
HGB BLD-MCNC: 13.7 G/DL (ref 11.5–15.4)
IMM GRANULOCYTES # BLD AUTO: 0.02 THOUSAND/UL (ref 0–0.2)
IMM GRANULOCYTES NFR BLD AUTO: 0 % (ref 0–2)
LYMPHOCYTES # BLD AUTO: 2.11 THOUSANDS/ÂΜL (ref 0.6–4.47)
LYMPHOCYTES NFR BLD AUTO: 31 % (ref 14–44)
MAGNESIUM SERPL-MCNC: 1.9 MG/DL (ref 1.9–2.7)
MCH RBC QN AUTO: 31 PG (ref 26.8–34.3)
MCHC RBC AUTO-ENTMCNC: 33.2 G/DL (ref 31.4–37.4)
MCV RBC AUTO: 93 FL (ref 82–98)
MONOCYTES # BLD AUTO: 0.45 THOUSAND/ÂΜL (ref 0.17–1.22)
MONOCYTES NFR BLD AUTO: 7 % (ref 4–12)
NEUTROPHILS # BLD AUTO: 3.95 THOUSANDS/ÂΜL (ref 1.85–7.62)
NEUTS SEG NFR BLD AUTO: 59 % (ref 43–75)
NRBC BLD AUTO-RTO: 0 /100 WBCS
PLATELET # BLD AUTO: 255 THOUSANDS/UL (ref 149–390)
PMV BLD AUTO: 10.1 FL (ref 8.9–12.7)
POTASSIUM SERPL-SCNC: 4.3 MMOL/L (ref 3.5–5.3)
PROT SERPL-MCNC: 6.8 G/DL (ref 6.4–8.4)
RBC # BLD AUTO: 4.42 MILLION/UL (ref 3.81–5.12)
SODIUM SERPL-SCNC: 139 MMOL/L (ref 135–147)
WBC # BLD AUTO: 6.76 THOUSAND/UL (ref 4.31–10.16)

## 2024-11-13 PROCEDURE — 99283 EMERGENCY DEPT VISIT LOW MDM: CPT

## 2024-11-13 PROCEDURE — 96375 TX/PRO/DX INJ NEW DRUG ADDON: CPT

## 2024-11-13 PROCEDURE — 83735 ASSAY OF MAGNESIUM: CPT | Performed by: EMERGENCY MEDICINE

## 2024-11-13 PROCEDURE — 96365 THER/PROPH/DIAG IV INF INIT: CPT

## 2024-11-13 PROCEDURE — 99284 EMERGENCY DEPT VISIT MOD MDM: CPT | Performed by: EMERGENCY MEDICINE

## 2024-11-13 PROCEDURE — 85025 COMPLETE CBC W/AUTO DIFF WBC: CPT | Performed by: EMERGENCY MEDICINE

## 2024-11-13 PROCEDURE — 36415 COLL VENOUS BLD VENIPUNCTURE: CPT

## 2024-11-13 PROCEDURE — 96361 HYDRATE IV INFUSION ADD-ON: CPT

## 2024-11-13 PROCEDURE — 80053 COMPREHEN METABOLIC PANEL: CPT | Performed by: EMERGENCY MEDICINE

## 2024-11-13 RX ORDER — ONDANSETRON 2 MG/ML
4 INJECTION INTRAMUSCULAR; INTRAVENOUS ONCE
Status: COMPLETED | OUTPATIENT
Start: 2024-11-13 | End: 2024-11-13

## 2024-11-13 RX ORDER — METOCLOPRAMIDE HYDROCHLORIDE 5 MG/ML
10 INJECTION INTRAMUSCULAR; INTRAVENOUS ONCE
Status: COMPLETED | OUTPATIENT
Start: 2024-11-13 | End: 2024-11-13

## 2024-11-13 RX ORDER — KETOROLAC TROMETHAMINE 30 MG/ML
15 INJECTION, SOLUTION INTRAMUSCULAR; INTRAVENOUS ONCE
Status: COMPLETED | OUTPATIENT
Start: 2024-11-13 | End: 2024-11-13

## 2024-11-13 RX ORDER — MAGNESIUM SULFATE HEPTAHYDRATE 40 MG/ML
2 INJECTION, SOLUTION INTRAVENOUS ONCE
Status: COMPLETED | OUTPATIENT
Start: 2024-11-13 | End: 2024-11-13

## 2024-11-13 RX ORDER — DIPHENHYDRAMINE HYDROCHLORIDE 50 MG/ML
25 INJECTION INTRAMUSCULAR; INTRAVENOUS ONCE
Status: COMPLETED | OUTPATIENT
Start: 2024-11-13 | End: 2024-11-13

## 2024-11-13 RX ORDER — DEXAMETHASONE SODIUM PHOSPHATE 10 MG/ML
6 INJECTION, SOLUTION INTRAMUSCULAR; INTRAVENOUS ONCE
Status: COMPLETED | OUTPATIENT
Start: 2024-11-13 | End: 2024-11-13

## 2024-11-13 RX ADMIN — ONDANSETRON 4 MG: 2 INJECTION INTRAMUSCULAR; INTRAVENOUS at 13:30

## 2024-11-13 RX ADMIN — SODIUM CHLORIDE 1000 ML: 0.9 INJECTION, SOLUTION INTRAVENOUS at 13:30

## 2024-11-13 RX ADMIN — MAGNESIUM SULFATE HEPTAHYDRATE 2 G: 40 INJECTION, SOLUTION INTRAVENOUS at 14:34

## 2024-11-13 RX ADMIN — KETOROLAC TROMETHAMINE 15 MG: 30 INJECTION, SOLUTION INTRAMUSCULAR at 13:30

## 2024-11-13 RX ADMIN — METOCLOPRAMIDE HYDROCHLORIDE 10 MG: 5 INJECTION INTRAMUSCULAR; INTRAVENOUS at 14:29

## 2024-11-13 RX ADMIN — DEXAMETHASONE SODIUM PHOSPHATE 6 MG: 10 INJECTION, SOLUTION INTRAMUSCULAR; INTRAVENOUS at 14:29

## 2024-11-13 RX ADMIN — DIPHENHYDRAMINE HYDROCHLORIDE 25 MG: 50 INJECTION, SOLUTION INTRAMUSCULAR; INTRAVENOUS at 14:29

## 2024-11-13 NOTE — ED PROVIDER NOTES
Time reflects when diagnosis was documented in both MDM as applicable and the Disposition within this note       Time User Action Codes Description Comment    11/13/2024  3:09 PM Phuong Whyte Add [G43.909] Migraine           ED Disposition       ED Disposition   Discharge    Condition   Stable    Date/Time   Wed Nov 13, 2024  3:09 PM    Comment   Ayde Gotti discharge to home/self care.                   Assessment & Plan       Medical Decision Making  This patient presents with a headache most consistent with benign headache from either tension type headache versus migraine.  No headache red flags.  Neurological exam without evidence of meningismus, AMS, focal neurological findings so doubt meningitis, encephalitis, stroke.  Presentation not consistent with acute intracranial bleed to include SAH (lack of risk factors, headache and history).  No history of trauma so doubt ICH.  Given history and physical temporal arteritis unlikely, as is acute angle-closure glaucoma.  Doubt carotid artery dissection given no focal neural logical deficits, no neck trauma or recent neck strain.  Patient with no signs of increased intracranial pressure or weight loss and history of physical suggest more benign headache, so less likely mass effect on brain from tumor or abscess or idiopathic intracranial hypertension.  Pain was controlled with headache cocktail and patient discharged home with PMD follow-up.      Problems Addressed:  Migraine: acute illness or injury    Amount and/or Complexity of Data Reviewed  Labs: ordered. Decision-making details documented in ED Course.    Risk  OTC drugs.  Prescription drug management.        ED Course as of 11/13/24 1657   Wed Nov 13, 2024   1657 Patient reports feeling much better on reevaluation, laboratory findings unremarkable and discussed at bedside.       Medications   sodium chloride 0.9 % bolus 1,000 mL (1,000 mL Intravenous Not Given 11/13/24 1519)   sodium chloride 0.9 %  bolus 1,000 mL (0 mL Intravenous Stopped 11/13/24 1519)   ondansetron (ZOFRAN) injection 4 mg (4 mg Intravenous Given 11/13/24 1330)   ketorolac (TORADOL) injection 15 mg (15 mg Intravenous Given 11/13/24 1330)   metoclopramide (REGLAN) injection 10 mg (10 mg Intravenous Given 11/13/24 1429)   dexamethasone (PF) (DECADRON) injection 6 mg (6 mg Intravenous Given 11/13/24 1429)   magnesium sulfate 2 g/50 mL IVPB (premix) 2 g (0 g Intravenous Stopped 11/13/24 1519)   diphenhydrAMINE (BENADRYL) injection 25 mg (25 mg Intravenous Given 11/13/24 1429)       ED Risk Strat Scores                                               History of Present Illness       Chief Complaint   Patient presents with    Headache     Pt reports headache since yesterday, history of same       Past Medical History:   Diagnosis Date    Chronic bronchitis (HCC)     Fibromyalgia     Hypertension     Kidney stone     Lupus 2017    Migraines     Thoracic outlet syndrome       Past Surgical History:   Procedure Laterality Date    APPENDECTOMY      ENDOMETRIAL ABLATION      FL RETROGRADE PYELOGRAM  11/27/2022    FL RETROGRADE PYELOGRAM  2/14/2023    KNEE SURGERY      UT CYSTO BLADDER W/URETERAL CATHETERIZATION Right 11/26/2022    Procedure: CYSTOSCOPY RETROGRADE PYELOGRAM WITH INSERTION STENT URETERAL;  Surgeon: Dipti Novak MD;  Location: [facility];  Service: Urology    UT CYSTO/URETERO W/LITHOTRIPSY &INDWELL STENT INSRT Right 2/14/2023    Procedure: CYSTO USCOPE W/  LASER, & STENT EXCHANGE;  Surgeon: Nikolas Renteria MD;  Location: [facility];  Service: Urology    THORACIC SYMPATHETECTOMY        Family History   Problem Relation Age of Onset    Osteoporosis Mother     Cancer Father     No Known Problems Sister     No Known Problems Daughter     Breast cancer Maternal Grandmother     No Known Problems Maternal Grandfather     Breast cancer Paternal Grandmother     No Known Problems Paternal Grandfather     No Known Problems Paternal Aunt       Social  History     Tobacco Use    Smoking status: Every Day     Current packs/day: 0.25     Average packs/day: 0.6 packs/day for 40.0 years (25.0 ttl pk-yrs)     Types: Cigarettes     Passive exposure: Current    Smokeless tobacco: Never    Tobacco comments:     Last cigarette 2/14/23 1000, last marijuana 4 days ago   Vaping Use    Vaping status: Never Used   Substance Use Topics    Alcohol use: Yes     Alcohol/week: 5.0 standard drinks of alcohol     Types: 5 Standard drinks or equivalent per week     Comment: Social    Drug use: Yes     Frequency: 2.0 times per week     Types: Marijuana     Comment: Marijuana tincture- has medical marijuana card      E-Cigarette/Vaping    E-Cigarette Use Never User       E-Cigarette/Vaping Substances    Nicotine No     THC No     CBD No     Flavoring No     Other No     Unknown No       I have reviewed and agree with the history as documented.     Patient is a 54-year-old female presenting to the emergency department complaining of headache that started yesterday and has been persistent, she tried taking hydrocodone and Excedrin which offered no relief, she reports some associated nausea but no vomiting or diarrhea, pain is between the left eye and radiates posteriorly, she does report some tension in her neck, and a history of similar headaches previously, no fever, no head injury, denies any chest pain or shortness of breath, no dysuria or hematuria        Review of Systems   Constitutional: Negative.    HENT: Negative.     Eyes: Negative.    Respiratory: Negative.     Cardiovascular: Negative.    Gastrointestinal:  Positive for nausea.   Endocrine: Negative.    Genitourinary: Negative.    Musculoskeletal: Negative.    Skin: Negative.    Allergic/Immunologic: Negative.    Neurological:  Positive for headaches.   Hematological: Negative.    Psychiatric/Behavioral: Negative.             Objective       ED Triage Vitals [11/13/24 1148]   Temperature Pulse Blood Pressure Respirations SpO2  Patient Position - Orthostatic VS   (!) 97.2 °F (36.2 °C) 72 (!) 193/106 16 100 % Sitting      Temp Source Heart Rate Source BP Location FiO2 (%) Pain Score    Temporal Monitor Left arm -- 10 - Worst Possible Pain      Vitals      Date and Time Temp Pulse SpO2 Resp BP Pain Score FACES Pain Rating User   11/13/24 1524 -- 68 100 % 18 189/102 6 -- AM   11/13/24 1400 -- -- -- -- -- 8 -- AM   11/13/24 1330 -- -- -- -- -- 9 -- LAK   11/13/24 1148 97.2 °F (36.2 °C) 72 100 % 16 193/106 10 - Worst Possible Pain -- AS            Physical Exam  Constitutional:       Appearance: She is well-developed.   HENT:      Head: Normocephalic and atraumatic.      Nose: Nose normal.      Mouth/Throat:      Mouth: Mucous membranes are moist.   Eyes:      Extraocular Movements: Extraocular movements intact.      Conjunctiva/sclera: Conjunctivae normal.      Pupils: Pupils are equal, round, and reactive to light.   Cardiovascular:      Rate and Rhythm: Normal rate.   Pulmonary:      Effort: Pulmonary effort is normal.   Abdominal:      Palpations: Abdomen is soft.   Musculoskeletal:         General: Normal range of motion.      Cervical back: Normal range of motion and neck supple.   Skin:     General: Skin is warm and dry.   Neurological:      Mental Status: She is alert and oriented to person, place, and time.         Results Reviewed       Procedure Component Value Units Date/Time    Comprehensive metabolic panel [[id number]] Collected: 11/13/24 1150    Lab Status: Final result Specimen: Blood from Arm, Left Updated: 11/13/24 1223     Sodium 139 mmol/L      Potassium 4.3 mmol/L      Chloride 105 mmol/L      CO2 28 mmol/L      ANION GAP 6 mmol/L      BUN 10 mg/dL      Creatinine 0.78 mg/dL      Glucose 94 mg/dL      Calcium 9.2 mg/dL      AST 14 U/L      ALT 12 U/L      Alkaline Phosphatase 67 U/L      Total Protein 6.8 g/dL      Albumin 4.1 g/dL      Total Bilirubin 0.39 mg/dL      eGFR 86 ml/min/1.73sq m     Narrative:      National  Kidney Disease Foundation guidelines for Chronic Kidney Disease (CKD):     Stage 1 with normal or high GFR (GFR > 90 mL/min/1.73 square meters)    Stage 2 Mild CKD (GFR = 60-89 mL/min/1.73 square meters)    Stage 3A Moderate CKD (GFR = 45-59 mL/min/1.73 square meters)    Stage 3B Moderate CKD (GFR = 30-44 mL/min/1.73 square meters)    Stage 4 Severe CKD (GFR = 15-29 mL/min/1.73 square meters)    Stage 5 End Stage CKD (GFR <15 mL/min/1.73 square meters)  Note: GFR calculation is accurate only with a steady state creatinine    Magnesium [[id number]]  (Normal) Collected: 11/13/24 1150    Lab Status: Final result Specimen: Blood from Arm, Left Updated: 11/13/24 1223     Magnesium 1.9 mg/dL     CBC and differential [[id number]] Collected: 11/13/24 1150    Lab Status: Final result Specimen: Blood from Arm, Left Updated: 11/13/24 1155     WBC 6.76 Thousand/uL      RBC 4.42 Million/uL      Hemoglobin 13.7 g/dL      Hematocrit 41.3 %      MCV 93 fL      MCH 31.0 pg      MCHC 33.2 g/dL      RDW 12.2 %      MPV 10.1 fL      Platelets 255 Thousands/uL      nRBC 0 /100 WBCs      Segmented % 59 %      Immature Grans % 0 %      Lymphocytes % 31 %      Monocytes % 7 %      Eosinophils Relative 2 %      Basophils Relative 1 %      Absolute Neutrophils 3.95 Thousands/µL      Absolute Immature Grans 0.02 Thousand/uL      Absolute Lymphocytes 2.11 Thousands/µL      Absolute Monocytes 0.45 Thousand/µL      Eosinophils Absolute 0.16 Thousand/µL      Basophils Absolute 0.07 Thousands/µL             No orders to display       Procedures    ED Medication and Procedure Management   Prior to Admission Medications   Prescriptions Last Dose Informant Patient Reported? Taking?   ASCORBIC ACID PO   Yes No   Sig: Take by mouth   Ascorbic Acid, Vitamin C, (VITAMIN C) 100 MG tablet   Yes No   Sig: Take by mouth   Cholecalciferol 50 MCG (2000 UT) CAPS   Yes No   Sig: Take by mouth   DULoxetine (CYMBALTA) 30 mg delayed release capsule   No No   Sig:  Take 1 capsule (30 mg total) by mouth daily   HYDROcodone-acetaminophen (Norco) 5-325 mg per tablet   No No   Sig: Take 1 tablet by mouth every 6 (six) hours as needed for pain Max Daily Amount: 4 tablets   MAGNESIUM-OXIDE PO   Yes No   Sig: Take by mouth   MULTIPLE VITAMINS PO   Yes No   Sig: Take by mouth   NON FORMULARY   Yes No   Sig: Medical Marijuana   Omega-3 Fatty Acids (fish oil) 1,000 mg   Yes No   Sig: Take by mouth   Potassium Gluconate 2.5 MEQ TABS   Yes No   Sig: Take 1 tablet by mouth daily   ergocalciferol (VITAMIN D2) 50,000 units   No No   Sig: Take 1 capsule (50,000 Units total) by mouth once a week   ibuprofen (MOTRIN) 200 mg tablet   Yes No   Sig: Take by mouth every 6 (six) hours as needed for mild pain   levothyroxine 25 mcg tablet   Yes No   Sig: Take 1 tablet by mouth daily before breakfast   losartan (COZAAR) 100 MG tablet   Yes No   Sig: Take 100 mg by mouth daily   metoprolol succinate (TOPROL-XL) 25 mg 24 hr tablet   No No   Sig: Take 1 tablet (25 mg total) by mouth daily   omeprazole (PriLOSEC) 20 mg delayed release capsule   No No   Sig: Take 1 capsule (20 mg total) by mouth daily   ondansetron (ZOFRAN-ODT) 4 mg disintegrating tablet   No No   Sig: Take 1 tablet (4 mg total) by mouth every 6 (six) hours as needed for nausea or vomiting      Facility-Administered Medications: None     Discharge Medication List as of 11/13/2024  3:09 PM        CONTINUE these medications which have NOT CHANGED    Details   !! ASCORBIC ACID PO Take by mouth, Historical Med      !! Ascorbic Acid, Vitamin C, (VITAMIN C) 100 MG tablet Take by mouth, Historical Med      Cholecalciferol 50 MCG (2000 UT) CAPS Take by mouth, Historical Med      DULoxetine (CYMBALTA) 30 mg delayed release capsule Take 1 capsule (30 mg total) by mouth daily, Starting Tue 11/5/2024, Normal      ergocalciferol (VITAMIN D2) 50,000 units Take 1 capsule (50,000 Units total) by mouth once a week, Starting Tue 10/22/2024, Normal       HYDROcodone-acetaminophen (Norco) 5-325 mg per tablet Take 1 tablet by mouth every 6 (six) hours as needed for pain Max Daily Amount: 4 tablets, Starting Tue 11/5/2024, Normal      ibuprofen (MOTRIN) 200 mg tablet Take by mouth every 6 (six) hours as needed for mild pain, Historical Med      levothyroxine 25 mcg tablet Take 1 tablet by mouth daily before breakfast, Starting Mon 4/15/2024, Historical Med      losartan (COZAAR) 100 MG tablet Take 100 mg by mouth daily, Starting Tue 9/20/2022, Historical Med      MAGNESIUM-OXIDE PO Take by mouth, Historical Med      metoprolol succinate (TOPROL-XL) 25 mg 24 hr tablet Take 1 tablet (25 mg total) by mouth daily, Starting Mon 8/19/2024, Normal      MULTIPLE VITAMINS PO Take by mouth, Historical Med      NON FORMULARY Medical Marijuana, Historical Med      Omega-3 Fatty Acids (fish oil) 1,000 mg Take by mouth, Historical Med      omeprazole (PriLOSEC) 20 mg delayed release capsule Take 1 capsule (20 mg total) by mouth daily, Starting Tue 8/27/2024, Normal      ondansetron (ZOFRAN-ODT) 4 mg disintegrating tablet Take 1 tablet (4 mg total) by mouth every 6 (six) hours as needed for nausea or vomiting, Starting Mon 8/19/2024, Normal      Potassium Gluconate 2.5 MEQ TABS Take 1 tablet by mouth daily, Historical Med       !! - Potential duplicate medications found. Please discuss with provider.        No discharge procedures on file.  ED SEPSIS DOCUMENTATION   Time reflects when diagnosis was documented in both MDM as applicable and the Disposition within this note       Time User Action Codes Description Comment    11/13/2024  3:09 PM Phuong Whyte Add [G43.909] Migraine                  Phuong Whyte DO  11/13/24 1651

## 2024-11-14 ENCOUNTER — VBI (OUTPATIENT)
Dept: FAMILY MEDICINE CLINIC | Facility: CLINIC | Age: 54
End: 2024-11-14

## 2024-11-14 NOTE — LETTER
[facility]  [address]  [place] [address]    Date: 11/18/24    Ayde Gotti  [address] [facility] [address]    Dear Ayde:                                                                                                                                Thank you for choosing [facility] emergency department for care.  Your primary care provider wants to make sure that your ongoing medical care is being addressed. If you require follow up care as a result of your emergency department visit, there are a few things the practice would like you to know.                As part of the network's continuing commitment to caring for our patients, we have added more same day appointments and have extended office hours to meet your medical needs. After hours, on-call physicians are available via your primary care provider's main office line.               We encourage you to contact our office prior to seeking treatment to discuss your symptoms with the medical staff.  Together, we can determine the correct course of action.  A majority of non-emergent conditions such as: common cold, flu-like symptoms, fevers, strains/sprains, dislocations, minor burns, cuts and animal bites can be treated at [facility] facilities. Diagnostic testing is available at some sites.               Of course, if you are experiencing a life threatening medical emergency call 911 or proceed directly to the nearest emergency room.    Your nearest [facility] facility is conveniently located at:    [address] [address]  [phone]  SKIP THE WAIT  Conveniently offered at most [facility] locations  Follansbee your spot online at [email or web address] or on the [facility]    Sincerely,    [facility]  Dept: [phone]

## 2024-11-14 NOTE — TELEPHONE ENCOUNTER
11/14/24 3:22 PM    Patient contacted post ED visit, first outreach attempt made. Message was left for patient to return a call to the VBI Department at [facility]: Phone [phone].    Thank you.  Addie Jonas  [facility]

## 2024-11-15 ENCOUNTER — RESULTS FOLLOW-UP (OUTPATIENT)
Dept: FAMILY MEDICINE CLINIC | Facility: CLINIC | Age: 54
End: 2024-11-15

## 2024-11-15 NOTE — TELEPHONE ENCOUNTER
11/15/24 1:48 PM    Patient contacted post ED visit, second outreach attempt made. Message was left for patient to return a call to the VBI Department at [facility]: Phone [phone].    Thank you.  Addie Jonas  [facility]

## 2024-11-18 NOTE — TELEPHONE ENCOUNTER
11/18/24 3:15 PM    Patient contacted post ED visit, third outreach attempt made. Message was left for patient to return a call to either the VBI Department at [facility]: Phone [phone] or the PCP office.     Thank you.  Addie Jonas  [facility]

## 2024-11-25 DIAGNOSIS — M54.9 BACK PAIN, UNSPECIFIED BACK LOCATION, UNSPECIFIED BACK PAIN LATERALITY, UNSPECIFIED CHRONICITY: ICD-10-CM

## 2024-11-25 RX ORDER — HYDROCODONE BITARTRATE AND ACETAMINOPHEN 5; 325 MG/1; MG/1
1 TABLET ORAL EVERY 6 HOURS PRN
Qty: 30 TABLET | Refills: 0 | Status: SHIPPED | OUTPATIENT
Start: 2024-11-25

## 2024-11-25 NOTE — TELEPHONE ENCOUNTER
Reason for call:   [x] Refill   [] Prior Auth  [] Other:     Office:   [x] PCP/Provider - [facility]   [] Specialty/Provider -     Medication:   ~ HYDROcodone-acetaminophen (Norco) 5-325 mg per tablet - Take 1 tablet by mouth every 6 (six) hours as needed for pain Max Daily Amount: 4 tablets     Pharmacy:   [id number] [address]     Does the patient have enough for 3 days?   [] Yes   [x] No - Send as HP to POD

## 2024-12-10 DIAGNOSIS — M54.9 BACK PAIN, UNSPECIFIED BACK LOCATION, UNSPECIFIED BACK PAIN LATERALITY, UNSPECIFIED CHRONICITY: ICD-10-CM

## 2024-12-10 NOTE — TELEPHONE ENCOUNTER
Reason for call:   [x] Refill   [] Prior Auth  [] Other:     Office:   [x] PCP/Provider -   [] Specialty/Provider -     Medication: Hydrocodone-Acetaminophen    Dose/Frequency: 5-325 mg    Quantity: 30 tablets    Pharmacy:   [id number] - [place] [address] [phone]     Does the patient have enough for 3 days?   [x] Yes   [] No - Send as HP to POD

## 2024-12-11 RX ORDER — HYDROCODONE BITARTRATE AND ACETAMINOPHEN 5; 325 MG/1; MG/1
1 TABLET ORAL EVERY 6 HOURS PRN
Qty: 30 TABLET | Refills: 0 | Status: SHIPPED | OUTPATIENT
Start: 2024-12-11

## 2024-12-31 DIAGNOSIS — E55.9 VITAMIN D DEFICIENCY: ICD-10-CM

## 2024-12-31 DIAGNOSIS — M54.9 BACK PAIN, UNSPECIFIED BACK LOCATION, UNSPECIFIED BACK PAIN LATERALITY, UNSPECIFIED CHRONICITY: ICD-10-CM

## 2024-12-31 RX ORDER — HYDROCODONE BITARTRATE AND ACETAMINOPHEN 5; 325 MG/1; MG/1
1 TABLET ORAL EVERY 6 HOURS PRN
Qty: 30 TABLET | Refills: 0 | Status: SHIPPED | OUTPATIENT
Start: 2024-12-31

## 2024-12-31 RX ORDER — ERGOCALCIFEROL 1.25 MG/1
50000 CAPSULE, LIQUID FILLED ORAL WEEKLY
Qty: 8 CAPSULE | Refills: 1 | Status: SHIPPED | OUTPATIENT
Start: 2024-12-31

## 2024-12-31 NOTE — TELEPHONE ENCOUNTER
Medication: hydrocodone-acetaminophen  PDMP   Active agreement on file -No          abdominal pain , dizziness , sweating * 1 day

## 2024-12-31 NOTE — TELEPHONE ENCOUNTER
Patient is going out of town tonight and is hoping to  medication today     Robert Budinetz, MD- [facility]-  HYDROcodone-acetaminophen (Norco) 5-325 mg per tablet -   1 tablet by mouth every 6 (six) hours as needed for pain Max Daily Amount: 4 tablets      ergocalciferol (VITAMIN D2) 50,000 units -Take 1 capsule (50,000 Units total) by mouth once a week         [id number] - [place] - [address] [place] [address]    Needs HP

## 2025-01-16 DIAGNOSIS — M54.9 BACK PAIN, UNSPECIFIED BACK LOCATION, UNSPECIFIED BACK PAIN LATERALITY, UNSPECIFIED CHRONICITY: ICD-10-CM

## 2025-01-16 RX ORDER — HYDROCODONE BITARTRATE AND ACETAMINOPHEN 5; 325 MG/1; MG/1
1 TABLET ORAL EVERY 6 HOURS PRN
Qty: 30 TABLET | Refills: 0 | Status: SHIPPED | OUTPATIENT
Start: 2025-01-16

## 2025-01-16 NOTE — TELEPHONE ENCOUNTER
Reason for call:   [x] Refill   [] Prior Auth  [] Other:     Office:   [x] PCP/Provider - [facility]   [] Specialty/Provider -     Medication: HYDROcodone-acetaminophen (Norco) 5-325 mg per tab     Dose/Frequency: 1 tablet, Every 6 hours PRN     Quantity: 30    Pharm[facility]: [id number]    Does the patient have enough for 3 days?   [x] Yes   [] No - Send as HP to POD

## 2025-01-27 ENCOUNTER — OFFICE VISIT (OUTPATIENT)
Dept: FAMILY MEDICINE CLINIC | Facility: CLINIC | Age: 55
End: 2025-01-27
Payer: COMMERCIAL

## 2025-01-27 VITALS
OXYGEN SATURATION: 96 % | HEIGHT: 61 IN | SYSTOLIC BLOOD PRESSURE: 151 MMHG | HEART RATE: 68 BPM | DIASTOLIC BLOOD PRESSURE: 80 MMHG | BODY MASS INDEX: 29.68 KG/M2 | WEIGHT: 157.2 LBS

## 2025-01-27 DIAGNOSIS — M79.7 FIBROMYALGIA: ICD-10-CM

## 2025-01-27 DIAGNOSIS — I10 PRIMARY HYPERTENSION: Primary | ICD-10-CM

## 2025-01-27 DIAGNOSIS — E55.9 VITAMIN D DEFICIENCY: ICD-10-CM

## 2025-01-27 DIAGNOSIS — F11.20 OPIOID DEPENDENCE, UNCOMPLICATED (HCC): ICD-10-CM

## 2025-01-27 DIAGNOSIS — F17.210 SMOKING GREATER THAN 20 PACK YEARS: ICD-10-CM

## 2025-01-27 DIAGNOSIS — J43.2 CENTRILOBULAR EMPHYSEMA (HCC): ICD-10-CM

## 2025-01-27 DIAGNOSIS — Z12.4 SCREENING FOR CERVICAL CANCER: ICD-10-CM

## 2025-01-27 DIAGNOSIS — E06.3 HYPOTHYROIDISM DUE TO HASHIMOTO THYROIDITIS: ICD-10-CM

## 2025-01-27 PROCEDURE — 99214 OFFICE O/P EST MOD 30 MIN: CPT | Performed by: FAMILY MEDICINE

## 2025-01-27 RX ORDER — DULOXETIN HYDROCHLORIDE 60 MG/1
60 CAPSULE, DELAYED RELEASE ORAL DAILY
Qty: 30 CAPSULE | Refills: 5 | Status: SHIPPED | OUTPATIENT
Start: 2025-01-27

## 2025-01-27 NOTE — PROGRESS NOTES
"Name: Ayde Gotti      : 1970      MRN: [id number]  Encounter Provider: Robert Budinetz, MD  Encounter Date: 2025   Encounter department: [facility]  :  Assessment & Plan  Primary hypertension  Continue meds, no changes    Orders:  •  TSH, 3rd generation with Free T4 reflex; Future  •  Comprehensive metabolic panel; Future    Fibromyalgia  Increase cymbalta to 60mg qd    Orders:  •  DULoxetine (CYMBALTA) 60 mg delayed release capsule; Take 1 capsule (60 mg total) by mouth daily    Vitamin D deficiency  Checka level  Orders:  •  Vitamin D 1,25 dihydroxy; Future    Hypothyroidism due to Hashimoto thyroiditis  Check tsh  Continue synthroid         Smoking greater than 20 pack years  Check ct scan  Orders:  •  CT lung screening program; Future    Screening for cervical cancer  Ref to obgyn  Orders:  •  Ambulatory referral to Obstetrics / Gynecology; Future    Opioid dependence, uncomplicated (HCC)         Centrilobular emphysema (HCC)                History of Present Illness   The patient is here to follow-up on hypertension, hypothyroidism, and fibromyalgia.  She is due for a screening lung CT which I ordered.  She is due for some blood work.  Her fibro was not improved by adding Cymbalta we will increase the dose.  She does need a gynecologist.    Review of Systems   Respiratory: Negative.     Cardiovascular: Negative.    Gastrointestinal: Negative.        Objective   /80 (BP Location: Left arm, Patient Position: Sitting, Cuff Size: Large)   Pulse 68   Ht 5' 1\" (1.549 m)   Wt 71.3 kg (157 lb 3.2 oz)   SpO2 96%   BMI 29.70 kg/m²      Physical Exam  Vitals and nursing note reviewed.   Constitutional:       General: She is not in acute distress.     Appearance: She is well-developed.   HENT:      Head: Normocephalic and atraumatic.   Eyes:      Conjunctiva/sclera: Conjunctivae normal.   Cardiovascular:      Rate and Rhythm: Normal rate and regular rhythm.      Heart " sounds: No murmur heard.  Pulmonary:      Effort: Pulmonary effort is normal. No respiratory distress.      Breath sounds: Normal breath sounds.   Abdominal:      Palpations: Abdomen is soft.      Tenderness: There is no abdominal tenderness.   Musculoskeletal:         General: No swelling.      Cervical back: Neck supple.   Skin:     General: Skin is warm and dry.      Capillary Refill: Capillary refill takes less than 2 seconds.   Neurological:      Mental Status: She is alert.   Psychiatric:         Mood and Affect: Mood normal.

## 2025-01-27 NOTE — ASSESSMENT & PLAN NOTE
Continue meds, no changes    Orders:    TSH, 3rd generation with Free T4 reflex; Future    Comprehensive metabolic panel; Future

## 2025-01-27 NOTE — ASSESSMENT & PLAN NOTE
Increase cymbalta to 60mg qd    Orders:    DULoxetine (CYMBALTA) 60 mg delayed release capsule; Take 1 capsule (60 mg total) by mouth daily

## 2025-01-29 ENCOUNTER — HOSPITAL ENCOUNTER (OUTPATIENT)
Dept: RADIOLOGY | Facility: CLINIC | Age: 55
Discharge: HOME | End: 2025-01-29
Payer: COMMERCIAL

## 2025-01-29 ENCOUNTER — RESULTS FOLLOW-UP (OUTPATIENT)
Dept: FAMILY MEDICINE CLINIC | Facility: CLINIC | Age: 55
End: 2025-01-29

## 2025-01-29 VITALS — BODY MASS INDEX: 29.64 KG/M2 | HEIGHT: 61 IN | WEIGHT: 157 LBS

## 2025-01-29 DIAGNOSIS — R92.8 ABNORMAL SCREENING MAMMOGRAM: ICD-10-CM

## 2025-01-29 DIAGNOSIS — R92.8 ABNORMAL ULTRASOUND OF BREAST: Primary | ICD-10-CM

## 2025-01-29 PROCEDURE — 77065 DX MAMMO INCL CAD UNI: CPT

## 2025-01-29 PROCEDURE — 76642 ULTRASOUND BREAST LIMITED: CPT

## 2025-01-29 PROCEDURE — G0279 TOMOSYNTHESIS, MAMMO: HCPCS

## 2025-02-06 DIAGNOSIS — M54.9 BACK PAIN, UNSPECIFIED BACK LOCATION, UNSPECIFIED BACK PAIN LATERALITY, UNSPECIFIED CHRONICITY: ICD-10-CM

## 2025-02-06 RX ORDER — HYDROCODONE BITARTRATE AND ACETAMINOPHEN 5; 325 MG/1; MG/1
1 TABLET ORAL EVERY 6 HOURS PRN
Qty: 30 TABLET | Refills: 0 | Status: SHIPPED | OUTPATIENT
Start: 2025-02-06

## 2025-02-06 NOTE — TELEPHONE ENCOUNTER
Reason for call:   [x] Refill   [] Prior Auth  [] Other:     Office:   [x] PCP/Provider - Robert Budinetz, MD   [] Specialty/Provider -     Medication: HYDROcodone-acetaminophen (Norco) 5-325 mg per tablet     Dose/Frequency: Take 1 tablet by mouth every 6 (six) hours as needed for pain Max Daily Amount: 4 tablets    Quantity: 30    [id number] [address]     Does the patient have enough for 3 days?   [] Yes   [x] No - Send as HP to POD

## 2025-02-10 DIAGNOSIS — M79.7 FIBROMYALGIA: ICD-10-CM

## 2025-02-11 RX ORDER — DULOXETIN HYDROCHLORIDE 60 MG/1
60 CAPSULE, DELAYED RELEASE ORAL DAILY
Qty: 90 CAPSULE | Refills: 1 | Status: SHIPPED | OUTPATIENT
Start: 2025-02-11

## 2025-02-26 DIAGNOSIS — M54.9 BACK PAIN, UNSPECIFIED BACK LOCATION, UNSPECIFIED BACK PAIN LATERALITY, UNSPECIFIED CHRONICITY: ICD-10-CM

## 2025-02-26 RX ORDER — HYDROCODONE BITARTRATE AND ACETAMINOPHEN 5; 325 MG/1; MG/1
1 TABLET ORAL EVERY 6 HOURS PRN
Qty: 30 TABLET | Refills: 0 | Status: SHIPPED | OUTPATIENT
Start: 2025-02-26

## 2025-02-26 NOTE — TELEPHONE ENCOUNTER
Reason for call:   [x] Refill   [] Prior Auth  [] Other:     Office:   [x] PCP/Provider - Budinetz  [] Specialty/Provider -     Medication:   Norco 5-325, 1 q6 prn, 30    Pharmacy:   [facility]    Does the patient have enough for 3 days?   [x] Yes   [] No - Send as HP to POD

## 2025-03-14 DIAGNOSIS — M54.9 BACK PAIN, UNSPECIFIED BACK LOCATION, UNSPECIFIED BACK PAIN LATERALITY, UNSPECIFIED CHRONICITY: ICD-10-CM

## 2025-03-14 NOTE — TELEPHONE ENCOUNTER
Reason for call:   [] Refill   [x] Prior Auth  [] Other:     Office:   [x] PCP/Provider -   [] Specialty/Provider -     Medication:   HYDROcodone-acetaminophen (Norco) 5-325 mg per tablet    Dose/Frequency: Sig: Take 1 tablet by mouth every 6 (six) hours as needed for pain Max Daily Amount: 4 tablets      Quantity: 30    [facility]: [id number] - [address] [address]  Phone: [phone]  Fax: [phone]    Local Pharmacy   Does the patient have enough for 3 days?   [x] Yes   [] No - Send as HP to POD    Mail Away Pharmacy   Does the patient have enough for 10 days?   [] Yes   [] No - Send as HP to POD

## 2025-03-16 RX ORDER — HYDROCODONE BITARTRATE AND ACETAMINOPHEN 5; 325 MG/1; MG/1
1 TABLET ORAL EVERY 6 HOURS PRN
Qty: 30 TABLET | Refills: 0 | Status: SHIPPED | OUTPATIENT
Start: 2025-03-16 | End: 2025-03-18 | Stop reason: SDUPTHER

## 2025-03-18 RX ORDER — HYDROCODONE BITARTRATE AND ACETAMINOPHEN 5; 325 MG/1; MG/1
1 TABLET ORAL EVERY 6 HOURS PRN
Qty: 30 TABLET | Refills: 0 | Status: SHIPPED | OUTPATIENT
Start: 2025-03-18

## 2025-03-24 ENCOUNTER — OFFICE VISIT (OUTPATIENT)
Dept: FAMILY MEDICINE CLINIC | Facility: CLINIC | Age: 55
End: 2025-03-24
Payer: COMMERCIAL

## 2025-03-24 VITALS
RESPIRATION RATE: 18 BRPM | SYSTOLIC BLOOD PRESSURE: 132 MMHG | HEART RATE: 68 BPM | WEIGHT: 155 LBS | DIASTOLIC BLOOD PRESSURE: 82 MMHG | OXYGEN SATURATION: 98 % | BODY MASS INDEX: 29.27 KG/M2 | HEIGHT: 61 IN

## 2025-03-24 DIAGNOSIS — Z12.2 SCREENING FOR LUNG CANCER: Primary | ICD-10-CM

## 2025-03-24 DIAGNOSIS — M54.32 SCIATICA OF LEFT SIDE: ICD-10-CM

## 2025-03-24 DIAGNOSIS — Z12.11 COLON CANCER SCREENING: ICD-10-CM

## 2025-03-24 DIAGNOSIS — K42.9 UMBILICAL HERNIA WITHOUT OBSTRUCTION AND WITHOUT GANGRENE: ICD-10-CM

## 2025-03-24 DIAGNOSIS — M79.7 FIBROMYALGIA: ICD-10-CM

## 2025-03-24 PROCEDURE — 99214 OFFICE O/P EST MOD 30 MIN: CPT | Performed by: FAMILY MEDICINE

## 2025-03-24 RX ORDER — DULOXETIN HYDROCHLORIDE 30 MG/1
30 CAPSULE, DELAYED RELEASE ORAL DAILY
Qty: 30 CAPSULE | Refills: 5 | Status: SHIPPED | OUTPATIENT
Start: 2025-03-24

## 2025-03-24 NOTE — PROGRESS NOTES
Name: Ayde Gotti      : 1970      MRN: [id number]  Encounter Provider: Robert Budinetz, MD  Encounter Date: 3/24/2025   Encounter department: [facility]IMARY [facility]  :  Assessment & Plan  Screening for lung cancer  I discussed with her that she is a candidate for lung cancer CT screening.     The following Shared Decision-Making points were covered:  Benefits of screening were discussed, including the rates of reduction in death from lung cancer and other causes.  Harms of screening were reviewed, including false positive tests, radiation exposure levels, risks of invasive procedures, risks of complications of screening, and risk of overdiagnosis.  I counseled on the importance of adherence to annual lung cancer LDCT screening, impact of co-morbidities, and ability or willingness to undergo diagnosis and treatment.  I counseled on the importance of maintaining abstinence as a former smoker or was counseled on the importance of smoking cessation if a current smoker    Review of Eligibility Criteria: She meets all of the criteria for Lung Cancer Screening.   She is 54 y.o.   She has 20 pack year tobacco history and is a current smoker or has quit within the past 15 years  She presents no signs or symptoms of lung cancer    After discussion, the patient decided to elect lung cancer screening.    Orders:    CT lung screening program; Future    Umbilical hernia without obstruction and without gangrene  Check ultrasound  Orders:    US abdomen complete; Future    Colon cancer screening  Refer for colonoscopy  Orders:    Ambulatory Referral to Gastroenterology; Future    Sciatica of left side  Check MRI  Orders:    MRI lumbar spine wo contrast; Future           History of Present Illness   The patient does have a few acute concerns.  She has chronic low back pain and has seen pain management in the past it is sciatica of the left side working to check an MRI of her lumbar spine she is on hydrocodone  "for this.  She also needs a CAT scan lung cancer screening she has a greater than 30-pack-year smoking history.  The Cymbalta does not seem to be helping her fibro myalgia we are going to decrease it and wean her off of that she does need a colonoscopy.  She also has a history of umbilical hernia repair and now has a lump in the umbilicus region again.  She has some discomfort but no constipation.    Abdominal Pain      Review of Systems   Respiratory: Negative.     Cardiovascular: Negative.    Gastrointestinal:  Positive for abdominal pain.       Objective   /82 (BP Location: Left arm, Patient Position: Sitting, Cuff Size: Standard)   Pulse 68   Resp 18   Ht 5' 1\" (1.549 m)   Wt 70.3 kg (155 lb)   SpO2 98%   BMI 29.29 kg/m²      Physical Exam  Vitals and nursing note reviewed.   Constitutional:       General: She is not in acute distress.     Appearance: She is well-developed.   HENT:      Head: Normocephalic and atraumatic.   Eyes:      Conjunctiva/sclera: Conjunctivae normal.   Cardiovascular:      Rate and Rhythm: Normal rate and regular rhythm.      Heart sounds: No murmur heard.  Pulmonary:      Effort: Pulmonary effort is normal. No respiratory distress.      Breath sounds: Normal breath sounds.   Abdominal:      Palpations: Abdomen is soft.      Tenderness: There is no abdominal tenderness.      Hernia: A hernia is present. Hernia is present in the umbilical area.      Comments: Reducible soft umbilical hernia   Musculoskeletal:         General: No swelling.      Cervical back: Neck supple.      Comments: Left lumbar spasm   Skin:     General: Skin is warm and dry.      Capillary Refill: Capillary refill takes less than 2 seconds.   Neurological:      Mental Status: She is alert.   Psychiatric:         Mood and Affect: Mood normal.         "

## 2025-04-02 DIAGNOSIS — M54.9 BACK PAIN, UNSPECIFIED BACK LOCATION, UNSPECIFIED BACK PAIN LATERALITY, UNSPECIFIED CHRONICITY: ICD-10-CM

## 2025-04-02 NOTE — TELEPHONE ENCOUNTER
Reason for call:   [x] Refill   [] Prior Auth  [] Other:     Office:   [x] PCP/Provider - Robert Budinetz,   [] Specialty/Provider -     Medication: HYDROcodone-acetaminophen (Norco) 5-325 mg     Dose/Frequency: Take 1 tablet by mouth every 6 (six) hours as needed for pain     Quantity: 30    Pharmacy: [facility]    [facility]   Does the patient have enough for 3 days?   [x] Yes   [] No - Send as HP to POD    Mail Away Pharmacy   Does the patient have enough for 10 days?   [] Yes   [] No - Send as HP to POD

## 2025-04-03 RX ORDER — HYDROCODONE BITARTRATE AND ACETAMINOPHEN 5; 325 MG/1; MG/1
1 TABLET ORAL EVERY 6 HOURS PRN
Qty: 30 TABLET | Refills: 0 | Status: SHIPPED | OUTPATIENT
Start: 2025-04-03

## 2025-04-16 DIAGNOSIS — E55.9 VITAMIN D DEFICIENCY: ICD-10-CM

## 2025-04-16 DIAGNOSIS — M54.9 BACK PAIN, UNSPECIFIED BACK LOCATION, UNSPECIFIED BACK PAIN LATERALITY, UNSPECIFIED CHRONICITY: ICD-10-CM

## 2025-04-16 NOTE — TELEPHONE ENCOUNTER
Reason for call:   [x] Refill   [] Prior Auth  [] Other:     Office:   [x] PCP/Provider -  Robert Budinetz [facility]   [] Specialty/Provider -     Medication: Norco     Dose/Frequency: 5-325 mg     Quantity: #30    Pharmacy: [facility]     Local Pharmacy   Does the patient have enough for 3 days?   [x] Yes   [] No - Send as HP to POD    Mail Away Pharmacy   Does the patient have enough for 10 days?   [] Yes   [] No - Send as HP to POD

## 2025-04-17 ENCOUNTER — TELEPHONE (OUTPATIENT)
Age: 55
End: 2025-04-17

## 2025-04-17 RX ORDER — HYDROCODONE BITARTRATE AND ACETAMINOPHEN 5; 325 MG/1; MG/1
1 TABLET ORAL EVERY 6 HOURS PRN
Qty: 30 TABLET | Refills: 0 | Status: SHIPPED | OUTPATIENT
Start: 2025-04-17 | End: 2025-04-19 | Stop reason: SDUPTHER

## 2025-04-17 RX ORDER — ERGOCALCIFEROL 1.25 MG/1
50000 CAPSULE, LIQUID FILLED ORAL WEEKLY
Qty: 8 CAPSULE | Refills: 1 | Status: SHIPPED | OUTPATIENT
Start: 2025-04-17

## 2025-04-17 NOTE — TELEPHONE ENCOUNTER
PA for HYDROcodone-acetaminophen 5-325mg tablet SUBMITTED to [employer]    via    []CMM-KEY:   [x]Surescripts  []Availity-Auth ID # NDC #   []Faxed to plan   []Other website   []Phone call Case ID #     [x]PA sent as URGENT    All office notes, labs and other pertaining documents and studies sent. Clinical questions answered. Awaiting determination from insurance company.     Turnaround time for your insurance to make a decision on your Prior Authorization can take 7-21 business days.

## 2025-04-18 ENCOUNTER — TELEPHONE (OUTPATIENT)
Dept: FAMILY MEDICINE CLINIC | Facility: CLINIC | Age: 55
End: 2025-04-18

## 2025-04-18 NOTE — TELEPHONE ENCOUNTER
Patient called the RX Refill Line. Message is being forwarded to the office.     Patient called stating the pharmacy told her ins is requesting the hydrocodone to be a 30 day supply. Patient seems confused regarding what the pharmacy is telling her.     Patient has 2 pills remaining    Please call pharmacy to resolve the issue.       Please contact patient at [phone]

## 2025-04-19 DIAGNOSIS — M54.9 BACK PAIN, UNSPECIFIED BACK LOCATION, UNSPECIFIED BACK PAIN LATERALITY, UNSPECIFIED CHRONICITY: ICD-10-CM

## 2025-04-19 RX ORDER — HYDROCODONE BITARTRATE AND ACETAMINOPHEN 5; 325 MG/1; MG/1
1 TABLET ORAL EVERY 6 HOURS PRN
Qty: 120 TABLET | Refills: 0 | Status: SHIPPED | OUTPATIENT
Start: 2025-04-19 | End: 2025-04-23 | Stop reason: SDUPTHER

## 2025-04-23 DIAGNOSIS — M54.9 BACK PAIN, UNSPECIFIED BACK LOCATION, UNSPECIFIED BACK PAIN LATERALITY, UNSPECIFIED CHRONICITY: ICD-10-CM

## 2025-04-23 RX ORDER — HYDROCODONE BITARTRATE AND ACETAMINOPHEN 5; 325 MG/1; MG/1
1 TABLET ORAL EVERY 6 HOURS PRN
Qty: 120 TABLET | Refills: 0 | Status: SHIPPED | OUTPATIENT
Start: 2025-04-23

## 2025-04-23 NOTE — TELEPHONE ENCOUNTER
- Patient was only given 10 pills on 04/19/25 and the rest of the script was voided out. Patient needs this medication to be sent to a different pharmacy    Reason for call:   [x] Refill   [] Prior Auth  [] Other:     Office:   [x] PCP/Provider -   [] Specialty/Provider -     Medication:   - Hydrocodone-acetaminophen 5-325mg- take 1 tablet by mouth every 6 hours as needed      Pharmacy: [facility] [place]    Local Pharmacy   Does the patient have enough for 3 days?   [x] Yes   [] No - Send as HP to POD    Mail Away Pharmacy   Does the patient have enough for 10 days?   [] Yes   [] No - Send as HP to POD

## 2025-04-25 ENCOUNTER — TELEPHONE (OUTPATIENT)
Dept: FAMILY MEDICINE CLINIC | Facility: CLINIC | Age: 55
End: 2025-04-25

## 2025-04-25 NOTE — TELEPHONE ENCOUNTER
Duplicate encounter created, please see telephone encounter from 04/17/2025 regarding Hydrocodone PA status. Please review patient's chart to see if there is already an encounter regarding the medication in question and to document anything regarding this medication in regards to anything regarding the authorization process etc before creating another encounter Thank You.

## 2025-04-25 NOTE — TELEPHONE ENCOUNTER
Reason for call:   [ ] Refill   [ ] Prior Auth  [ ] Other:     Office:   [ ] PCP/Provider - [facility]   Authorizing Provider:  Robert Budinetz, MD      Medication: HYDROcodone-acetaminophen (Norco) 5-325 mg per tablet    Dose/Frequency: HYDROcodone-acetaminophen (Norco) 5-325 mg per tablet    Quantity: 120 tablet    Pharmacy: 120 tablet

## 2025-04-28 ENCOUNTER — TELEPHONE (OUTPATIENT)
Age: 55
End: 2025-04-28

## 2025-05-12 ENCOUNTER — TELEPHONE (OUTPATIENT)
Dept: FAMILY MEDICINE CLINIC | Facility: CLINIC | Age: 55
End: 2025-05-12

## 2025-05-12 DIAGNOSIS — G89.29 OTHER CHRONIC PAIN: Primary | ICD-10-CM

## 2025-05-12 RX ORDER — OXYCODONE AND ACETAMINOPHEN 5; 325 MG/1; MG/1
1 TABLET ORAL EVERY 4 HOURS PRN
Qty: 30 TABLET | Refills: 0 | Status: SHIPPED | OUTPATIENT
Start: 2025-05-12

## 2025-05-12 NOTE — TELEPHONE ENCOUNTER
Patient called the RX Refill Line. Message is being forwarded to the office.     Patient is calling again today about the refill for the norco 5-325. There seems to be a history of calls. There was a prior auth that was denied for the medication on 4/28/25. Please call patient to discuss. The prior auth denial was sent to Dr Budinetz on 4/28/25 and patient has still not received an update.     Contact patient at [phone]

## 2025-05-12 NOTE — TELEPHONE ENCOUNTER
Left message for patient that an alternative medication was sent in. The Norco is no longer covered under her health plan

## 2025-05-14 NOTE — TELEPHONE ENCOUNTER
Patient called and was a bit confused.  She was asking about the norco PA.  I informed her that it was denied and she asked if she could pay for it out of pocket.  I then noticed that she also had a script for percocet on her chart.  She stated that she has never taken that and thinks it was a mistake.  I read through the messages quick and told her that since the norco was denied, the dr sent a script for percocet to see if that would be covered.  She will be reaching out to pharm to see if its covered.

## 2025-05-19 ENCOUNTER — HOSPITAL ENCOUNTER (OUTPATIENT)
Dept: CT IMAGING | Facility: HOSPITAL | Age: 55
Discharge: HOME | End: 2025-05-19
Attending: FAMILY MEDICINE
Payer: COMMERCIAL

## 2025-05-19 ENCOUNTER — TELEPHONE (OUTPATIENT)
Age: 55
End: 2025-05-19

## 2025-05-19 ENCOUNTER — HOSPITAL ENCOUNTER (OUTPATIENT)
Dept: RADIOLOGY | Facility: HOSPITAL | Age: 55
Discharge: HOME | End: 2025-05-19
Payer: COMMERCIAL

## 2025-05-19 ENCOUNTER — TELEPHONE (OUTPATIENT)
Dept: FAMILY MEDICINE CLINIC | Facility: CLINIC | Age: 55
End: 2025-05-19

## 2025-05-19 ENCOUNTER — HOSPITAL ENCOUNTER (OUTPATIENT)
Dept: MRI IMAGING | Facility: HOSPITAL | Age: 55
End: 2025-05-19
Attending: FAMILY MEDICINE
Payer: COMMERCIAL

## 2025-05-19 ENCOUNTER — HOSPITAL ENCOUNTER (OUTPATIENT)
Dept: ULTRASOUND IMAGING | Facility: HOSPITAL | Age: 55
Discharge: HOME | End: 2025-05-19
Attending: FAMILY MEDICINE
Payer: COMMERCIAL

## 2025-05-19 DIAGNOSIS — M54.31 BILATERAL SCIATICA: Primary | ICD-10-CM

## 2025-05-19 DIAGNOSIS — M54.32 BILATERAL SCIATICA: ICD-10-CM

## 2025-05-19 DIAGNOSIS — Z12.2 SCREENING FOR LUNG CANCER: ICD-10-CM

## 2025-05-19 DIAGNOSIS — M54.32 BILATERAL SCIATICA: Primary | ICD-10-CM

## 2025-05-19 DIAGNOSIS — K42.9 UMBILICAL HERNIA WITHOUT OBSTRUCTION AND WITHOUT GANGRENE: ICD-10-CM

## 2025-05-19 DIAGNOSIS — M54.31 BILATERAL SCIATICA: ICD-10-CM

## 2025-05-19 PROCEDURE — 72110 X-RAY EXAM L-2 SPINE 4/>VWS: CPT

## 2025-05-19 PROCEDURE — 76705 ECHO EXAM OF ABDOMEN: CPT

## 2025-05-19 NOTE — TELEPHONE ENCOUNTER
Patient called in and states that the MRI was denied and wants to know what the next steps are. Please advise

## 2025-05-19 NOTE — TELEPHONE ENCOUNTER
Horace from [facility] called regarding the referral for US abdomen. Warm transfer to [facility].

## 2025-05-19 NOTE — TELEPHONE ENCOUNTER
Please order US abdomen limited instead of complete due to focus being on an umbilical hernia the limited is the proper study as the complete looks at the organs.

## 2025-05-21 ENCOUNTER — RESULTS FOLLOW-UP (OUTPATIENT)
Dept: FAMILY MEDICINE CLINIC | Facility: CLINIC | Age: 55
End: 2025-05-21

## 2025-05-21 DIAGNOSIS — M54.31 BILATERAL SCIATICA: Primary | ICD-10-CM

## 2025-05-21 DIAGNOSIS — M54.32 BILATERAL SCIATICA: Primary | ICD-10-CM

## 2025-05-27 ENCOUNTER — RESULTS FOLLOW-UP (OUTPATIENT)
Dept: FAMILY MEDICINE CLINIC | Facility: CLINIC | Age: 55
End: 2025-05-27

## 2025-05-27 DIAGNOSIS — K42.9 PERIUMBILICAL HERNIA: Primary | ICD-10-CM

## 2025-05-27 DIAGNOSIS — G89.29 OTHER CHRONIC PAIN: ICD-10-CM

## 2025-05-27 NOTE — TELEPHONE ENCOUNTER
Lmom with results and asking patient to return our call if she would like a referral to General Surgery.

## 2025-05-27 NOTE — TELEPHONE ENCOUNTER
----- Message from Robert Budinetz, MD sent at 5/27/2025  3:07 PM EDT -----  She does have a small umbilical hernia.  I can refer to a general surgeon if that is what she wants to do  ----- Message -----  From: Interface, Radiology Results In  Sent: 5/27/2025   2:43 PM EDT  To: Robert Budinetz, MD

## 2025-05-28 ENCOUNTER — RESULTS FOLLOW-UP (OUTPATIENT)
Dept: FAMILY MEDICINE CLINIC | Facility: CLINIC | Age: 55
End: 2025-05-28

## 2025-05-28 RX ORDER — OXYCODONE AND ACETAMINOPHEN 5; 325 MG/1; MG/1
1 TABLET ORAL EVERY 4 HOURS PRN
Qty: 30 TABLET | Refills: 0 | Status: SHIPPED | OUTPATIENT
Start: 2025-05-28

## 2025-06-01 ENCOUNTER — APPOINTMENT (EMERGENCY)
Dept: CT IMAGING | Facility: HOSPITAL | Age: 55
End: 2025-06-01
Payer: COMMERCIAL

## 2025-06-01 ENCOUNTER — HOSPITAL ENCOUNTER (EMERGENCY)
Facility: HOSPITAL | Age: 55
Discharge: HOME | End: 2025-06-01
Attending: EMERGENCY MEDICINE | Admitting: EMERGENCY MEDICINE
Payer: COMMERCIAL

## 2025-06-01 VITALS
HEIGHT: 61 IN | TEMPERATURE: 99.1 F | DIASTOLIC BLOOD PRESSURE: 86 MMHG | RESPIRATION RATE: 18 BRPM | SYSTOLIC BLOOD PRESSURE: 140 MMHG | WEIGHT: 150 LBS | OXYGEN SATURATION: 95 % | HEART RATE: 69 BPM | BODY MASS INDEX: 28.32 KG/M2

## 2025-06-01 DIAGNOSIS — R10.9 FLANK PAIN: Primary | ICD-10-CM

## 2025-06-01 LAB
ALBUMIN SERPL BCG-MCNC: 4.3 G/DL (ref 3.5–5)
ALP SERPL-CCNC: 84 U/L (ref 34–104)
ALT SERPL W P-5'-P-CCNC: 12 U/L (ref 7–52)
ANION GAP SERPL CALCULATED.3IONS-SCNC: 8 MMOL/L (ref 4–13)
AST SERPL W P-5'-P-CCNC: 15 U/L (ref 13–39)
BASOPHILS # BLD AUTO: 0.08 THOUSANDS/ÂΜL (ref 0–0.1)
BASOPHILS NFR BLD AUTO: 1 % (ref 0–1)
BILIRUB SERPL-MCNC: 0.42 MG/DL (ref 0.2–1)
BILIRUB UR QL STRIP: ABNORMAL
BUN SERPL-MCNC: 15 MG/DL (ref 5–25)
CALCIUM SERPL-MCNC: 9.5 MG/DL (ref 8.4–10.2)
CHLORIDE SERPL-SCNC: 107 MMOL/L (ref 96–108)
CLARITY UR: CLEAR
CO2 SERPL-SCNC: 28 MMOL/L (ref 21–32)
COLOR UR: YELLOW
CREAT SERPL-MCNC: 0.78 MG/DL (ref 0.6–1.3)
EOSINOPHIL # BLD AUTO: 0.17 THOUSAND/ÂΜL (ref 0–0.61)
EOSINOPHIL NFR BLD AUTO: 2 % (ref 0–6)
ERYTHROCYTE [DISTWIDTH] IN BLOOD BY AUTOMATED COUNT: 12.3 % (ref 11.6–15.1)
GFR SERPL CREATININE-BSD FRML MDRD: 86 ML/MIN/1.73SQ M
GLUCOSE SERPL-MCNC: 89 MG/DL (ref 65–140)
GLUCOSE UR STRIP-MCNC: NEGATIVE MG/DL
HCT VFR BLD AUTO: 46.7 % (ref 34.8–46.1)
HGB BLD-MCNC: 15.4 G/DL (ref 11.5–15.4)
HGB UR QL STRIP.AUTO: NEGATIVE
IMM GRANULOCYTES # BLD AUTO: 0.03 THOUSAND/UL (ref 0–0.2)
IMM GRANULOCYTES NFR BLD AUTO: 0 % (ref 0–2)
KETONES UR STRIP-MCNC: ABNORMAL MG/DL
LEUKOCYTE ESTERASE UR QL STRIP: NEGATIVE
LIPASE SERPL-CCNC: 47 U/L (ref 11–82)
LYMPHOCYTES # BLD AUTO: 2.63 THOUSANDS/ÂΜL (ref 0.6–4.47)
LYMPHOCYTES NFR BLD AUTO: 24 % (ref 14–44)
MCH RBC QN AUTO: 30.9 PG (ref 26.8–34.3)
MCHC RBC AUTO-ENTMCNC: 33 G/DL (ref 31.4–37.4)
MCV RBC AUTO: 94 FL (ref 82–98)
MONOCYTES # BLD AUTO: 0.72 THOUSAND/ÂΜL (ref 0.17–1.22)
MONOCYTES NFR BLD AUTO: 7 % (ref 4–12)
NEUTROPHILS # BLD AUTO: 7.19 THOUSANDS/ÂΜL (ref 1.85–7.62)
NEUTS SEG NFR BLD AUTO: 66 % (ref 43–75)
NITRITE UR QL STRIP: NEGATIVE
NRBC BLD AUTO-RTO: 0 /100 WBCS
PH UR STRIP.AUTO: 6.5 [PH]
PLATELET # BLD AUTO: 346 THOUSANDS/UL (ref 149–390)
PMV BLD AUTO: 9.7 FL (ref 8.9–12.7)
POTASSIUM SERPL-SCNC: 4.2 MMOL/L (ref 3.5–5.3)
PROT SERPL-MCNC: 7.1 G/DL (ref 6.4–8.4)
PROT UR STRIP-MCNC: NEGATIVE MG/DL
RBC # BLD AUTO: 4.99 MILLION/UL (ref 3.81–5.12)
SODIUM SERPL-SCNC: 143 MMOL/L (ref 135–147)
SP GR UR STRIP.AUTO: 1.02 (ref 1–1.03)
UROBILINOGEN UR QL STRIP.AUTO: 0.2 E.U./DL
WBC # BLD AUTO: 10.82 THOUSAND/UL (ref 4.31–10.16)

## 2025-06-01 PROCEDURE — 99284 EMERGENCY DEPT VISIT MOD MDM: CPT

## 2025-06-01 PROCEDURE — 96375 TX/PRO/DX INJ NEW DRUG ADDON: CPT

## 2025-06-01 PROCEDURE — 81003 URINALYSIS AUTO W/O SCOPE: CPT

## 2025-06-01 PROCEDURE — 99284 EMERGENCY DEPT VISIT MOD MDM: CPT | Performed by: EMERGENCY MEDICINE

## 2025-06-01 PROCEDURE — 96374 THER/PROPH/DIAG INJ IV PUSH: CPT

## 2025-06-01 PROCEDURE — 36415 COLL VENOUS BLD VENIPUNCTURE: CPT

## 2025-06-01 PROCEDURE — 80053 COMPREHEN METABOLIC PANEL: CPT

## 2025-06-01 PROCEDURE — 85025 COMPLETE CBC W/AUTO DIFF WBC: CPT

## 2025-06-01 PROCEDURE — 83690 ASSAY OF LIPASE: CPT

## 2025-06-01 PROCEDURE — 74176 CT ABD & PELVIS W/O CONTRAST: CPT

## 2025-06-01 RX ORDER — DIAZEPAM 5 MG/1
5 TABLET ORAL 2 TIMES DAILY
Qty: 20 TABLET | Refills: 0 | Status: SHIPPED | OUTPATIENT
Start: 2025-06-01 | End: 2025-06-11

## 2025-06-01 RX ORDER — IBUPROFEN 800 MG/1
800 TABLET, FILM COATED ORAL 3 TIMES DAILY
Qty: 21 TABLET | Refills: 0 | Status: SHIPPED | OUTPATIENT
Start: 2025-06-01

## 2025-06-01 RX ORDER — ONDANSETRON 2 MG/ML
4 INJECTION INTRAMUSCULAR; INTRAVENOUS ONCE
Status: COMPLETED | OUTPATIENT
Start: 2025-06-01 | End: 2025-06-01

## 2025-06-01 RX ORDER — KETOROLAC TROMETHAMINE 30 MG/ML
30 INJECTION, SOLUTION INTRAMUSCULAR; INTRAVENOUS ONCE
Status: COMPLETED | OUTPATIENT
Start: 2025-06-01 | End: 2025-06-01

## 2025-06-01 RX ADMIN — MORPHINE SULFATE 2 MG: 2 INJECTION, SOLUTION INTRAMUSCULAR; INTRAVENOUS at 15:21

## 2025-06-01 RX ADMIN — KETOROLAC TROMETHAMINE 30 MG: 30 INJECTION, SOLUTION INTRAMUSCULAR; INTRAVENOUS at 15:20

## 2025-06-01 RX ADMIN — ONDANSETRON 4 MG: 2 INJECTION INTRAMUSCULAR; INTRAVENOUS at 15:21

## 2025-06-01 NOTE — Clinical Note
Ayde Gotti was seen and treated in our emergency department on 6/1/2025.                Diagnosis:     Ayde  may return to work on return date.    She may return on this date: 06/03/2025         If you have any questions or concerns, please don't hesitate to call.      Aliya Alfred, DO    ______________________________           _______________          _______________  Hospital Representative                              Date                                Time

## 2025-06-01 NOTE — ED PROVIDER NOTES
Time reflects when diagnosis was documented in both MDM as applicable and the Disposition within this note       Time User Action Codes Description Comment    6/1/2025  4:45 PM Aliya Alfred Add [R10.9] Flank pain           ED Disposition       ED Disposition   Discharge    Condition   Stable    Date/Time   Sun Jun 1, 2025  4:45 PM    Comment   Ayde Gotti discharge to home/self care.                   Assessment & Plan       Medical Decision Making  Ddx: kidney stone, pyelonephritis, muscle strain    Amount and/or Complexity of Data Reviewed  Radiology: ordered.    Risk  Prescription drug management.             Medications   ketorolac (TORADOL) injection 30 mg (30 mg Intravenous Given 6/1/25 1520)   morphine injection 2 mg (2 mg Intravenous Given 6/1/25 1521)   ondansetron (ZOFRAN) injection 4 mg (4 mg Intravenous Given 6/1/25 1521)       ED Risk Strat Scores                    No data recorded        SBIRT 20yo+      Flowsheet Row Most Recent Value   Initial Alcohol Screen: US AUDIT-C     1. How often do you have a drink containing alcohol? 0 Filed at: 06/01/2025 1320   2. How many drinks containing alcohol do you have on a typical day you are drinking?  0 Filed at: 06/01/2025 1320   3a. Male UNDER 65: How often do you have five or more drinks on one occasion? 0 Filed at: 06/01/2025 1320   3b. FEMALE Any Age, or MALE 65+: How often do you have 4 or more drinks on one occassion? 0 Filed at: 06/01/2025 1320   Audit-C Score 0 Filed at: 06/01/2025 1320   PUSHPA: How many times in the past year have you...    Used an illegal drug or used a prescription medication for non-medical reasons? Never Filed at: 06/01/2025 1320                            History of Present Illness       Chief Complaint   Patient presents with    Flank Pain     Patient reports L flank pain x months. States the pain is getting worse over the past few weeks and now she is starting w/ diarrhea 3-4 days ago. Denies injury or urinary symptoms.  Hx kidney stones on R side. Patient also reports nausea.        Past Medical History[1]   Past Surgical History[2]   Family History[3]   Social History[4]   E-Cigarette/Vaping    E-Cigarette Use Current Some Day User       E-Cigarette/Vaping Substances    Nicotine No     THC No     CBD No     Flavoring No     Other No     Unknown No       I have reviewed and agree with the history as documented.     This is a 54-year-old female presenting to the ED for evaluation of left flank pain for several months.  Patient states that she has had no trauma however she began having diarrhea 3 days ago.  She has a history of kidney stones however on the right side.  She denies any fever or UTI symptoms.  Patient states her pain is 10 out of 10 and worse with movement.  She is scheduled for an MRI outpatient but states that she was unable to wait for the study because her pain intensified.        Review of Systems   Constitutional:  Negative for chills and fever.   HENT:  Negative for ear pain and sore throat.    Eyes:  Negative for pain and visual disturbance.   Respiratory:  Negative for cough and shortness of breath.    Cardiovascular:  Negative for chest pain and palpitations.   Gastrointestinal:  Negative for abdominal pain and vomiting.   Genitourinary:  Negative for dysuria and hematuria.   Musculoskeletal:  Positive for back pain. Negative for arthralgias.   Skin:  Negative for color change and rash.   Neurological:  Negative for seizures and syncope.   All other systems reviewed and are negative.          Objective       ED Triage Vitals [06/01/25 1320]   Temperature Pulse Blood Pressure Respirations SpO2 Patient Position - Orthostatic VS   99.1 °F (37.3 °C) 91 (!) 158/101 18 99 % Sitting      Temp Source Heart Rate Source BP Location FiO2 (%) Pain Score    Temporal Monitor Left arm -- 10 - Worst Possible Pain      Vitals      Date and Time Temp Pulse SpO2 Resp BP Pain Score FACES Pain Rating User   06/01/25 1630 -- 71 95  % 18 134/78 -- --    06/01/25 1618 -- 77 97 % 18 156/87 -- -- ES   06/01/25 1600 -- 69 97 % 18 132/88 -- --    06/01/25 1520 -- -- -- -- -- 10 - Worst Possible Pain --    06/01/25 1500 -- 66 96 % 18 132/75 -- --    06/01/25 1445 -- 71 97 % 18 181/86 -- --    06/01/25 1320 99.1 °F (37.3 °C) 91 99 % 18 158/101 10 - Worst Possible Pain -- NB            Physical Exam  Vitals and nursing note reviewed.   Constitutional:       General: She is in acute distress.      Appearance: Normal appearance. She is well-developed.   HENT:      Head: Normocephalic and atraumatic.      Right Ear: External ear normal.      Left Ear: External ear normal.      Nose: Nose normal. No congestion.      Mouth/Throat:      Mouth: Mucous membranes are moist.     Eyes:      Extraocular Movements: Extraocular movements intact.      Conjunctiva/sclera: Conjunctivae normal.       Cardiovascular:      Rate and Rhythm: Normal rate and regular rhythm.      Heart sounds: No murmur heard.  Pulmonary:      Effort: Pulmonary effort is normal. No respiratory distress.      Breath sounds: Normal breath sounds.   Abdominal:      General: Abdomen is flat.      Palpations: Abdomen is soft.      Tenderness: There is no abdominal tenderness. There is left CVA tenderness.     Musculoskeletal:         General: No swelling. Normal range of motion.      Cervical back: Normal range of motion and neck supple.     Skin:     General: Skin is warm and dry.      Capillary Refill: Capillary refill takes less than 2 seconds.     Neurological:      General: No focal deficit present.      Mental Status: She is alert and oriented to person, place, and time. Mental status is at baseline.     Psychiatric:         Mood and Affect: Mood normal.         Results Reviewed       Procedure Component Value Units Date/Time    UA w Reflex to Microscopic w Reflex to Culture [[id number]]  (Abnormal) Collected: 06/01/25 1527    Lab Status: Final result Specimen: Urine, Clean Catch  Updated: 06/01/25 1532     Color, UA Yellow     Clarity, UA Clear     Specific Gravity, UA 1.025     pH, UA 6.5     Leukocytes, UA Negative     Nitrite, UA Negative     Protein, UA Negative mg/dl      Glucose, UA Negative mg/dl      Ketones, UA Trace mg/dl      Urobilinogen, UA 0.2 E.U./dl      Bilirubin, UA Small     Occult Blood, UA Negative    Comprehensive metabolic panel [[id number]] Collected: 06/01/25 1329    Lab Status: Final result Specimen: Blood from Arm, Left Updated: 06/01/25 1355     Sodium 143 mmol/L      Potassium 4.2 mmol/L      Chloride 107 mmol/L      CO2 28 mmol/L      ANION GAP 8 mmol/L      BUN 15 mg/dL      Creatinine 0.78 mg/dL      Glucose 89 mg/dL      Calcium 9.5 mg/dL      AST 15 U/L      ALT 12 U/L      Alkaline Phosphatase 84 U/L      Total Protein 7.1 g/dL      Albumin 4.3 g/dL      Total Bilirubin 0.42 mg/dL      eGFR 86 ml/min/1.73sq m     Narrative:      National Kidney Disease Foundation guidelines for Chronic Kidney Disease (CKD):     Stage 1 with normal or high GFR (GFR > 90 mL/min/1.73 square meters)    Stage 2 Mild CKD (GFR = 60-89 mL/min/1.73 square meters)    Stage 3A Moderate CKD (GFR = 45-59 mL/min/1.73 square meters)    Stage 3B Moderate CKD (GFR = 30-44 mL/min/1.73 square meters)    Stage 4 Severe CKD (GFR = 15-29 mL/min/1.73 square meters)    Stage 5 End Stage CKD (GFR <15 mL/min/1.73 square meters)  Note: GFR calculation is accurate only with a steady state creatinine    Lipase [[id number]]  (Normal) Collected: 06/01/25 1329    Lab Status: Final result Specimen: Blood from Arm, Left Updated: 06/01/25 1355     Lipase 47 u/L     CBC and differential [[id number]]  (Abnormal) Collected: 06/01/25 1329    Lab Status: Final result Specimen: Blood from Arm, Left Updated: 06/01/25 1334     WBC 10.82 Thousand/uL      RBC 4.99 Million/uL      Hemoglobin 15.4 g/dL      Hematocrit 46.7 %      MCV 94 fL      MCH 30.9 pg      MCHC 33.0 g/dL      RDW 12.3 %      MPV 9.7 fL      Platelets  346 Thousands/uL      nRBC 0 /100 WBCs      Segmented % 66 %      Immature Grans % 0 %      Lymphocytes % 24 %      Monocytes % 7 %      Eosinophils Relative 2 %      Basophils Relative 1 %      Absolute Neutrophils 7.19 Thousands/µL      Absolute Immature Grans 0.03 Thousand/uL      Absolute Lymphocytes 2.63 Thousands/µL      Absolute Monocytes 0.72 Thousand/µL      Eosinophils Absolute 0.17 Thousand/µL      Basophils Absolute 0.08 Thousands/µL             CT renal stone study abdomen pelvis wo contrast   Final Interpretation by Christin Phan MD (06/01 1630)      No CT findings to explain patient's symptoms.   15 mm right adrenal nodule stable since 2022. Although its imaging features on this study is indeterminate, because this lesion has been stable for > 1 year, it is considered benign. However, please note that for adrenal nodule > 1 cm, biochemical    evaluation is suggested to rule out functioning adenoma, if not already performed.   Adrenal recommendation based on institutional consensus and Journal of American College of Radiology 2017;14:4705-8290.         Workstation performed: [id number]             Procedures    ED Medication and Procedure Management   Prior to Admission Medications   Prescriptions Last Dose Informant Patient Reported? Taking?   ASCORBIC ACID PO   Yes No   Sig: Take by mouth   Ascorbic Acid, Vitamin C, (VITAMIN C) 100 MG tablet   Yes No   Sig: Take by mouth   Cholecalciferol 50 MCG (2000 UT) CAPS   Yes No   Sig: Take by mouth   DULoxetine (CYMBALTA) 30 mg delayed release capsule   No No   Sig: Take 1 capsule (30 mg total) by mouth daily   HYDROcodone-acetaminophen (Norco) 5-325 mg per tablet   No No   Sig: Take 1 tablet by mouth every 6 (six) hours as needed for pain Max Daily Amount: 4 tablets   MAGNESIUM-OXIDE PO   Yes No   Sig: Take by mouth   MULTIPLE VITAMINS PO   Yes No   Sig: Take by mouth   NON FORMULARY   Yes No   Sig: Medical Marijuana   Omega-3 Fatty Acids (fish oil)  1,000 mg   Yes No   Sig: Take by mouth   ergocalciferol (VITAMIN D2) 50,000 units   No No   Sig: take 1 capsule by mouth every week   ibuprofen (MOTRIN) 200 mg tablet   Yes No   Sig: Take by mouth every 6 (six) hours as needed for mild pain   levothyroxine 25 mcg tablet   Yes No   Sig: Take 1 tablet by mouth daily before breakfast   losartan (COZAAR) 100 MG tablet   Yes No   Sig: Take 100 mg by mouth daily   omeprazole (PriLOSEC) 20 mg delayed release capsule   No No   Sig: Take 1 capsule (20 mg total) by mouth daily   ondansetron (ZOFRAN-ODT) 4 mg disintegrating tablet   No No   Sig: Take 1 tablet (4 mg total) by mouth every 6 (six) hours as needed for nausea or vomiting   oxyCODONE-acetaminophen (Percocet) 5-325 mg per tablet   No No   Sig: Take 1 tablet by mouth every 4 (four) hours as needed for moderate pain Max Daily Amount: 6 tablets      Facility-Administered Medications: None     Patient's Medications   Discharge Prescriptions    DIAZEPAM (VALIUM) 5 MG TABLET    Take 1 tablet (5 mg total) by mouth 2 (two) times a day for 10 days       Start Date: 6/1/2025  End Date: 6/11/2025       Order Dose: 5 mg       Quantity: 20 tablet    Refills: 0    IBUPROFEN (MOTRIN) 800 MG TABLET    Take 1 tablet (800 mg total) by mouth 3 (three) times a day       Start Date: 6/1/2025  End Date: --       Order Dose: 800 mg       Quantity: 21 tablet    Refills: 0     No discharge procedures on file.  ED SEPSIS DOCUMENTATION   Time reflects when diagnosis was documented in both MDM as applicable and the Disposition within this note       Time User Action Codes Description Comment    6/1/2025  4:45 PM Aliya Alfred Add [R10.9] Flank pain                      [1]   Past Medical History:  Diagnosis Date    Chronic bronchitis (HCC)     Fibromyalgia     Hypertension     Kidney stone     Lupus 2017    Migraines     Thoracic outlet syndrome    [2]   Past Surgical History:  Procedure Laterality Date    APPENDECTOMY      ENDOMETRIAL  ABLATION      FL RETROGRADE PYELOGRAM  11/27/2022    FL RETROGRADE PYELOGRAM  2/14/2023    KNEE SURGERY      AL CYSTO/URETERO W/LITHOTRIPSY &INDWELL STENT INSRT Right 2/14/2023    Procedure: CYSTO USCOPE W/  LASER, & STENT EXCHANGE;  Surgeon: Nikolas Renteria MD;  Location: [facility];  Service: Urology    AL CYSTOURETHROSCOPY W/URETERAL CATHETERIZATION Right 11/26/2022    Procedure: CYSTOSCOPY RETROGRADE PYELOGRAM WITH INSERTION STENT URETERAL;  Surgeon: Dipti Novak MD;  Location: [facility];  Service: Urology    THORACIC SYMPATHETECTOMY     [3]   Family History  Problem Relation Name Age of Onset    Osteoporosis Mother      Cancer Father Hemal     No Known Problems Sister      No Known Problems Daughter      Breast cancer Maternal Grandmother      No Known Problems Maternal Grandfather      Breast cancer Paternal Grandmother      No Known Problems Paternal Grandfather      No Known Problems Paternal Aunt     [4]   Social History  Tobacco Use    Smoking status: Every Day     Current packs/day: 0.25     Average packs/day: 0.6 packs/day for 40.0 years (25.0 ttl pk-yrs)     Types: Cigarettes     Passive exposure: Current    Smokeless tobacco: Never    Tobacco comments:     Last cigarette 2/14/23 1000, last marijuana 4 days ago   Vaping Use    Vaping status: Some Days   Substance Use Topics    Alcohol use: Yes     Alcohol/week: 5.0 standard drinks of alcohol     Types: 5 Standard drinks or equivalent per week     Comment: Social    Drug use: Yes     Frequency: 2.0 times per week     Types: Marijuana     Comment: Marijuana tincture- has medical marijuana card        Aliya Alfred DO  06/01/25 [id number]

## 2025-06-05 DIAGNOSIS — M54.42 CHRONIC BILATERAL LOW BACK PAIN WITH BILATERAL SCIATICA: Primary | ICD-10-CM

## 2025-06-05 DIAGNOSIS — M54.41 CHRONIC BILATERAL LOW BACK PAIN WITH BILATERAL SCIATICA: Primary | ICD-10-CM

## 2025-06-05 DIAGNOSIS — G89.29 CHRONIC BILATERAL LOW BACK PAIN WITH BILATERAL SCIATICA: Primary | ICD-10-CM

## 2025-06-11 DIAGNOSIS — G89.29 OTHER CHRONIC PAIN: ICD-10-CM

## 2025-06-11 RX ORDER — OXYCODONE AND ACETAMINOPHEN 5; 325 MG/1; MG/1
1 TABLET ORAL EVERY 4 HOURS PRN
Qty: 30 TABLET | Refills: 0 | Status: SHIPPED | OUTPATIENT
Start: 2025-06-11

## 2025-06-11 NOTE — TELEPHONE ENCOUNTER
Patient requesting fill    Knows it is early, just asking for the fill to be placed so she can  when due

## 2025-06-16 ENCOUNTER — APPOINTMENT (OUTPATIENT)
Dept: LAB | Facility: HOSPITAL | Age: 55
End: 2025-06-16
Payer: COMMERCIAL

## 2025-06-16 ENCOUNTER — EVALUATION (OUTPATIENT)
Dept: PHYSICAL THERAPY | Facility: CLINIC | Age: 55
End: 2025-06-16
Payer: COMMERCIAL

## 2025-06-16 DIAGNOSIS — K42.9 RECURRENT UMBILICAL HERNIA: ICD-10-CM

## 2025-06-16 DIAGNOSIS — M54.16 LUMBAR RADICULOPATHY: Primary | ICD-10-CM

## 2025-06-16 LAB
ANION GAP SERPL CALCULATED.3IONS-SCNC: 5 MMOL/L (ref 4–13)
ATRIAL RATE: 59 BPM
BUN SERPL-MCNC: 14 MG/DL (ref 5–25)
CALCIUM SERPL-MCNC: 9.4 MG/DL (ref 8.4–10.2)
CHLORIDE SERPL-SCNC: 106 MMOL/L (ref 96–108)
CO2 SERPL-SCNC: 30 MMOL/L (ref 21–32)
CREAT SERPL-MCNC: 0.74 MG/DL (ref 0.6–1.3)
ERYTHROCYTE [DISTWIDTH] IN BLOOD BY AUTOMATED COUNT: 12.4 % (ref 11.6–15.1)
GFR SERPL CREATININE-BSD FRML MDRD: 91 ML/MIN/1.73SQ M
GLUCOSE SERPL-MCNC: 81 MG/DL (ref 65–140)
HCT VFR BLD AUTO: 45.3 % (ref 34.8–46.1)
HGB BLD-MCNC: 14.8 G/DL (ref 11.5–15.4)
MCH RBC QN AUTO: 31 PG (ref 26.8–34.3)
MCHC RBC AUTO-ENTMCNC: 32.7 G/DL (ref 31.4–37.4)
MCV RBC AUTO: 95 FL (ref 82–98)
P AXIS: -42 DEGREES
PLATELET # BLD AUTO: 277 THOUSANDS/UL (ref 149–390)
PMV BLD AUTO: 10.5 FL (ref 8.9–12.7)
POTASSIUM SERPL-SCNC: 4.7 MMOL/L (ref 3.5–5.3)
PR INTERVAL: 144 MS
QRS AXIS: 30 DEGREES
QRSD INTERVAL: 80 MS
QT INTERVAL: 430 MS
QTC INTERVAL: 425 MS
RBC # BLD AUTO: 4.78 MILLION/UL (ref 3.81–5.12)
SODIUM SERPL-SCNC: 141 MMOL/L (ref 135–147)
T WAVE AXIS: 47 DEGREES
VENTRICULAR RATE: 59 BPM
WBC # BLD AUTO: 6.6 THOUSAND/UL (ref 4.31–10.16)

## 2025-06-16 PROCEDURE — 85027 COMPLETE CBC AUTOMATED: CPT

## 2025-06-16 PROCEDURE — 97162 PT EVAL MOD COMPLEX 30 MIN: CPT | Performed by: PHYSICAL THERAPIST

## 2025-06-16 PROCEDURE — 80048 BASIC METABOLIC PNL TOTAL CA: CPT

## 2025-06-16 PROCEDURE — 36415 COLL VENOUS BLD VENIPUNCTURE: CPT

## 2025-06-16 PROCEDURE — 97110 THERAPEUTIC EXERCISES: CPT | Performed by: PHYSICAL THERAPIST

## 2025-06-16 PROCEDURE — 93010 ELECTROCARDIOGRAM REPORT: CPT | Performed by: INTERNAL MEDICINE

## 2025-06-16 NOTE — HOME EXERCISE EDUCATION
Program_ID:[id number]   Access Code: [email or web address]  URL: [email or web address]  Date: 06-  Prepared By: Bruno Carter    Program Notes      Exercises      - Bird Dog - 1 x daily - 7 x weekly - 3 sets - 10 reps      - Hooklying Single Knee to Chest Stretch - 1 x daily - 7 x weekly - 3 sets - 10 reps      - Supine Lower Trunk Rotation - 1 x daily - 7 x weekly - 3 sets - 10 reps

## 2025-06-16 NOTE — PROGRESS NOTES
PT Evaluation     Today's date: 2025  Patient name: Ayde Gotti  : 1970  MRN: [id number]  Referring provider: Budinetz, Robert, MD  Dx:   Encounter Diagnosis     ICD-10-CM    1. Lumbar radiculopathy  M54.16                      Assessment  Symptom irritability: high    Assessment details: Pt presents to PT with chronic low back and leg pain. Functionally is limited with standing and walking tolerance, sitting tolerance, and has pain with trying to sleep. Objectively she has flexion preference and high irritability level. She will do best with focus on graded exposure and desensitization. Skilled PT warranted to address findings and progress towards outlined goals. Pt in agreement with current POC.    Understanding of Dx/Px/POC: good     Prognosis: fair    Goals  STG 3-4 weeks  Lumbar pain avg of 5/10 with daily activities and transfers  Ambulate for 30 minutes prior to needing break  Improve multi-seg ext and rot to complete with minimal discomfort        LT-8 weeks  Demonstrate proper lifting techniques at lumbar spine  avg pain of 4/10 or less with adls and transfers  Improve standing tolerance to 30 minutes prior to needing break  Improve LE strength testing by one full numerical grade from IE  Decrease pain at night by 50%        Plan  Patient would benefit from: skilled physical therapy  Planned modality interventions: cryotherapy and thermotherapy: hydrocollator packs    Planned therapy interventions: manual therapy, neuromuscular re-education, self care, therapeutic activities, therapeutic exercise and home exercise program    Frequency: 2x week  Duration in weeks: 6  Plan of Care beginning date: 2025  Plan of Care expiration date: 2025  Treatment plan discussed with: patient  Plan details: TE, NMR, TA, MT, self education, and modalities as needed in order to progress through skilled PT focused on  ROM, strength, balance, coordination             Subjective  Evaluation    History of Present Illness  Mechanism of injury: 55 year old presenting to PT with chronic low back and last year has been slightly up to side of flank. At times goes into lateral thigh and other times has cramping throughout both legs. Has had injections in back prior with last round being 2019 for sacroiliitis.     She works at local [employer] and has to lift and carry.    Back pain bothers her the most with sleeping, standing/walking, sitting in upright posture.     Hx of fibromyalgia. Denies any bowel/bladder. Denies saddle anesthesia. No prior surgeries of cervical, lumbar, hip. Feels sensitive to hot/cold.   Patient Goals  Patient goals for therapy: decreased pain, increased motion, increased strength, independence with ADLs/IADLs and return to sport/leisure activities  Patient goal: pick things up, dressing without pain - sleeping with less pain  Pain  At best pain ratin  At worst pain ratin        Objective    Observation/Gait  Pain with transfers      Multi-seg movement patterns  Flex: to toes  Ext: unable to work past neutral  SB: distal femur b/l  ROT: minimal dis-association      Lumbar  Very sensitive through lumbar cpa - unable to tolerate even light touch throughout    Very sensitive to palpation through iliac crest, glut med, ql, proximal buttock b/l      Hip screen  Symptoms varied per position - at times ER painful, other times not    Light touch: slight asymmetry noted L compared to R    Strength/myotome  Hip : - NT    Knee ext: L painful 4/5, R 4+/5  Knee flex: L 4+/5 w/ pain, R 5/5  Ankle DF: L 4+/5 throughout, R 5/5   Great toe 4+/5 b/l      DTR's  2+ L4 and S1 b/l    Passive SLR: limited           Precautions: chronic low back pain, high symptoms irritability - fibromyalgia         Pertinent Findings:      POC End Date: 25                                                                                          Test / Measure     FOTO (Predicted NV) NV  "  Multi-seg ext and ROT [place] ri[place]   High irritability with light palpation Post chain               Manuals 6/16                         LAD Trialed but d/c due to pain                                      Neuro Re-Ed                                                                                                        Ther Ex                                                                 LTR x15            sktc 8x10\" ea            Bird dog x10            education Provided written HEP and reviewed            Ther Activity                                       Gait Training                                       Modalities                                            "

## 2025-06-17 PROCEDURE — 97162 PT EVAL MOD COMPLEX 30 MIN: CPT | Performed by: PHYSICAL THERAPIST

## 2025-06-24 ENCOUNTER — OFFICE VISIT (OUTPATIENT)
Dept: PHYSICAL THERAPY | Facility: CLINIC | Age: 55
End: 2025-06-24
Attending: FAMILY MEDICINE
Payer: COMMERCIAL

## 2025-06-24 DIAGNOSIS — M54.16 LUMBAR RADICULOPATHY: Primary | ICD-10-CM

## 2025-06-24 PROCEDURE — 97112 NEUROMUSCULAR REEDUCATION: CPT

## 2025-06-24 PROCEDURE — 97110 THERAPEUTIC EXERCISES: CPT

## 2025-06-24 NOTE — PROGRESS NOTES
"Daily Note     Today's date: 2025  Patient name: Ayde Gotti  : 1970  MRN: [id number]  Referring provider: Budinetz, Robert, MD  Dx:   Encounter Diagnosis     ICD-10-CM    1. Lumbar radiculopathy  M54.16           Start Time: 1100  Stop Time: 1145  Total time in clinic (min): 45 minutes    Subjective: Ayde reports she was in a lot of pain post her IE. States it took her the rest of the day to recover. Reports no change in her LBP that travels down into her L leg. Notes she went horse back ridding over the weekend. Not hard but gentle and may be a little stiff from that on the inside of her legs.       Objective: See treatment diary below      Assessment: Ayde Tolerated treatment fair. She was challenged with her program today. Unable to tolerate any position for very long before experiencing pain down into her L LE. Worked on gentle LE stretching and light core strengthening with minimal tolerance more so on L side than right. Recommended gentle stretching post heat or hot shower. Required vc's t/o session for proper positioning with ex's. She  would benefit from continued PT and compliance with HEP.        Plan: Continue per plan of care.      Precautions: chronic low back pain, high symptoms irritability - fibromyalgia         Pertinent Findings:      POC End Date: 25                                                                                          Test / Measure     FOTO (Predicted NV) NV   Multi-seg ext and ROT del rio   High irritability with light palpation Post chain               Manuals                         LAD Trialed but d/c due to pain /                                     Neuro Re-Ed             TVA  3'x10            TVA + add   3\"x10            Supine clams   NV           Supine marching   NV           Sciatic nerve glide   Slump 10x ea                                      Ther Ex             NS   L1    4min            Piriformis stretch   15'x3 " "tri            HSS   90/90  15\"x5  R only                         LTR x15 X20            sktc 8x10\" ea 10\"x5 ea            Bird dog x10 Unable            education Provided written HEP and reviewed            Ther Activity                                       Gait Training                                       Modalities                                            "

## 2025-06-30 DIAGNOSIS — G89.29 OTHER CHRONIC PAIN: ICD-10-CM

## 2025-06-30 RX ORDER — OXYCODONE AND ACETAMINOPHEN 5; 325 MG/1; MG/1
1 TABLET ORAL EVERY 4 HOURS PRN
Qty: 30 TABLET | Refills: 0 | Status: SHIPPED | OUTPATIENT
Start: 2025-06-30

## 2025-06-30 NOTE — TELEPHONE ENCOUNTER
Reason for call:   [x] Refill   [] Prior Auth  [] Other:     Office:   [x] PCP/Provider -  Robert Budinetz, MD  [] Specialty/Provider -     Medication: oxyCODONE-acetaminophen (Percocet) 5-325 mg per tablet     Dose/Frequency: Take 1 tablet by mouth every 4 (four) hours as needed for moderate pain Max Daily Amount: 6 tablets     Quantity: 30 tablet (5 day supply)     Pharmacy: [facility]/pharm[facility] #[id number]     Local Pharmacy   Does the patient have enough for 3 days?   [] Yes   [x] No - Send as HP to POD    Mail Away Pharmacy   Does the patient have enough for 10 days?   [] Yes   [] No - Send as HP to POD

## 2025-07-01 ENCOUNTER — ANESTHESIA EVENT (OUTPATIENT)
Dept: PERIOP | Facility: HOSPITAL | Age: 55
End: 2025-07-01
Payer: COMMERCIAL

## 2025-07-01 ENCOUNTER — OFFICE VISIT (OUTPATIENT)
Dept: PHYSICAL THERAPY | Facility: CLINIC | Age: 55
End: 2025-07-01
Attending: FAMILY MEDICINE
Payer: COMMERCIAL

## 2025-07-01 DIAGNOSIS — M54.16 LUMBAR RADICULOPATHY: Primary | ICD-10-CM

## 2025-07-01 PROCEDURE — 97112 NEUROMUSCULAR REEDUCATION: CPT | Performed by: PHYSICAL THERAPIST

## 2025-07-01 PROCEDURE — 97110 THERAPEUTIC EXERCISES: CPT | Performed by: PHYSICAL THERAPIST

## 2025-07-01 NOTE — PRE-PROCEDURE INSTRUCTIONS
Pre-Surgery Instructions:   Medication Instructions    Ascorbic Acid, Vitamin C, (VITAMIN C) 100 MG tablet Stop taking 7 days prior to surgery.    diazepam (VALIUM) 5 mg tablet Uses PRN- OK to take day of surgery    DULoxetine (CYMBALTA) 30 mg delayed release capsule Take day of surgery.    ergocalciferol (VITAMIN D2) 50,000 units Stop taking 7 days prior to surgery.    ibuprofen (MOTRIN) 800 mg tablet Stop taking 3 days prior to surgery.    levothyroxine 25 mcg tablet Take day of surgery.    losartan (COZAAR) 100 MG tablet Hold day of surgery.    MULTIPLE VITAMINS PO Stop taking 7 days prior to surgery.    Niacinamide (VITAMIN B3 PO) Stop taking 7 days prior to surgery.    NON FORMULARY Hold day of surgery.    omeprazole (PriLOSEC) 20 mg delayed release capsule Take day of surgery.    oxyCODONE-acetaminophen (Percocet) 5-325 mg per tablet Uses PRN- OK to take day of surgery   Medication instructions for day of surgery reviewed. Please take all instructed medications with only a sip of water. Please do not take any over the counter (non-prescribed) vitamins or supplements for one week prior to date of surgery.      You will receive a call one business day prior to surgery with an arrival time and hospital directions. If your surgery is scheduled on a Monday, the hospital will be calling you on the Friday prior to your surgery. If you have not heard from anyone by 8pm, please call the hospital supervisor through the hospital  at [phone]. ([place] [phone] or [place] [phone]).    Do not eat or drink anything after midnight the night before your surgery, including candy, mints, lifesavers, or chewing gum. Do not drink alcohol 24hrs before your surgery. Try not to smoke at least 24hrs before your surgery.       Follow the pre surgery showering instructions as listed in the “My Surgical Experience Booklet” or otherwise provided by your surgeon's office. Do not use a blade to shave the surgical  area 1 week before surgery. It is okay to use a clean electric clippers up to 24 hours before surgery. Do not apply any lotions, creams, including makeup, cologne, deodorant, or perfumes after showering on the day of your surgery. Do not use dry shampoo, hair spray, hair gel, or any type of hair products.     No contact lenses, eye make-up, or artificial eyelashes. Remove nail polish, including gel polish, and any artificial, gel, or acrylic nails if possible. Remove all jewelry including rings and body piercing jewelry.     Wear causal clothing that is easy to take on and off. Consider your type of surgery.    Keep any valuables, jewelry, piercings at home. Please bring any specially ordered equipment (sling, braces) if indicated.    Arrange for a responsible person to drive you to and from the hospital on the day of your surgery. Please confirm the visitor policy for the day of your procedure when you receive your phone call with an arrival time.     Call the surgeon's office with any new illnesses, exposures, or additional questions prior to surgery.    Please reference your “My Surgical Experience Booklet” for additional information to prepare for your upcoming surgery.

## 2025-07-01 NOTE — PROGRESS NOTES
"Daily Note     Today's date: 2025  Patient name: Ayde Gotti  : 1970  MRN: [id number]  Referring provider: Budinetz, Robert, MD  Dx:   Encounter Diagnosis     ICD-10-CM    1. Lumbar radiculopathy  M54.16                      Subjective: was very sore after last visit. Had to call off work next day. Today everything hurts.       Objective: multi-seg flex: to toes, ext pain past neutral, SB discomfort b/l      Assessment: poor tolerance for all types of TE as pain noted with all activity. Cramping noted with sit-stands throughout low back. Tolerated treatment poor. Patient would benefit from continued PT      Plan: Continue per plan of care. If no change in 2-3 visits will have to put on hold     Precautions: chronic low back pain, high symptoms irritability - fibromyalgia         Pertinent Findings:      POC End Date: 25                                                                                          Test / Measure     FOTO (Predicted NV) NV   Multi-seg ext and ROT del rio   High irritability with light palpation Post chain               Manuals                        LAD Trialed but d/c due to pain /                                     Neuro Re-Ed             TVA  3'x10  5\" x10          TVA + add   3\"x10            Supine clams   NV P!          Supine marching   NV           Sciatic nerve glide   Slump 10x ea  D/c          Standing rows   Dillon tb x30          Standing ela   Gtb x30          Ther Ex             NS   L1    4min  L1 6' - ROM/endurance          Piriformis stretch   15'x3 ea            HSS   90/90  15\"x5  R only            Hip ext   Table wedge - 2x10          Lat walks   Gtb 2x8          Step-ups   6\" x10 ea, 8\" x10 ea          Seated roll outs   Unable - pain          LTR x15 X20            sktc 8x10\" ea 10\"x5 ea            Bird dog x10 Unable            education Provided written HEP and reviewed            Ther Activity                                "        Gait Training                                       Modalities

## 2025-07-08 ENCOUNTER — OFFICE VISIT (OUTPATIENT)
Dept: PHYSICAL THERAPY | Facility: CLINIC | Age: 55
End: 2025-07-08
Attending: FAMILY MEDICINE
Payer: COMMERCIAL

## 2025-07-08 DIAGNOSIS — M54.16 LUMBAR RADICULOPATHY: Primary | ICD-10-CM

## 2025-07-08 PROCEDURE — 97110 THERAPEUTIC EXERCISES: CPT

## 2025-07-08 PROCEDURE — 97112 NEUROMUSCULAR REEDUCATION: CPT

## 2025-07-08 RX ORDER — HYDROMORPHONE HCL/PF 1 MG/ML
0.5 SYRINGE (ML) INJECTION
OUTPATIENT
Start: 2025-07-08

## 2025-07-08 NOTE — PROGRESS NOTES
"Daily Note     Today's date: 2025  Patient name: Ayde Gotti  : 1970  MRN: [id number]  Referring provider: Budinetz, Robert, MD  Dx:   Encounter Diagnosis     ICD-10-CM    1. Lumbar radiculopathy  M54.16                      Subjective: Patient noted she has surgery scheduled for tomorrow. Patient noted soreness post last treatment lasting for a few days.       Objective: See treatment diary below      Assessment: Patient tolerated treatment fair to poor noting pain with most TE except standing TB rows and extension. Patient noted cramping and fatigue in LLE  throughout treatment with performing exercises. Modified treatment reps to patient tolerance throughout treatment. Patient noted increased pain with lateral GTB stepping last visit but able to perform without TB today. Shortened treatment due to patient tolerance and noted pain with exercises, cramping, and fatigue.       Plan: Continue per plan of care.      Precautions: chronic low back pain, high symptoms irritability - fibromyalgia         Pertinent Findings:      POC End Date: 25                                                                                          Test / Measure     FOTO (Predicted NV) NV   Multi-seg ext and ROT del rio   High irritability with light palpation Post chain               Manuals                       LAD Trialed but d/c due to pain /                                     Neuro Re-Ed             TVA  3'x10  5\" x10 5\" x 10         TVA + add   3\"x10   X 10         Supine clams   NV P!          Supine marching   NV           Sciatic nerve glide   Slump 10x ea  D/c          Standing rows   Dillon tb x30 Dillon tb x 30         Standing ela   Gtb x30 GTB x 30         Ther Ex             NS   L1    4min  L1 6' - ROM/endurance L1 2 min   Rec bike 3 min          Piriformis stretch   15'x3 ea            HSS   90/90  15\"x5  R only            Hip ext   Table wedge - 2x10 Table wedge  x 10 L  X 15 " "R         Lat walks   Gtb 2x8 No TB 3 laps @ table          Step-ups   6\" x10 ea, 8\" x10 ea 6\" 2 x 10 ea         Seated roll outs   Unable - pain          LTR x15 X20   X 1         sktc 8x10\" ea 10\"x5 ea            Bird dog x10 Unable            education Provided written HEP and reviewed            Ther Activity                                       Gait Training                                       Modalities                                                "

## 2025-07-08 NOTE — ANESTHESIA PREPROCEDURE EVALUATION
Procedure:  ROBOTIC ASSISTED LAPAROSCOPIC UMBILICAL HERNIA REPAIR WITH MESH; POSSIBLE OPEN (Abdomen)    Relevant Problems   CARDIO   (+) Chest pain   (+) Frequent PVCs   (+) Hypertension   (+) Migraines      ENDO   (+) Hypothyroidism due to Hashimoto thyroiditis      GI/HEPATIC   (+) Gastroesophageal reflux disease without esophagitis      /RENAL   (+) Hydroureteronephrosis      MUSCULOSKELETAL   (+) Fibromyalgia      NEURO/PSYCH   (+) Fibromyalgia   (+) Migraines      PULMONARY   (+) Centrilobular emphysema (HCC)      Sinus bradycardia  Septal infarct , age undetermined  Abnormal ECG  Previous ECG showed PVCs       Physical Exam    Airway     Mallampati score: II  TM Distance: >3 FB  Neck ROM: full      Cardiovascular  Cardiovascular exam normal    Dental   No notable dental hx     Pulmonary  Pulmonary exam normal     Neurological  - normal exam    Other Findings  post-pubertal.    Patient was noted to have frequent PVCs.    Left Ventricle: Left ventricle is normal in size with normal systolic function.  Estimated LVEF is 65%.  Wall thickness is mildly increased.  No regional wall motion abnormality noted.    Atrial Septum: There is a moderately-sized septal aneurysm.  No shunting noted on color Doppler study.    Aortic Valve: Aortic valve is not well-visualized but appears mildly sclerotic. There is mild regurgitation with a centrally directed jet.    Mitral Valve: There is mild regurgitation.    No comparison study available.          Anesthesia Plan  ASA Score- 3     Anesthesia Type- general with ASA Monitors.         Additional Monitors:     Airway Plan: Oral ETT.           Plan Factors-Exercise tolerance (METS): >4 METS.    Chart reviewed. EKG reviewed. Imaging results reviewed. Existing labs reviewed. Patient summary reviewed.    Patient is a current smoker.      Obstructive sleep apnea risk education given perioperatively.        Induction- intravenous.    Postoperative Plan- Plan for postoperative opioid  use.   Monitoring Plan - Monitoring plan - standard ASA monitoring  Post Operative Pain Plan - plan for postoperative opioid use    Perioperative Resuscitation Plan - Level 1 - Full Code.       Informed Consent- Anesthetic plan and risks discussed with patient.  I personally reviewed this patient with the CRNA. Discussed and agreed on the Anesthesia Plan with the CRNA..      NPO Status:  No vitals data found for the desired time range.

## 2025-07-09 ENCOUNTER — HOSPITAL ENCOUNTER (OUTPATIENT)
Facility: HOSPITAL | Age: 55
Setting detail: OUTPATIENT SURGERY
Discharge: HOME | End: 2025-07-09
Attending: SURGERY | Admitting: SURGERY
Payer: COMMERCIAL

## 2025-07-09 ENCOUNTER — HOSPITAL ENCOUNTER (EMERGENCY)
Facility: HOSPITAL | Age: 55
Discharge: HOME | End: 2025-07-09
Attending: EMERGENCY MEDICINE | Admitting: EMERGENCY MEDICINE
Payer: COMMERCIAL

## 2025-07-09 ENCOUNTER — APPOINTMENT (EMERGENCY)
Dept: CT IMAGING | Facility: HOSPITAL | Age: 55
End: 2025-07-09
Payer: COMMERCIAL

## 2025-07-09 ENCOUNTER — ANESTHESIA (OUTPATIENT)
Dept: PERIOP | Facility: HOSPITAL | Age: 55
End: 2025-07-09
Payer: COMMERCIAL

## 2025-07-09 VITALS
TEMPERATURE: 97.3 F | RESPIRATION RATE: 17 BRPM | BODY MASS INDEX: 28.33 KG/M2 | WEIGHT: 149.91 LBS | HEART RATE: 59 BPM | OXYGEN SATURATION: 93 % | DIASTOLIC BLOOD PRESSURE: 81 MMHG | SYSTOLIC BLOOD PRESSURE: 143 MMHG

## 2025-07-09 VITALS
WEIGHT: 150 LBS | SYSTOLIC BLOOD PRESSURE: 184 MMHG | OXYGEN SATURATION: 99 % | BODY MASS INDEX: 28.32 KG/M2 | HEIGHT: 61 IN | DIASTOLIC BLOOD PRESSURE: 93 MMHG | RESPIRATION RATE: 20 BRPM | HEART RATE: 70 BPM | TEMPERATURE: 97.4 F

## 2025-07-09 DIAGNOSIS — R10.9 ABDOMINAL PAIN: Primary | ICD-10-CM

## 2025-07-09 LAB
ALBUMIN SERPL BCG-MCNC: 4.2 G/DL (ref 3.5–5)
ALP SERPL-CCNC: 78 U/L (ref 34–104)
ALT SERPL W P-5'-P-CCNC: 10 U/L (ref 7–52)
ANION GAP SERPL CALCULATED.3IONS-SCNC: 7 MMOL/L (ref 4–13)
AST SERPL W P-5'-P-CCNC: 20 U/L (ref 13–39)
BACTERIA UR QL AUTO: NORMAL /HPF
BASOPHILS # BLD AUTO: 0.08 THOUSANDS/ÂΜL (ref 0–0.1)
BASOPHILS NFR BLD AUTO: 1 % (ref 0–1)
BILIRUB SERPL-MCNC: 0.49 MG/DL (ref 0.2–1)
BILIRUB UR QL STRIP: NEGATIVE
BUN SERPL-MCNC: 13 MG/DL (ref 5–25)
CALCIUM SERPL-MCNC: 9.2 MG/DL (ref 8.4–10.2)
CARDIAC TROPONIN I PNL SERPL HS: <2 NG/L (ref ?–50)
CHLORIDE SERPL-SCNC: 109 MMOL/L (ref 96–108)
CLARITY UR: CLEAR
CO2 SERPL-SCNC: 24 MMOL/L (ref 21–32)
COLOR UR: YELLOW
CREAT SERPL-MCNC: 0.68 MG/DL (ref 0.6–1.3)
EOSINOPHIL # BLD AUTO: 0.23 THOUSAND/ÂΜL (ref 0–0.61)
EOSINOPHIL NFR BLD AUTO: 3 % (ref 0–6)
ERYTHROCYTE [DISTWIDTH] IN BLOOD BY AUTOMATED COUNT: 12.7 % (ref 11.6–15.1)
GFR SERPL CREATININE-BSD FRML MDRD: 98 ML/MIN/1.73SQ M
GLUCOSE SERPL-MCNC: 96 MG/DL (ref 65–140)
GLUCOSE UR STRIP-MCNC: NEGATIVE MG/DL
HCT VFR BLD AUTO: 46.1 % (ref 34.8–46.1)
HGB BLD-MCNC: 15.2 G/DL (ref 11.5–15.4)
HGB UR QL STRIP.AUTO: ABNORMAL
IMM GRANULOCYTES # BLD AUTO: 0.02 THOUSAND/UL (ref 0–0.2)
IMM GRANULOCYTES NFR BLD AUTO: 0 % (ref 0–2)
KETONES UR STRIP-MCNC: NEGATIVE MG/DL
LEUKOCYTE ESTERASE UR QL STRIP: NEGATIVE
LIPASE SERPL-CCNC: 39 U/L (ref 11–82)
LYMPHOCYTES # BLD AUTO: 1.87 THOUSANDS/ÂΜL (ref 0.6–4.47)
LYMPHOCYTES NFR BLD AUTO: 22 % (ref 14–44)
MCH RBC QN AUTO: 30.5 PG (ref 26.8–34.3)
MCHC RBC AUTO-ENTMCNC: 33 G/DL (ref 31.4–37.4)
MCV RBC AUTO: 92 FL (ref 82–98)
MONOCYTES # BLD AUTO: 0.46 THOUSAND/ÂΜL (ref 0.17–1.22)
MONOCYTES NFR BLD AUTO: 5 % (ref 4–12)
NEUTROPHILS # BLD AUTO: 5.81 THOUSANDS/ÂΜL (ref 1.85–7.62)
NEUTS SEG NFR BLD AUTO: 69 % (ref 43–75)
NITRITE UR QL STRIP: NEGATIVE
NON-SQ EPI CELLS URNS QL MICRO: NORMAL /HPF
NRBC BLD AUTO-RTO: 0 /100 WBCS
PH UR STRIP.AUTO: 6 [PH]
PLATELET # BLD AUTO: 217 THOUSANDS/UL (ref 149–390)
PMV BLD AUTO: 10.4 FL (ref 8.9–12.7)
POTASSIUM SERPL-SCNC: 4.4 MMOL/L (ref 3.5–5.3)
PROT SERPL-MCNC: 6.9 G/DL (ref 6.4–8.4)
PROT UR STRIP-MCNC: NEGATIVE MG/DL
RBC # BLD AUTO: 4.99 MILLION/UL (ref 3.81–5.12)
RBC #/AREA URNS AUTO: NORMAL /HPF
SODIUM SERPL-SCNC: 140 MMOL/L (ref 135–147)
SP GR UR STRIP.AUTO: 1.02 (ref 1–1.03)
UROBILINOGEN UR QL STRIP.AUTO: 0.2 E.U./DL
WBC # BLD AUTO: 8.47 THOUSAND/UL (ref 4.31–10.16)
WBC #/AREA URNS AUTO: NORMAL /HPF

## 2025-07-09 PROCEDURE — 83690 ASSAY OF LIPASE: CPT

## 2025-07-09 PROCEDURE — 96361 HYDRATE IV INFUSION ADD-ON: CPT

## 2025-07-09 PROCEDURE — 96374 THER/PROPH/DIAG INJ IV PUSH: CPT

## 2025-07-09 PROCEDURE — 81001 URINALYSIS AUTO W/SCOPE: CPT

## 2025-07-09 PROCEDURE — 99284 EMERGENCY DEPT VISIT MOD MDM: CPT

## 2025-07-09 PROCEDURE — 74176 CT ABD & PELVIS W/O CONTRAST: CPT

## 2025-07-09 PROCEDURE — 96375 TX/PRO/DX INJ NEW DRUG ADDON: CPT

## 2025-07-09 PROCEDURE — 85025 COMPLETE CBC W/AUTO DIFF WBC: CPT

## 2025-07-09 PROCEDURE — 84484 ASSAY OF TROPONIN QUANT: CPT

## 2025-07-09 PROCEDURE — 36415 COLL VENOUS BLD VENIPUNCTURE: CPT

## 2025-07-09 PROCEDURE — 80053 COMPREHEN METABOLIC PANEL: CPT

## 2025-07-09 PROCEDURE — 99285 EMERGENCY DEPT VISIT HI MDM: CPT | Performed by: EMERGENCY MEDICINE

## 2025-07-09 PROCEDURE — 93005 ELECTROCARDIOGRAM TRACING: CPT

## 2025-07-09 RX ORDER — ALBUTEROL SULFATE 0.83 MG/ML
2.5 SOLUTION RESPIRATORY (INHALATION) ONCE
Status: COMPLETED | OUTPATIENT
Start: 2025-07-09 | End: 2025-07-09

## 2025-07-09 RX ORDER — FENTANYL CITRATE 50 UG/ML
INJECTION, SOLUTION INTRAMUSCULAR; INTRAVENOUS AS NEEDED
Status: SHIPPED | OUTPATIENT
Start: 2025-07-09

## 2025-07-09 RX ORDER — MIDAZOLAM HYDROCHLORIDE 2 MG/2ML
INJECTION, SOLUTION INTRAMUSCULAR; INTRAVENOUS AS NEEDED
Status: SHIPPED | OUTPATIENT
Start: 2025-07-09

## 2025-07-09 RX ORDER — CEFAZOLIN SODIUM 2 G/50ML
2000 SOLUTION INTRAVENOUS ONCE
Status: DISCONTINUED | OUTPATIENT
Start: 2025-07-09 | End: 2025-07-09 | Stop reason: HOSPADM

## 2025-07-09 RX ORDER — MORPHINE SULFATE 4 MG/ML
4 INJECTION, SOLUTION INTRAMUSCULAR; INTRAVENOUS ONCE
Status: COMPLETED | OUTPATIENT
Start: 2025-07-09 | End: 2025-07-09

## 2025-07-09 RX ORDER — ACETAMINOPHEN 10 MG/ML
1000 INJECTION, SOLUTION INTRAVENOUS ONCE
Status: DISCONTINUED | OUTPATIENT
Start: 2025-07-09 | End: 2025-07-09 | Stop reason: HOSPADM

## 2025-07-09 RX ORDER — ONDANSETRON 2 MG/ML
4 INJECTION INTRAMUSCULAR; INTRAVENOUS ONCE
Status: COMPLETED | OUTPATIENT
Start: 2025-07-09 | End: 2025-07-09

## 2025-07-09 RX ADMIN — ALBUTEROL SULFATE 2.5 MG: 2.5 SOLUTION RESPIRATORY (INHALATION) at 09:38

## 2025-07-09 RX ADMIN — SODIUM CHLORIDE 1000 ML: 0.9 INJECTION, SOLUTION INTRAVENOUS at 12:18

## 2025-07-09 RX ADMIN — MORPHINE SULFATE 4 MG: 4 INJECTION INTRAVENOUS at 12:18

## 2025-07-09 RX ADMIN — ONDANSETRON 4 MG: 2 INJECTION INTRAMUSCULAR; INTRAVENOUS at 12:17

## 2025-07-09 NOTE — QUICK NOTE
Patient is complaining of significant left flank pain radiating around her abdomen into her left groin.  She does have a history of kidney stones.  She does complain of some frequency and urgency currently.  She denies any hematuria.  She is significantly hypertensive as well.  On exam, her abdomen is soft and nontender.  Her umbilical hernia is easily reducible and nontender.  She does have a positive Guru sign on the left.  For patient's safety, surgery will be canceled for today.  The patient has been offered to go to the ER for a kidney stone workup.  I advised the patient that when she has full resolution of her discomfort and her blood pressure is more stable, to call the office to reschedule the surgery.  She understands and all of her questions and concerns were addressed.

## 2025-07-09 NOTE — NURSING NOTE
Dr. Gee and Dr. Elizabeth notified of patient BP and patient complaint of flank/back pain.  Patient reports that she has chronic back pain, as well as worsening flank pain.

## 2025-07-09 NOTE — ED PROVIDER NOTES
Time reflects when diagnosis was documented in both MDM as applicable and the Disposition within this note       Time User Action Codes Description Comment    7/9/2025  3:14 PM Aliya Alfred Add [R10.9] Abdominal pain           ED Disposition       ED Disposition   Discharge    Condition   Stable    Date/Time   Wed Jul 9, 2025  3:14 PM    Comment   Ayde Gotti discharge to home/self care.                   Assessment & Plan       Medical Decision Making  This is a 55-year-old female presenting to the ED for evaluation for elevated blood pressure as well as abdominal pain.  Patient at risk for appendicitis, renal colic, kidney stone, ischemic bowel    Amount and/or Complexity of Data Reviewed  Radiology: ordered.    Risk  Prescription drug management.        ED Course as of 07/09/25 1515   Wed Jul 09, 2025   1513 Patient had a stable ED course without any acute findings.  She will be discharged home for outpatient follow-up.       Medications   morphine injection 4 mg (4 mg Intravenous Given 7/9/25 1218)   ondansetron (ZOFRAN) injection 4 mg (4 mg Intravenous Given 7/9/25 1217)   sodium chloride 0.9 % bolus 1,000 mL (0 mL Intravenous Stopped 7/9/25 1318)       ED Risk Strat Scores                    No data recorded        SBIRT 22yo+      Flowsheet Row Most Recent Value   Initial Alcohol Screen: US AUDIT-C     1. How often do you have a drink containing alcohol? 0 Filed at: 07/09/2025 1039   2. How many drinks containing alcohol do you have on a typical day you are drinking?  0 Filed at: 07/09/2025 1039   3b. FEMALE Any Age, or MALE 65+: How often do you have 4 or more drinks on one occassion? 0 Filed at: 07/09/2025 1039   Audit-C Score 0 Filed at: 07/09/2025 1039   PUSHPA: How many times in the past year have you...    Used an illegal drug or used a prescription medication for non-medical reasons? Never Filed at: 07/09/2025 1039                            History of Present Illness       Chief Complaint    Patient presents with    Hypertension     Patient scheduled for hernia repair surgery, hypertensive with abdominal pain on arrival.       Past Medical History[1]   Past Surgical History[2]   Family History[3]   Social History[4]   E-Cigarette/Vaping    E-Cigarette Use Current Some Day User       E-Cigarette/Vaping Substances    Nicotine Yes     THC No     CBD No     Flavoring No     Other No     Unknown No       I have reviewed and agree with the history as documented.     This is a 55-year-old female presenting to the ED for evaluation of left flank pain.  Patient was in preop for surgery for hernia repair when she became hypertensive and had severe left lower quadrant abdominal pain radiating to the flank.  Patient denies any urinary symptoms or fever or chills.  She states her pain is 12 out of 10 sharp and intermittent        Review of Systems   Constitutional:  Negative for chills and fever.   HENT:  Negative for ear pain and sore throat.    Eyes:  Negative for pain and visual disturbance.   Respiratory:  Negative for cough and shortness of breath.    Cardiovascular:  Negative for chest pain and palpitations.   Gastrointestinal:  Positive for abdominal pain. Negative for vomiting.   Genitourinary:  Positive for flank pain. Negative for dysuria and hematuria.   Musculoskeletal:  Negative for arthralgias and back pain.   Skin:  Negative for color change and rash.   Neurological:  Negative for seizures and syncope.   All other systems reviewed and are negative.          Objective       ED Triage Vitals   Temperature Pulse Blood Pressure Respirations SpO2 Patient Position - Orthostatic VS   07/09/25 1025 07/09/25 1025 07/09/25 1025 07/09/25 1025 07/09/25 1025 07/09/25 1025   (!) 97.3 °F (36.3 °C) 71 (!) 178/96 18 97 % Sitting      Temp Source Heart Rate Source BP Location FiO2 (%) Pain Score    07/09/25 1025 07/09/25 1025 07/09/25 1100 -- 07/09/25 1025    Temporal Monitor Right arm  10 - Worst Possible Pain       Vitals      Date and Time Temp Pulse SpO2 Resp BP Pain Score FACES Pain Rating User   07/09/25 1430 -- 57 94 % 18 141/80 -- --    07/09/25 1400 -- 59 95 % 18 143/81 -- --    07/09/25 1330 -- 63 95 % 18 143/77 -- --    07/09/25 1300 -- 60 95 % 18 153/83 -- --    07/09/25 1230 -- 69 96 % 18 165/82 -- --    07/09/25 1218 -- -- -- -- -- 10 - Worst Possible Pain --    07/09/25 1215 -- 63 97 % 18 186/91 10 - Worst Possible Pain --    07/09/25 1130 -- 69 96 % 18 148/76 -- --    07/09/25 1100 -- 64 96 % 18 156/91 -- --    07/09/25 1025 97.3 °F (36.3 °C) 71 97 % 18 178/96 10 - Worst Possible Pain -- AS            Physical Exam  Vitals and nursing note reviewed.   Constitutional:       General: She is not in acute distress.     Appearance: Normal appearance. She is well-developed and normal weight.   HENT:      Head: Normocephalic and atraumatic.     Eyes:      Conjunctiva/sclera: Conjunctivae normal.       Cardiovascular:      Rate and Rhythm: Normal rate and regular rhythm.      Heart sounds: No murmur heard.  Pulmonary:      Effort: Pulmonary effort is normal. No respiratory distress.      Breath sounds: Normal breath sounds.   Abdominal:      General: Abdomen is flat. There is no distension.      Palpations: Abdomen is soft. There is no mass.      Tenderness: There is no abdominal tenderness. There is no right CVA tenderness, left CVA tenderness, guarding or rebound.     Musculoskeletal:         General: No swelling. Normal range of motion.      Cervical back: Normal range of motion and neck supple.     Skin:     General: Skin is warm and dry.      Capillary Refill: Capillary refill takes less than 2 seconds.     Neurological:      General: No focal deficit present.      Mental Status: She is alert and oriented to person, place, and time. Mental status is at baseline.     Psychiatric:         Mood and Affect: Mood normal.         Results Reviewed       Procedure Component Value Units Date/Time    HS  Troponin 0hr (reflex protocol) [[id number]]  (Normal) Collected: 07/09/25 1044    Lab Status: Final result Specimen: Blood from Hand, Left Updated: 07/09/25 1114     hs TnI 0hr <2 ng/L     Comprehensive metabolic panel [[id number]]  (Abnormal) Collected: 07/09/25 1044    Lab Status: Final result Specimen: Blood from Hand, Left Updated: 07/09/25 1108     Sodium 140 mmol/L      Potassium 4.4 mmol/L      Chloride 109 mmol/L      CO2 24 mmol/L      ANION GAP 7 mmol/L      BUN 13 mg/dL      Creatinine 0.68 mg/dL      Glucose 96 mg/dL      Calcium 9.2 mg/dL      AST 20 U/L      ALT 10 U/L      Alkaline Phosphatase 78 U/L      Total Protein 6.9 g/dL      Albumin 4.2 g/dL      Total Bilirubin 0.49 mg/dL      eGFR 98 ml/min/1.73sq m     Narrative:      National Kid[employer] Foundation guidelines for Chronic Kidney Disease (CKD):     Stage 1 with normal or high GFR (GFR > 90 mL/min/1.73 square meters)    Stage 2 Mild CKD (GFR = 60-89 mL/min/1.73 square meters)    Stage 3A Moderate CKD (GFR = 45-59 mL/min/1.73 square meters)    Stage 3B Moderate CKD (GFR = 30-44 mL/min/1.73 square meters)    Stage 4 Severe CKD (GFR = 15-29 mL/min/1.73 square meters)    Stage 5 End Stage CKD (GFR <15 mL/min/1.73 square meters)  Note: GFR calculation is accurate only with a steady state creatinine    Lipase [[id number]]  (Normal) Collected: 07/09/25 1044    Lab Status: Final result Specimen: Blood from Hand, Left Updated: 07/09/25 1108     Lipase 39 u/L     Urine Microscopic [[id number]]  (Normal) Collected: 07/09/25 1052    Lab Status: Final result Specimen: Urine, Clean Catch Updated: 07/09/25 1105     RBC, UA 0-1 /hpf      WBC, UA None Seen /hpf      Epithelial Cells None Seen /hpf      Bacteria, UA None Seen /hpf     UA w Reflex to Microscopic w Reflex to Culture [[id number]]  (Abnormal) Collected: 07/09/25 1052    Lab Status: Final result Specimen: Urine, Clean Catch Updated: 07/09/25 1059     Color, UA Yellow     Clarity, UA Clear      Specific Gravity, UA 1.025     pH, UA 6.0     Leukocytes, UA Negative     Nitrite, UA Negative     Protein, UA Negative mg/dl      Glucose, UA Negative mg/dl      Ketones, UA Negative mg/dl      Urobilinogen, UA 0.2 E.U./dl      Bilirubin, UA Negative     Occult Blood, UA Trace-Intact    CBC and differential [[id number]] Collected: 07/09/25 1044    Lab Status: Final result Specimen: Blood from Hand, Left Updated: 07/09/25 1049     WBC 8.47 Thousand/uL      RBC 4.99 Million/uL      Hemoglobin 15.2 g/dL      Hematocrit 46.1 %      MCV 92 fL      MCH 30.5 pg      MCHC 33.0 g/dL      RDW 12.7 %      MPV 10.4 fL      Platelets 217 Thousands/uL      nRBC 0 /100 WBCs      Segmented % 69 %      Immature Grans % 0 %      Lymphocytes % 22 %      Monocytes % 5 %      Eosinophils Relative 3 %      Basophils Relative 1 %      Absolute Neutrophils 5.81 Thousands/µL      Absolute Immature Grans 0.02 Thousand/uL      Absolute Lymphocytes 1.87 Thousands/µL      Absolute Monocytes 0.46 Thousand/µL      Eosinophils Absolute 0.23 Thousand/µL      Basophils Absolute 0.08 Thousands/µL             CT renal stone study abdomen pelvis wo contrast   Final Interpretation by Vick Pete MD (07/09 1510)      No urolithiasis, hydronephrosis, or other acute abdominopelvic findings.            Resident: Alejo Spencer I, the attending radiologist, have reviewed the images and agree with the final report above.      Workstation performed: [id number]             Procedures    ED Medication and Procedure Management   Prior to Admission Medications   Prescriptions Last Dose Informant Patient Reported? Taking?   Ascorbic Acid, Vitamin C, (VITAMIN C) 100 MG tablet   Yes No   Sig: Take by mouth   DULoxetine (CYMBALTA) 30 mg delayed release capsule   No No   Sig: Take 1 capsule (30 mg total) by mouth daily   Patient taking differently: Take 30 mg by mouth every morning   MULTIPLE VITAMINS PO   Yes No   Sig: Take by mouth   NON FORMULARY   Yes No    Sig: daily at bedtime as needed Medical Marijuana   Niacinamide (VITAMIN B3 PO)   Yes No   Sig: Take by mouth   diazepam (VALIUM) 5 mg tablet   No No   Sig: Take 1 tablet (5 mg total) by mouth 2 (two) times a day for 10 days   Patient taking differently: Take 5 mg by mouth every 12 (twelve) hours as needed   ergocalciferol (VITAMIN D2) 50,000 units   No No   Sig: take 1 capsule by mouth every week   ibuprofen (MOTRIN) 200 mg tablet   Yes No   Sig: Take by mouth every 6 (six) hours as needed for mild pain   ibuprofen (MOTRIN) 800 mg tablet   No No   Sig: Take 1 tablet (800 mg total) by mouth 3 (three) times a day   Patient taking differently: Take 800 mg by mouth every 8 (eight) hours as needed   levothyroxine 25 mcg tablet   Yes No   Sig: Take 1 tablet by mouth daily before breakfast   losartan (COZAAR) 100 MG tablet   Yes No   Sig: Take 100 mg by mouth in the morning.   omeprazole (PriLOSEC) 20 mg delayed release capsule   No No   Sig: Take 1 capsule (20 mg total) by mouth daily   Patient taking differently: Take 20 mg by mouth every evening   oxyCODONE-acetaminophen (Percocet) 5-325 mg per tablet   No No   Sig: Take 1 tablet by mouth every 4 (four) hours as needed for moderate pain Max Daily Amount: 6 tablets      Facility-Administered Medications: None     Patient's Medications   Discharge Prescriptions    No medications on file     No discharge procedures on file.  ED SEPSIS DOCUMENTATION   Time reflects when diagnosis was documented in both MDM as applicable and the Disposition within this note       Time User Action Codes Description Comment    7/9/2025  3:14 PM Aliya Alfred Add [R10.9] Abdominal pain                    [1]   Past Medical History:  Diagnosis Date    Chronic bronchitis (HCC)     Chronic narcotic dependence (HCC)     Chronic pain disorder     low back    Disease of thyroid gland     hypothyoid    Dizziness     Fibromyalgia     GERD (gastroesophageal reflux disease)     Hypertension      Irritable bowel syndrome     Kidney stone     Lumbar disc disorder     Lupus 2017    questionable    Migraines     Thoracic outlet syndrome     Tobacco abuse     Wears dentures     full uppers   [2]   Past Surgical History:  Procedure Laterality Date    APPENDECTOMY      COLONOSCOPY      ENDOMETRIAL ABLATION      FL RETROGRADE PYELOGRAM  11/27/2022    FL RETROGRADE PYELOGRAM  02/14/2023    KNEE ARTHROSCOPY Right     PA CYSTO/URETERO W/LITHOTRIPSY &INDWELL STENT INSRT Right 02/14/2023    Procedure: CYSTO USCOPE W/  LASER, & STENT EXCHANGE;  Surgeon: Nikolas Renteria MD;  Location: [facility];  Service: Urology    PA CYSTOURETHROSCOPY W/URETERAL CATHETERIZATION Right 11/26/2022    Procedure: CYSTOSCOPY RETROGRADE PYELOGRAM WITH INSERTION STENT URETERAL;  Surgeon: Dipti Novak MD;  Location: [facility];  Service: Urology    THORACIC SYMPATHETECTOMY     [3]   Family History  Problem Relation Name Age of Onset    Osteoporosis Mother      Cancer Father Hemal     No Known Problems Sister      No Known Problems Daughter      Breast cancer Maternal Grandmother      No Known Problems Maternal Grandfather      Breast cancer Paternal Grandmother      No Known Problems Paternal Grandfather      No Known Problems Paternal Aunt     [4]   Social History  Tobacco Use    Smoking status: Some Days     Current packs/day: 0.25     Average packs/day: 0.6 packs/day for 40.0 years (25.0 ttl pk-yrs)     Types: Cigarettes     Passive exposure: Current    Smokeless tobacco: Never    Tobacco comments:     Smokes occ cigarette    Vaping Use    Vaping status: Some Days    Substances: Nicotine   Substance Use Topics    Alcohol use: Not Currently     Comment: Social    Drug use: Yes     Frequency: 2.0 times per week     Types: Marijuana     Comment: Marijuana tincture- has medical marijuana card        Aliya Alfred DO  07/09/25 3004

## 2025-07-10 LAB
ATRIAL RATE: 62 BPM
P AXIS: 63 DEGREES
PR INTERVAL: 160 MS
QRS AXIS: 37 DEGREES
QRSD INTERVAL: 70 MS
QT INTERVAL: 410 MS
QTC INTERVAL: 416 MS
T WAVE AXIS: 42 DEGREES
VENTRICULAR RATE: 62 BPM

## 2025-07-15 DIAGNOSIS — G89.29 OTHER CHRONIC PAIN: ICD-10-CM

## 2025-07-16 RX ORDER — OXYCODONE AND ACETAMINOPHEN 5; 325 MG/1; MG/1
1 TABLET ORAL EVERY 4 HOURS PRN
Qty: 30 TABLET | Refills: 0 | Status: SHIPPED | OUTPATIENT
Start: 2025-07-16 | End: 2025-07-30 | Stop reason: SDUPTHER

## 2025-07-18 ENCOUNTER — RA CDI HCC (OUTPATIENT)
Dept: OTHER | Facility: HOSPITAL | Age: 55
End: 2025-07-18

## 2025-07-18 PROBLEM — Z00.00 ANNUAL PHYSICAL EXAM: Status: RESOLVED | Noted: 2024-08-27 | Resolved: 2025-07-18

## 2025-07-22 ENCOUNTER — OFFICE VISIT (OUTPATIENT)
Dept: FAMILY MEDICINE CLINIC | Facility: CLINIC | Age: 55
End: 2025-07-22
Payer: COMMERCIAL

## 2025-07-22 VITALS
HEART RATE: 68 BPM | HEIGHT: 61 IN | BODY MASS INDEX: 28.96 KG/M2 | WEIGHT: 153.4 LBS | DIASTOLIC BLOOD PRESSURE: 86 MMHG | SYSTOLIC BLOOD PRESSURE: 148 MMHG | OXYGEN SATURATION: 96 %

## 2025-07-22 DIAGNOSIS — E06.3 HYPOTHYROIDISM DUE TO HASHIMOTO THYROIDITIS: ICD-10-CM

## 2025-07-22 DIAGNOSIS — Z12.12 SCREENING FOR COLORECTAL CANCER: ICD-10-CM

## 2025-07-22 DIAGNOSIS — K21.9 GASTROESOPHAGEAL REFLUX DISEASE WITHOUT ESOPHAGITIS: ICD-10-CM

## 2025-07-22 DIAGNOSIS — I10 PRIMARY HYPERTENSION: Primary | ICD-10-CM

## 2025-07-22 DIAGNOSIS — Z12.11 SCREENING FOR COLORECTAL CANCER: ICD-10-CM

## 2025-07-22 DIAGNOSIS — G89.4 CHRONIC PAIN SYNDROME: ICD-10-CM

## 2025-07-22 DIAGNOSIS — M54.32 SCIATICA, LEFT SIDE: ICD-10-CM

## 2025-07-22 DIAGNOSIS — M79.7 FIBROMYALGIA: ICD-10-CM

## 2025-07-22 DIAGNOSIS — Z12.4 CERVICAL CANCER SCREENING: ICD-10-CM

## 2025-07-22 PROCEDURE — 99214 OFFICE O/P EST MOD 30 MIN: CPT | Performed by: FAMILY MEDICINE

## 2025-07-22 RX ORDER — OMEPRAZOLE 20 MG/1
20 CAPSULE, DELAYED RELEASE ORAL DAILY
Qty: 90 CAPSULE | Refills: 3 | Status: SHIPPED | OUTPATIENT
Start: 2025-07-22

## 2025-07-22 RX ORDER — LOSARTAN POTASSIUM 100 MG/1
100 TABLET ORAL DAILY
Qty: 90 TABLET | Refills: 3 | Status: SHIPPED | OUTPATIENT
Start: 2025-07-22

## 2025-07-22 RX ORDER — LEVOTHYROXINE SODIUM 25 UG/1
25 TABLET ORAL
Qty: 90 TABLET | Refills: 3 | Status: SHIPPED | OUTPATIENT
Start: 2025-07-22

## 2025-07-22 RX ORDER — DULOXETIN HYDROCHLORIDE 30 MG/1
30 CAPSULE, DELAYED RELEASE ORAL DAILY
Qty: 90 CAPSULE | Refills: 3 | Status: SHIPPED | OUTPATIENT
Start: 2025-07-22

## 2025-07-22 NOTE — PROGRESS NOTES
Name: Ayde Gotti      : 1970      MRN: [id number]  Encounter Provider: Robert Budinetz, MD  Encounter Date: 2025   Encounter department: [facility]  :  Assessment & Plan  Primary hypertension    Orders:    losartan (COZAAR) 100 MG tablet; Take 1 tablet (100 mg total) by mouth in the morning.    Hypothyroidism due to Hashimoto thyroiditis    Orders:    levothyroxine 25 mcg tablet; Take 1 tablet (25 mcg total) by mouth daily before breakfast    Fibromyalgia    Orders:    DULoxetine (CYMBALTA) 30 mg delayed release capsule; Take 1 capsule (30 mg total) by mouth daily    Chronic pain syndrome    Orders:    [facility] All Prescribed Meds and Special Instructions    Amphetamines, Methamphetamines    Butalbital    Phenobarbital    Secobarbital    Alprazolam    Clonazepam    Diazepam, Temazepam, Oxazepam    Lorazepam    Gabapentin    Pregabalin    Cocaine    Heroin    Buprenorphine    Levorphanol    Meperidine    Naltrexone    Fentanyl    Methadone    Oxycodone    Tapentadol    THC    Tramadol    Codeine, Hydrocodone, Hydropmorphone, Morphine    Bath Salts    Ethyl Glucuronide/Ethyl Sulfate    Kratom    Spice    Methylphenidate    Phentermine    Validity Oxidant    Validity Creatinine    Validity pH    Validity Specific    Xylazine Definitive Test    Gastroesophageal reflux disease without esophagitis    Orders:    omeprazole (PriLOSEC) 20 mg delayed release capsule; Take 1 capsule (20 mg total) by mouth daily    Cervical cancer screening    Orders:    Ambulatory Referral to Obstetrics / Gynecology; Future    Screening for colorectal cancer    Orders:    Ambulatory Referral to Gastroenterology; Future           History of Present Illness   Ayde is here to follow-up on hypertension, hypothyroidism, chronic pain syndrome, and fibromyalgia.  She needs a referral for colon screening as well as gynecology.  Pain is stable on her current meds.  She is on Synthroid for hypothyroidism  "losartan for hypertension and Percocet for the chronic pain.  I have reviewed the PDMP and she had an updated urine drug screen and narcotic agreement today.  No active chest pain or shortness of breath. She does not smoke daily anymore.      Review of Systems   Respiratory: Negative.     Cardiovascular: Negative.        Objective   /86 (BP Location: Left arm, Patient Position: Sitting, Cuff Size: Standard)   Pulse 68   Ht 5' 1\" (1.549 m)   Wt 69.6 kg (153 lb 6.4 oz)   SpO2 96%   BMI 28.98 kg/m²      Physical Exam  Vitals and nursing note reviewed.   Constitutional:       General: She is not in acute distress.     Appearance: She is well-developed.   HENT:      Head: Normocephalic and atraumatic.     Eyes:      Conjunctiva/sclera: Conjunctivae normal.       Cardiovascular:      Rate and Rhythm: Normal rate and regular rhythm.      Heart sounds: No murmur heard.  Pulmonary:      Effort: Pulmonary effort is normal. No respiratory distress.      Breath sounds: Normal breath sounds.   Abdominal:      Palpations: Abdomen is soft.      Tenderness: There is no abdominal tenderness.     Musculoskeletal:         General: No swelling.      Cervical back: Neck supple.     Skin:     General: Skin is warm and dry.      Capillary Refill: Capillary refill takes less than 2 seconds.     Neurological:      Mental Status: She is alert.     Psychiatric:         Mood and Affect: Mood normal.         "

## 2025-07-22 NOTE — ASSESSMENT & PLAN NOTE
Orders:    DULoxetine (CYMBALTA) 30 mg delayed release capsule; Take 1 capsule (30 mg total) by mouth daily

## 2025-07-22 NOTE — ASSESSMENT & PLAN NOTE
Orders:    levothyroxine 25 mcg tablet; Take 1 tablet (25 mcg total) by mouth daily before breakfast

## 2025-07-22 NOTE — ASSESSMENT & PLAN NOTE
Orders:    losartan (COZAAR) 100 MG tablet; Take 1 tablet (100 mg total) by mouth in the morning.

## 2025-07-24 LAB

## 2025-07-30 ENCOUNTER — TELEPHONE (OUTPATIENT)
Dept: FAMILY MEDICINE CLINIC | Facility: CLINIC | Age: 55
End: 2025-07-30

## 2025-07-30 DIAGNOSIS — G89.29 OTHER CHRONIC PAIN: ICD-10-CM

## 2025-07-30 RX ORDER — OXYCODONE AND ACETAMINOPHEN 5; 325 MG/1; MG/1
1 TABLET ORAL EVERY 4 HOURS PRN
Qty: 30 TABLET | Refills: 0 | Status: SHIPPED | OUTPATIENT
Start: 2025-07-30 | End: 2025-08-04 | Stop reason: SDUPTHER

## 2025-08-04 DIAGNOSIS — G89.29 OTHER CHRONIC PAIN: ICD-10-CM

## 2025-08-04 RX ORDER — OXYCODONE AND ACETAMINOPHEN 5; 325 MG/1; MG/1
1 TABLET ORAL EVERY 4 HOURS PRN
Qty: 30 TABLET | Refills: 0 | Status: SHIPPED | OUTPATIENT
Start: 2025-08-04

## 2025-08-19 DIAGNOSIS — M54.16 LUMBAR RADICULOPATHY: Primary | ICD-10-CM

## 2025-08-19 DIAGNOSIS — G89.29 OTHER CHRONIC PAIN: ICD-10-CM

## 2025-08-19 RX ORDER — OXYCODONE AND ACETAMINOPHEN 5; 325 MG/1; MG/1
1 TABLET ORAL EVERY 4 HOURS PRN
Qty: 30 TABLET | Refills: 0 | Status: SHIPPED | OUTPATIENT
Start: 2025-08-19

## 2025-08-20 DIAGNOSIS — M54.16 LUMBAR RADICULOPATHY: Primary | ICD-10-CM

## (undated) DEVICE — GUIDEWIRE STRGHT TIP 0.035 IN  SOLO PLUS

## (undated) DEVICE — SPECIMEN CONTAINER STERILE PEEL PACK

## (undated) DEVICE — PREMIUM DRY TRAY LF: Brand: MEDLINE INDUSTRIES, INC.

## (undated) DEVICE — INVIEW CLEAR LEGGINGS: Brand: CONVERTORS

## (undated) DEVICE — GUIDEWIRE .038 X 145

## (undated) DEVICE — SINGLE PORT MANIFOLD: Brand: NEPTUNE 2

## (undated) DEVICE — CHLORHEXIDINE 4PCT 4 OZ

## (undated) DEVICE — UROCATCH BAG

## (undated) DEVICE — 3M™ STERI-STRIP™ COMPOUND BENZOIN TINCTURE 40 BAGS/CARTON 4 CARTONS/CASE C1544: Brand: 3M™ STERI-STRIP™

## (undated) DEVICE — SCD SEQUENTIAL COMPRESSION COMFORT SLEEVE MEDIUM KNEE LENGTH: Brand: KENDALL SCD

## (undated) DEVICE — PACK TUR

## (undated) DEVICE — TUBING SUCTION 5MM X 12 FT

## (undated) DEVICE — FIBER STD QUANTA 272 MICRON

## (undated) DEVICE — GLOVE SRG BIOGEL 7.5

## (undated) DEVICE — STERILE SURGICAL LUBRICANT,  TUBE: Brand: SURGILUBE

## (undated) DEVICE — EXIDINE 4 PCT

## (undated) DEVICE — SYRINGE 10ML LL

## (undated) DEVICE — GLOVE INDICATOR PI UNDERGLOVE SZ 7 BLUE